# Patient Record
Sex: FEMALE | Race: BLACK OR AFRICAN AMERICAN | NOT HISPANIC OR LATINO | Employment: FULL TIME | URBAN - METROPOLITAN AREA
[De-identification: names, ages, dates, MRNs, and addresses within clinical notes are randomized per-mention and may not be internally consistent; named-entity substitution may affect disease eponyms.]

---

## 2021-07-01 ENCOUNTER — PATIENT MESSAGE (OUTPATIENT)
Dept: ADMINISTRATIVE | Facility: OTHER | Age: 46
End: 2021-07-01

## 2022-06-01 ENCOUNTER — LAB VISIT (OUTPATIENT)
Dept: LAB | Facility: HOSPITAL | Age: 47
End: 2022-06-01
Attending: OBSTETRICS & GYNECOLOGY
Payer: COMMERCIAL

## 2022-06-01 ENCOUNTER — OFFICE VISIT (OUTPATIENT)
Dept: OBSTETRICS AND GYNECOLOGY | Facility: CLINIC | Age: 47
End: 2022-06-01
Payer: COMMERCIAL

## 2022-06-01 VITALS
BODY MASS INDEX: 36.74 KG/M2 | DIASTOLIC BLOOD PRESSURE: 78 MMHG | SYSTOLIC BLOOD PRESSURE: 118 MMHG | WEIGHT: 215.19 LBS | HEIGHT: 64 IN

## 2022-06-01 DIAGNOSIS — Z12.4 PAP SMEAR FOR CERVICAL CANCER SCREENING: ICD-10-CM

## 2022-06-01 DIAGNOSIS — N93.8 DUB (DYSFUNCTIONAL UTERINE BLEEDING): Primary | ICD-10-CM

## 2022-06-01 DIAGNOSIS — Z12.31 ENCOUNTER FOR SCREENING MAMMOGRAM FOR MALIGNANT NEOPLASM OF BREAST: ICD-10-CM

## 2022-06-01 DIAGNOSIS — N93.8 DUB (DYSFUNCTIONAL UTERINE BLEEDING): ICD-10-CM

## 2022-06-01 LAB
BASOPHILS # BLD AUTO: 0.05 K/UL (ref 0–0.2)
BASOPHILS NFR BLD: 0.7 % (ref 0–1.9)
DIFFERENTIAL METHOD: ABNORMAL
EOSINOPHIL # BLD AUTO: 0.2 K/UL (ref 0–0.5)
EOSINOPHIL NFR BLD: 2.3 % (ref 0–8)
ERYTHROCYTE [DISTWIDTH] IN BLOOD BY AUTOMATED COUNT: 14.8 % (ref 11.5–14.5)
HCT VFR BLD AUTO: 35.4 % (ref 37–48.5)
HGB BLD-MCNC: 10.6 G/DL (ref 12–16)
IMM GRANULOCYTES # BLD AUTO: 0.01 K/UL (ref 0–0.04)
IMM GRANULOCYTES NFR BLD AUTO: 0.1 % (ref 0–0.5)
LYMPHOCYTES # BLD AUTO: 2.2 K/UL (ref 1–4.8)
LYMPHOCYTES NFR BLD: 31.6 % (ref 18–48)
MCH RBC QN AUTO: 21.5 PG (ref 27–31)
MCHC RBC AUTO-ENTMCNC: 29.9 G/DL (ref 32–36)
MCV RBC AUTO: 72 FL (ref 82–98)
MONOCYTES # BLD AUTO: 0.5 K/UL (ref 0.3–1)
MONOCYTES NFR BLD: 7.1 % (ref 4–15)
NEUTROPHILS # BLD AUTO: 4 K/UL (ref 1.8–7.7)
NEUTROPHILS NFR BLD: 58.2 % (ref 38–73)
NRBC BLD-RTO: 0 /100 WBC
PLATELET # BLD AUTO: 449 K/UL (ref 150–450)
PMV BLD AUTO: 9.5 FL (ref 9.2–12.9)
RBC # BLD AUTO: 4.94 M/UL (ref 4–5.4)
WBC # BLD AUTO: 6.9 K/UL (ref 3.9–12.7)

## 2022-06-01 PROCEDURE — 85025 COMPLETE CBC W/AUTO DIFF WBC: CPT | Performed by: OBSTETRICS & GYNECOLOGY

## 2022-06-01 PROCEDURE — 1159F PR MEDICATION LIST DOCUMENTED IN MEDICAL RECORD: ICD-10-PCS | Mod: CPTII,S$GLB,, | Performed by: OBSTETRICS & GYNECOLOGY

## 2022-06-01 PROCEDURE — 99204 PR OFFICE/OUTPT VISIT, NEW, LEVL IV, 45-59 MIN: ICD-10-PCS | Mod: 25,S$GLB,, | Performed by: OBSTETRICS & GYNECOLOGY

## 2022-06-01 PROCEDURE — 3074F PR MOST RECENT SYSTOLIC BLOOD PRESSURE < 130 MM HG: ICD-10-PCS | Mod: CPTII,S$GLB,, | Performed by: OBSTETRICS & GYNECOLOGY

## 2022-06-01 PROCEDURE — 1160F RVW MEDS BY RX/DR IN RCRD: CPT | Mod: CPTII,S$GLB,, | Performed by: OBSTETRICS & GYNECOLOGY

## 2022-06-01 PROCEDURE — 3008F PR BODY MASS INDEX (BMI) DOCUMENTED: ICD-10-PCS | Mod: CPTII,S$GLB,, | Performed by: OBSTETRICS & GYNECOLOGY

## 2022-06-01 PROCEDURE — 88305 TISSUE EXAM BY PATHOLOGIST: CPT | Performed by: STUDENT IN AN ORGANIZED HEALTH CARE EDUCATION/TRAINING PROGRAM

## 2022-06-01 PROCEDURE — 87624 HPV HI-RISK TYP POOLED RSLT: CPT | Performed by: OBSTETRICS & GYNECOLOGY

## 2022-06-01 PROCEDURE — 36415 COLL VENOUS BLD VENIPUNCTURE: CPT | Performed by: OBSTETRICS & GYNECOLOGY

## 2022-06-01 PROCEDURE — 1160F PR REVIEW ALL MEDS BY PRESCRIBER/CLIN PHARMACIST DOCUMENTED: ICD-10-PCS | Mod: CPTII,S$GLB,, | Performed by: OBSTETRICS & GYNECOLOGY

## 2022-06-01 PROCEDURE — 3078F DIAST BP <80 MM HG: CPT | Mod: CPTII,S$GLB,, | Performed by: OBSTETRICS & GYNECOLOGY

## 2022-06-01 PROCEDURE — 99999 PR PBB SHADOW E&M-EST. PATIENT-LVL III: CPT | Mod: PBBFAC,,, | Performed by: OBSTETRICS & GYNECOLOGY

## 2022-06-01 PROCEDURE — 88305 TISSUE EXAM BY PATHOLOGIST: ICD-10-PCS | Mod: 26,,, | Performed by: STUDENT IN AN ORGANIZED HEALTH CARE EDUCATION/TRAINING PROGRAM

## 2022-06-01 PROCEDURE — 88175 CYTOPATH C/V AUTO FLUID REDO: CPT | Performed by: OBSTETRICS & GYNECOLOGY

## 2022-06-01 PROCEDURE — 58100 PR BIOPSY OF UTERUS LINING: ICD-10-PCS | Mod: S$GLB,,, | Performed by: OBSTETRICS & GYNECOLOGY

## 2022-06-01 PROCEDURE — 88305 TISSUE EXAM BY PATHOLOGIST: CPT | Mod: 26,,, | Performed by: STUDENT IN AN ORGANIZED HEALTH CARE EDUCATION/TRAINING PROGRAM

## 2022-06-01 PROCEDURE — 3074F SYST BP LT 130 MM HG: CPT | Mod: CPTII,S$GLB,, | Performed by: OBSTETRICS & GYNECOLOGY

## 2022-06-01 PROCEDURE — 99999 PR PBB SHADOW E&M-EST. PATIENT-LVL III: ICD-10-PCS | Mod: PBBFAC,,, | Performed by: OBSTETRICS & GYNECOLOGY

## 2022-06-01 PROCEDURE — 3078F PR MOST RECENT DIASTOLIC BLOOD PRESSURE < 80 MM HG: ICD-10-PCS | Mod: CPTII,S$GLB,, | Performed by: OBSTETRICS & GYNECOLOGY

## 2022-06-01 PROCEDURE — 99204 OFFICE O/P NEW MOD 45 MIN: CPT | Mod: 25,S$GLB,, | Performed by: OBSTETRICS & GYNECOLOGY

## 2022-06-01 PROCEDURE — 1159F MED LIST DOCD IN RCRD: CPT | Mod: CPTII,S$GLB,, | Performed by: OBSTETRICS & GYNECOLOGY

## 2022-06-01 PROCEDURE — 58100 BIOPSY OF UTERUS LINING: CPT | Mod: S$GLB,,, | Performed by: OBSTETRICS & GYNECOLOGY

## 2022-06-01 PROCEDURE — 3008F BODY MASS INDEX DOCD: CPT | Mod: CPTII,S$GLB,, | Performed by: OBSTETRICS & GYNECOLOGY

## 2022-06-01 RX ORDER — PHENTERMINE HYDROCHLORIDE 37.5 MG/1
37.5 TABLET ORAL DAILY
COMMUNITY
Start: 2022-03-11 | End: 2023-08-02

## 2022-06-01 RX ORDER — CLOBETASOL PROPIONATE 0.46 MG/ML
SOLUTION TOPICAL 2 TIMES DAILY
COMMUNITY
Start: 2022-05-14 | End: 2024-01-03

## 2022-06-01 RX ORDER — NORETHINDRONE 5 MG/1
5 TABLET ORAL DAILY
Qty: 12 TABLET | Refills: 0 | Status: SHIPPED | OUTPATIENT
Start: 2022-06-01 | End: 2023-02-08

## 2022-06-01 NOTE — PROGRESS NOTES
"  Subjective:       Patient ID: Harrison Estevez is a 46 y.o. female.    Chief Complaint:  abnormal uterine bleeding  (Patient in due to abnormal uterine bleeding which started in 2018 after she had a procedure performed due to having fibroids. Patient states she wants to see what her options are and to see if the fibroids have grown. )      History of Present Illness  HPI  Returns to gyn practice after several years   In 2018, seeing NP in [facility]. For screening exams and to manage abnormal uterine bleeding that was not responding to progestin therapy   According to the patient, the NP performed hysteroscopy under anesthesia at [facility] with no MD supervision.  The procedure was not successful and she was informed that there were myomas causing the bleeding   She continued the Provera until the bleeding was heavy,then stopped.  No menses for several months was then followed with heavy prolonged flow with no pain   Pap is due, mammogram is due   Recommend Embx today with exam     Health Maintenance   Topic Date Due    Lipid Panel  Never done    TETANUS VACCINE  Never done    Mammogram  Never done    Hepatitis C Screening  Completed     GYN & OB History  Patient's last menstrual period was 2022.   Date of Last Pap: No result found    OB History    Para Term  AB Living   3 3 3     3   SAB IAB Ectopic Multiple Live Births           3      # Outcome Date GA Lbr Indra/2nd Weight Sex Delivery Anes PTL Lv   3 Term 08    F CS-LTranv   SARAY   2 Term 89    F Vag-Spont   SARAY   1 Term 89    M Vag-Spont   SARAY       Review of Systems  Review of Systems        Objective:   /78   Ht 5' 4" (1.626 m)   Wt 97.6 kg (215 lb 2.7 oz)   LMP 2022   BMI 36.93 kg/m²    Physical Exam:   Constitutional: She appears well-developed and well-nourished. No distress.      Neck: No JVD present. No thyroid mass and no thyromegaly present.    Cardiovascular: Normal rate and regular " rhythm.          Abdominal: Soft. Bowel sounds are normal. No hernia. Hernia confirmed negative in the ventral area, confirmed negative in the right inguinal area and confirmed negative in the left inguinal area.     Genitourinary:    Vagina, uterus and rectum normal.   Labial bartholins normal.There is no rash, tenderness, lesion or injury on the right labia. There is no rash, tenderness, lesion or injury on the left labia. Cervix is normal. Right adnexum displays no mass, no tenderness and no fullness. Left adnexum displays no mass, no tenderness and no fullness. No erythema,  no vaginal discharge, tenderness or bleeding in the vagina.    No foreign body in the vagina.   Cervix exhibits no motion tenderness, no discharge and no friability.    pap smear completedUterus is not deviated, not enlarged, not fixed and not tender.                     Endometrial biopsy    Verbal consent for biopsy done, explained risk of uterine perforation and level of discomfort from the procedure. No alternative available.     Cervix visualized,   Anterior cervical lip grasped with tenaculum   Pipelle passed to 6 cm without resistance   Adequate tissue removed and sent to pathology for evaluation  Instruments remove  Less than 1 cc blood loss     Patient tolerated the procedure well.        Assessment:        1. DUB (dysfunctional uterine bleeding)    2. Pap smear for cervical cancer screening    3. Encounter for screening mammogram for malignant neoplasm of breast                Plan:            Harrison was seen today for abnormal uterine bleeding .    Diagnoses and all orders for this visit:    DUB (dysfunctional uterine bleeding)  -     CBC Auto Differential; Future  -     norethindrone (AYGESTIN) 5 mg Tab; Take 1 tablet (5 mg total) by mouth once daily. for 12 days  -     Specimen to Pathology, Ob/Gyn  -     Endometrial biopsy; Future  -     US Pelvis Complete Non OB; Future    Pap smear for cervical cancer screening  -      Liquid-Based Pap Smear, Screening  -     HPV High Risk Genotypes, PCR    Encounter for screening mammogram for malignant neoplasm of breast  -     Mammo Digital Screening Bilat w/ William; Future      Start of cyclic progestin today.   Follow up after biopsy and ultrasound results   Decision for surgical intervention will be based on ultrasound results and success of progestin   Reviewed hysteroscopy and hysterectomy   Note no desire for fertility

## 2022-06-02 ENCOUNTER — TELEPHONE (OUTPATIENT)
Dept: OBSTETRICS AND GYNECOLOGY | Facility: CLINIC | Age: 47
End: 2022-06-02
Payer: COMMERCIAL

## 2022-06-02 ENCOUNTER — HOSPITAL ENCOUNTER (OUTPATIENT)
Dept: RADIOLOGY | Facility: HOSPITAL | Age: 47
Discharge: HOME | End: 2022-06-02
Attending: OBSTETRICS & GYNECOLOGY
Payer: COMMERCIAL

## 2022-06-02 DIAGNOSIS — N93.8 DUB (DYSFUNCTIONAL UTERINE BLEEDING): ICD-10-CM

## 2022-06-02 PROCEDURE — 76856 US EXAM PELVIC COMPLETE: CPT | Mod: TC

## 2022-06-07 LAB
FINAL PATHOLOGIC DIAGNOSIS: NORMAL
HPV HR 12 DNA SPEC QL NAA+PROBE: NEGATIVE
HPV16 AG SPEC QL: NEGATIVE
HPV18 DNA SPEC QL NAA+PROBE: NEGATIVE
Lab: NORMAL

## 2022-06-09 LAB
FINAL PATHOLOGIC DIAGNOSIS: NORMAL
Lab: NORMAL

## 2022-06-10 ENCOUNTER — PATIENT MESSAGE (OUTPATIENT)
Dept: OBSTETRICS AND GYNECOLOGY | Facility: CLINIC | Age: 47
End: 2022-06-10
Payer: COMMERCIAL

## 2022-07-05 ENCOUNTER — TELEPHONE (OUTPATIENT)
Dept: OBSTETRICS AND GYNECOLOGY | Facility: CLINIC | Age: 47
End: 2022-07-05
Payer: COMMERCIAL

## 2023-02-08 ENCOUNTER — OFFICE VISIT (OUTPATIENT)
Dept: OBSTETRICS AND GYNECOLOGY | Facility: CLINIC | Age: 48
End: 2023-02-08
Payer: COMMERCIAL

## 2023-02-08 VITALS
DIASTOLIC BLOOD PRESSURE: 80 MMHG | SYSTOLIC BLOOD PRESSURE: 130 MMHG | WEIGHT: 223.56 LBS | BODY MASS INDEX: 38.17 KG/M2 | HEIGHT: 64 IN

## 2023-02-08 DIAGNOSIS — D25.1 LEIOMYOMA, INTRAMURAL: Primary | ICD-10-CM

## 2023-02-08 DIAGNOSIS — N93.8 DUB (DYSFUNCTIONAL UTERINE BLEEDING): ICD-10-CM

## 2023-02-08 PROCEDURE — 3075F PR MOST RECENT SYSTOLIC BLOOD PRESS GE 130-139MM HG: ICD-10-PCS | Mod: CPTII,S$GLB,, | Performed by: OBSTETRICS & GYNECOLOGY

## 2023-02-08 PROCEDURE — 3079F DIAST BP 80-89 MM HG: CPT | Mod: CPTII,S$GLB,, | Performed by: OBSTETRICS & GYNECOLOGY

## 2023-02-08 PROCEDURE — 1159F PR MEDICATION LIST DOCUMENTED IN MEDICAL RECORD: ICD-10-PCS | Mod: CPTII,S$GLB,, | Performed by: OBSTETRICS & GYNECOLOGY

## 2023-02-08 PROCEDURE — 99213 OFFICE O/P EST LOW 20 MIN: CPT | Mod: S$GLB,,, | Performed by: OBSTETRICS & GYNECOLOGY

## 2023-02-08 PROCEDURE — 3008F PR BODY MASS INDEX (BMI) DOCUMENTED: ICD-10-PCS | Mod: CPTII,S$GLB,, | Performed by: OBSTETRICS & GYNECOLOGY

## 2023-02-08 PROCEDURE — 99999 PR PBB SHADOW E&M-EST. PATIENT-LVL III: CPT | Mod: PBBFAC,,, | Performed by: OBSTETRICS & GYNECOLOGY

## 2023-02-08 PROCEDURE — 1160F RVW MEDS BY RX/DR IN RCRD: CPT | Mod: CPTII,S$GLB,, | Performed by: OBSTETRICS & GYNECOLOGY

## 2023-02-08 PROCEDURE — 1160F PR REVIEW ALL MEDS BY PRESCRIBER/CLIN PHARMACIST DOCUMENTED: ICD-10-PCS | Mod: CPTII,S$GLB,, | Performed by: OBSTETRICS & GYNECOLOGY

## 2023-02-08 PROCEDURE — 99213 PR OFFICE/OUTPT VISIT, EST, LEVL III, 20-29 MIN: ICD-10-PCS | Mod: S$GLB,,, | Performed by: OBSTETRICS & GYNECOLOGY

## 2023-02-08 PROCEDURE — 99999 PR PBB SHADOW E&M-EST. PATIENT-LVL III: ICD-10-PCS | Mod: PBBFAC,,, | Performed by: OBSTETRICS & GYNECOLOGY

## 2023-02-08 PROCEDURE — 3075F SYST BP GE 130 - 139MM HG: CPT | Mod: CPTII,S$GLB,, | Performed by: OBSTETRICS & GYNECOLOGY

## 2023-02-08 PROCEDURE — 1159F MED LIST DOCD IN RCRD: CPT | Mod: CPTII,S$GLB,, | Performed by: OBSTETRICS & GYNECOLOGY

## 2023-02-08 PROCEDURE — 3008F BODY MASS INDEX DOCD: CPT | Mod: CPTII,S$GLB,, | Performed by: OBSTETRICS & GYNECOLOGY

## 2023-02-08 PROCEDURE — 3079F PR MOST RECENT DIASTOLIC BLOOD PRESSURE 80-89 MM HG: ICD-10-PCS | Mod: CPTII,S$GLB,, | Performed by: OBSTETRICS & GYNECOLOGY

## 2023-02-08 NOTE — PROGRESS NOTES
"  Subjective:       Patient ID: Harrison Estevez is a 47 y.o. female.    Chief Complaint:  Consult (Hyst)      History of Present Illness  HPI  History of heavy menses with negative embx   Myomas 10 week size by ultrasound   Weight gain on progestin therapy, has stopped taking   Hysteroscopy at [facility] in the past not successful   Ready to discuss definitive therapy   Reviewed findings and medical chart   Recommend Robotic Hysterectomy salpingectomy with ovarian conservation per patient request     I spent a total of 20 minutes on the day of the visit.  This includes face to face time and non-face to face time preparing to see the patient (eg, review of tests), obtaining and/or reviewing separately obtained history, documenting clinical information in the electronic or other health record, independently interpreting results and communicating results to the patient/family/caregiver, or care coordinator.   Health Maintenance   Topic Date Due    Lipid Panel  Never done    TETANUS VACCINE  Never done    Mammogram  Never done    Hepatitis C Screening  Completed     GYN & OB History  Patient's last menstrual period was 2023.   Date of Last Pap: 2022    OB History    Para Term  AB Living   3 3 3     3   SAB IAB Ectopic Multiple Live Births           3      # Outcome Date GA Lbr Indra/2nd Weight Sex Delivery Anes PTL Lv   3 Term 08    F CS-LTranv   SARAY   2 Term 89    F Vag-Spont   SARAY   1 Term 89    M Vag-Spont   SARAY       Review of Systems  Review of Systems        Objective:   /80   Ht 5' 4" (1.626 m)   Wt 101.4 kg (223 lb 8.7 oz)   LMP 2023   BMI 38.37 kg/m²    Physical Exam     Assessment:        1. Leiomyoma, intramural    2. DUB (dysfunctional uterine bleeding)                Plan:            Harrison was seen today for consult.    Diagnoses and all orders for this visit:    Leiomyoma, intramural  Comments:  robotic Hysterectomy salpingectomyy     DUB " (dysfunctional uterine bleeding)

## 2023-02-08 NOTE — LETTER
May 24, 2023      [facility]  [address] [place] [address]  Phone: [phone]  Fax: [phone]       Patient: Harrison Estevez   YOB: 1975  Date of Visit: 05/24/2023    To Whom It May Concern:    Harrison Estevez is under my care and is scheduled for surgery on 6/30/23.   She will require 4-6 weeks of recovery following her surgery.  If you have any questions or concerns, or if I can be of further assistance, please do not hesitate to contact me.    Sincerely,    BHUMI Shine MD

## 2023-02-09 DIAGNOSIS — D25.1 LEIOMYOMA, INTRAMURAL: Primary | ICD-10-CM

## 2023-02-09 DIAGNOSIS — N93.8 DUB (DYSFUNCTIONAL UTERINE BLEEDING): ICD-10-CM

## 2023-03-24 ENCOUNTER — HOSPITAL ENCOUNTER (OUTPATIENT)
Dept: RADIOLOGY | Facility: HOSPITAL | Age: 48
Discharge: HOME | End: 2023-03-24
Attending: OBSTETRICS & GYNECOLOGY
Payer: COMMERCIAL

## 2023-03-24 DIAGNOSIS — Z12.31 ENCOUNTER FOR SCREENING MAMMOGRAM FOR MALIGNANT NEOPLASM OF BREAST: ICD-10-CM

## 2023-03-24 PROCEDURE — 77063 BREAST TOMOSYNTHESIS BI: CPT | Mod: 26,,, | Performed by: RADIOLOGY

## 2023-03-24 PROCEDURE — 77067 SCR MAMMO BI INCL CAD: CPT | Mod: 26,,, | Performed by: RADIOLOGY

## 2023-03-24 PROCEDURE — 77067 MAMMO DIGITAL SCREENING BILAT WITH TOMO: ICD-10-PCS | Mod: 26,,, | Performed by: RADIOLOGY

## 2023-03-24 PROCEDURE — 77067 SCR MAMMO BI INCL CAD: CPT | Mod: TC

## 2023-03-24 PROCEDURE — 77063 MAMMO DIGITAL SCREENING BILAT WITH TOMO: ICD-10-PCS | Mod: 26,,, | Performed by: RADIOLOGY

## 2023-03-31 ENCOUNTER — TELEPHONE (OUTPATIENT)
Dept: OBSTETRICS AND GYNECOLOGY | Facility: CLINIC | Age: 48
End: 2023-03-31
Payer: COMMERCIAL

## 2023-03-31 NOTE — TELEPHONE ENCOUNTER
----- Message from Caty Newell LPN sent at 3/31/2023  3:29 PM CDT -----  Regarding: Reschedule  Good afternoon!   I just called this patient to complete her PAT call.   She is wanting to reschedule her procedure on 4/21/2023 due to the out of pocket cost.   She would like it to be reschedule to some day in June.   May you please reach out to her ?   Thank you so much!

## 2023-04-03 ENCOUNTER — PATIENT MESSAGE (OUTPATIENT)
Dept: OBSTETRICS AND GYNECOLOGY | Facility: CLINIC | Age: 48
End: 2023-04-03
Payer: COMMERCIAL

## 2023-05-08 ENCOUNTER — PATIENT MESSAGE (OUTPATIENT)
Dept: OBSTETRICS AND GYNECOLOGY | Facility: CLINIC | Age: 48
End: 2023-05-08
Payer: COMMERCIAL

## 2023-05-23 ENCOUNTER — PATIENT MESSAGE (OUTPATIENT)
Dept: OBSTETRICS AND GYNECOLOGY | Facility: CLINIC | Age: 48
End: 2023-05-23
Payer: COMMERCIAL

## 2023-06-12 ENCOUNTER — OFFICE VISIT (OUTPATIENT)
Dept: OBSTETRICS AND GYNECOLOGY | Facility: CLINIC | Age: 48
End: 2023-06-12
Payer: COMMERCIAL

## 2023-06-12 ENCOUNTER — LAB VISIT (OUTPATIENT)
Dept: LAB | Facility: HOSPITAL | Age: 48
End: 2023-06-12
Attending: PHYSICIAN ASSISTANT
Payer: COMMERCIAL

## 2023-06-12 VITALS
HEIGHT: 64 IN | DIASTOLIC BLOOD PRESSURE: 78 MMHG | SYSTOLIC BLOOD PRESSURE: 114 MMHG | BODY MASS INDEX: 38.12 KG/M2 | WEIGHT: 223.31 LBS

## 2023-06-12 DIAGNOSIS — D25.1 LEIOMYOMA, INTRAMURAL: Primary | ICD-10-CM

## 2023-06-12 DIAGNOSIS — D25.1 LEIOMYOMA, INTRAMURAL: ICD-10-CM

## 2023-06-12 DIAGNOSIS — N93.8 DUB (DYSFUNCTIONAL UTERINE BLEEDING): ICD-10-CM

## 2023-06-12 LAB
ANION GAP SERPL CALC-SCNC: 9 MMOL/L (ref 8–16)
BASOPHILS # BLD AUTO: 0.04 K/UL (ref 0–0.2)
BASOPHILS NFR BLD: 0.8 % (ref 0–1.9)
BUN SERPL-MCNC: 13 MG/DL (ref 6–20)
CALCIUM SERPL-MCNC: 9.3 MG/DL (ref 8.7–10.5)
CHLORIDE SERPL-SCNC: 104 MMOL/L (ref 95–110)
CO2 SERPL-SCNC: 28 MMOL/L (ref 23–29)
CREAT SERPL-MCNC: 0.8 MG/DL (ref 0.5–1.4)
DIFFERENTIAL METHOD: ABNORMAL
EOSINOPHIL # BLD AUTO: 0.2 K/UL (ref 0–0.5)
EOSINOPHIL NFR BLD: 3.2 % (ref 0–8)
ERYTHROCYTE [DISTWIDTH] IN BLOOD BY AUTOMATED COUNT: 14.1 % (ref 11.5–14.5)
EST. GFR  (NO RACE VARIABLE): >60 ML/MIN/1.73 M^2
GLUCOSE SERPL-MCNC: 74 MG/DL (ref 70–110)
HCT VFR BLD AUTO: 37.1 % (ref 37–48.5)
HGB BLD-MCNC: 11.2 G/DL (ref 12–16)
IMM GRANULOCYTES # BLD AUTO: 0 K/UL (ref 0–0.04)
IMM GRANULOCYTES NFR BLD AUTO: 0 % (ref 0–0.5)
LYMPHOCYTES # BLD AUTO: 1.8 K/UL (ref 1–4.8)
LYMPHOCYTES NFR BLD: 36.7 % (ref 18–48)
MCH RBC QN AUTO: 20.9 PG (ref 27–31)
MCHC RBC AUTO-ENTMCNC: 30.2 G/DL (ref 32–36)
MCV RBC AUTO: 69 FL (ref 82–98)
MONOCYTES # BLD AUTO: 0.4 K/UL (ref 0.3–1)
MONOCYTES NFR BLD: 7.9 % (ref 4–15)
NEUTROPHILS # BLD AUTO: 2.5 K/UL (ref 1.8–7.7)
NEUTROPHILS NFR BLD: 51.4 % (ref 38–73)
NRBC BLD-RTO: 0 /100 WBC
PLATELET # BLD AUTO: 353 K/UL (ref 150–450)
PMV BLD AUTO: 10.2 FL (ref 9.2–12.9)
POTASSIUM SERPL-SCNC: 4.3 MMOL/L (ref 3.5–5.1)
RBC # BLD AUTO: 5.35 M/UL (ref 4–5.4)
SODIUM SERPL-SCNC: 141 MMOL/L (ref 136–145)
WBC # BLD AUTO: 4.93 K/UL (ref 3.9–12.7)

## 2023-06-12 PROCEDURE — 85025 COMPLETE CBC W/AUTO DIFF WBC: CPT | Performed by: PHYSICIAN ASSISTANT

## 2023-06-12 PROCEDURE — 36415 COLL VENOUS BLD VENIPUNCTURE: CPT | Performed by: PHYSICIAN ASSISTANT

## 2023-06-12 PROCEDURE — 99999 PR PBB SHADOW E&M-EST. PATIENT-LVL III: CPT | Mod: PBBFAC,,, | Performed by: OBSTETRICS & GYNECOLOGY

## 2023-06-12 PROCEDURE — 99999 PR PBB SHADOW E&M-EST. PATIENT-LVL III: ICD-10-PCS | Mod: PBBFAC,,, | Performed by: OBSTETRICS & GYNECOLOGY

## 2023-06-12 PROCEDURE — 80048 BASIC METABOLIC PNL TOTAL CA: CPT | Performed by: PHYSICIAN ASSISTANT

## 2023-06-12 PROCEDURE — 99499 NO LOS: ICD-10-PCS | Mod: S$GLB,,, | Performed by: OBSTETRICS & GYNECOLOGY

## 2023-06-12 PROCEDURE — 99499 UNLISTED E&M SERVICE: CPT | Mod: S$GLB,,, | Performed by: OBSTETRICS & GYNECOLOGY

## 2023-06-12 RX ORDER — HYDROMORPHONE HYDROCHLORIDE 2 MG/ML
1 INJECTION, SOLUTION INTRAMUSCULAR; INTRAVENOUS; SUBCUTANEOUS EVERY 4 HOURS PRN
Status: CANCELLED | OUTPATIENT
Start: 2023-06-12

## 2023-06-12 RX ORDER — HYDROMORPHONE HYDROCHLORIDE 2 MG/ML
0.2 INJECTION, SOLUTION INTRAMUSCULAR; INTRAVENOUS; SUBCUTANEOUS EVERY 5 MIN PRN
Status: CANCELLED | OUTPATIENT
Start: 2023-06-12

## 2023-06-12 RX ORDER — DIPHENHYDRAMINE HCL 25 MG
25 CAPSULE ORAL EVERY 4 HOURS PRN
Status: CANCELLED | OUTPATIENT
Start: 2023-06-12

## 2023-06-12 RX ORDER — FAMOTIDINE 20 MG/1
20 TABLET, FILM COATED ORAL
Status: CANCELLED | OUTPATIENT
Start: 2023-06-12

## 2023-06-12 RX ORDER — ONDANSETRON 4 MG/1
8 TABLET, ORALLY DISINTEGRATING ORAL EVERY 8 HOURS PRN
Status: CANCELLED | OUTPATIENT
Start: 2023-06-12

## 2023-06-12 RX ORDER — MUPIROCIN 20 MG/G
OINTMENT TOPICAL
Status: CANCELLED | OUTPATIENT
Start: 2023-06-12

## 2023-06-12 RX ORDER — ACETAMINOPHEN 500 MG
1000 TABLET ORAL
Status: CANCELLED | OUTPATIENT
Start: 2023-06-12

## 2023-06-12 RX ORDER — PROCHLORPERAZINE EDISYLATE 5 MG/ML
5 INJECTION INTRAMUSCULAR; INTRAVENOUS EVERY 6 HOURS PRN
Status: CANCELLED | OUTPATIENT
Start: 2023-06-12

## 2023-06-12 RX ORDER — SODIUM CHLORIDE, SODIUM LACTATE, POTASSIUM CHLORIDE, CALCIUM CHLORIDE 600; 310; 30; 20 MG/100ML; MG/100ML; MG/100ML; MG/100ML
INJECTION, SOLUTION INTRAVENOUS CONTINUOUS
Status: CANCELLED | OUTPATIENT
Start: 2023-06-12

## 2023-06-12 RX ORDER — HYDROCODONE BITARTRATE AND ACETAMINOPHEN 10; 325 MG/1; MG/1
1 TABLET ORAL EVERY 4 HOURS PRN
Status: CANCELLED | OUTPATIENT
Start: 2023-06-12

## 2023-06-12 RX ORDER — POLYETHYLENE GLYCOL 3350 17 G/17G
17 POWDER, FOR SOLUTION ORAL DAILY
Status: CANCELLED | OUTPATIENT
Start: 2023-06-12

## 2023-06-12 RX ORDER — MEPERIDINE HYDROCHLORIDE 100 MG/ML
12.5 INJECTION INTRAMUSCULAR; INTRAVENOUS; SUBCUTANEOUS ONCE AS NEEDED
Status: CANCELLED | OUTPATIENT
Start: 2023-06-12 | End: 2023-06-13

## 2023-06-12 RX ORDER — HYDROCODONE BITARTRATE AND ACETAMINOPHEN 5; 325 MG/1; MG/1
1 TABLET ORAL EVERY 4 HOURS PRN
Status: CANCELLED | OUTPATIENT
Start: 2023-06-12

## 2023-06-12 RX ORDER — LIDOCAINE HYDROCHLORIDE 10 MG/ML
0.5 INJECTION, SOLUTION EPIDURAL; INFILTRATION; INTRACAUDAL; PERINEURAL
Status: CANCELLED | OUTPATIENT
Start: 2023-06-12

## 2023-06-12 RX ORDER — IBUPROFEN 200 MG
800 TABLET ORAL
Status: CANCELLED | OUTPATIENT
Start: 2023-06-13

## 2023-06-12 RX ORDER — CELECOXIB 100 MG/1
400 CAPSULE ORAL
Status: CANCELLED | OUTPATIENT
Start: 2023-06-12

## 2023-06-12 RX ORDER — SODIUM CHLORIDE 0.9 % (FLUSH) 0.9 %
3 SYRINGE (ML) INJECTION
Status: CANCELLED | OUTPATIENT
Start: 2023-06-12

## 2023-06-12 RX ORDER — KETOROLAC TROMETHAMINE 30 MG/ML
30 INJECTION, SOLUTION INTRAMUSCULAR; INTRAVENOUS
Status: CANCELLED | OUTPATIENT
Start: 2023-06-12 | End: 2023-06-13

## 2023-06-12 RX ORDER — PROCHLORPERAZINE EDISYLATE 5 MG/ML
5 INJECTION INTRAMUSCULAR; INTRAVENOUS EVERY 10 MIN PRN
Status: CANCELLED | OUTPATIENT
Start: 2023-06-12

## 2023-06-12 RX ORDER — DIPHENHYDRAMINE HYDROCHLORIDE 50 MG/ML
25 INJECTION INTRAMUSCULAR; INTRAVENOUS EVERY 4 HOURS PRN
Status: CANCELLED | OUTPATIENT
Start: 2023-06-12

## 2023-06-12 RX ORDER — AMOXICILLIN 250 MG
1 CAPSULE ORAL 2 TIMES DAILY
Status: CANCELLED | OUTPATIENT
Start: 2023-06-12

## 2023-06-12 RX ORDER — ONDANSETRON 2 MG/ML
4 INJECTION INTRAMUSCULAR; INTRAVENOUS DAILY PRN
Status: CANCELLED | OUTPATIENT
Start: 2023-06-12

## 2023-06-12 RX ORDER — ACETAMINOPHEN 325 MG/1
650 TABLET ORAL EVERY 4 HOURS PRN
Status: CANCELLED | OUTPATIENT
Start: 2023-06-12

## 2023-06-12 NOTE — H&P (VIEW-ONLY)
Subjective:      Patient ID: Harrison Estevez is a 47 y.o. female.    Chief Complaint:  Pre-op Exam      History of Present Illness  HPI  History of heavy menses with negative embx   Myomas 10 week size by ultrasound   Weight gain on progestin therapy, has stopped taking   Hysteroscopy at [facility] in the past not successful   Ready to discuss definitive therapy   Reviewed findings and medical chart   Recommend Robotic Hysterectomy salpingectomy with ovarian conservation per patient request     GYN & OB History  Patient's last menstrual period was 2023.   Date of Last Pap: 2022    OB History    Para Term  AB Living   3 3 3     3   SAB IAB Ectopic Multiple Live Births           3      # Outcome Date GA Lbr Indra/2nd Weight Sex Delivery Anes PTL Lv   3 Term 08    F CS-LTranv   SARAY   2 Term 89    F Vag-Spont   SARAY   1 Term 89    M Vag-Spont   SARAY     Past Medical History:   Diagnosis Date    Abnormal Pap smear 2011    Cryosurgery done     Past Surgical History:   Procedure Laterality Date    ANKLE SURGERY      APPENDECTOMY      GYNECOLOGIC CRYOSURGERY  2011     Family History   Problem Relation Age of Onset    Breast cancer Maternal Grandmother     Breast cancer Maternal Aunt      Social History     Tobacco Use    Smoking status: Never    Smokeless tobacco: Never   Substance Use Topics    Alcohol use: Yes     Comment: occ    Drug use: No     (Not in a hospital admission)    Review of patient's allergies indicates:   Allergen Reactions    Penicillanic sulfone bl beta-lactamase inhibitors Hives     Current Outpatient Medications   Medication Sig    clobetasoL (TEMOVATE) 0.05 % external solution Apply topically 2 (two) times daily.    phentermine (ADIPEX-P) 37.5 mg tablet Take 37.5 mg by mouth once daily.     No current facility-administered medications for this visit.      Review of Systems  Review of Systems   Constitutional:  Negative for appetite change, chills,  fatigue, fever and unexpected weight change.   HENT: Negative.     Eyes:  Negative for visual disturbance.   Respiratory:  Negative for shortness of breath and wheezing.    Cardiovascular:  Negative for chest pain, palpitations and leg swelling.   Gastrointestinal:  Negative for abdominal pain, bloating, blood in stool, constipation, diarrhea, nausea and vomiting.   Endocrine: Negative for hair loss, hot flashes and hypothyroidism.   Genitourinary:  Positive for menorrhagia. Negative for dysmenorrhea, dyspareunia, dysuria, flank pain, frequency, genital sores, hematuria, menstrual problem, pelvic pain, urgency, vaginal bleeding, vaginal discharge, urinary incontinence and vaginal odor.   Musculoskeletal:  Negative for back pain, joint swelling and myalgias.   Integumentary:  Negative for rash, hair changes, breast mass, nipple discharge and breast skin changes.   Neurological:  Negative for syncope and headaches.   Hematological:  Negative for adenopathy. Does not bruise/bleed easily.   Psychiatric/Behavioral:  Negative for depression and sleep disturbance. The patient is not nervous/anxious.    Breast: Negative for mass, mastodynia, nipple discharge and skin changes       Objective:     Physical Exam:   Constitutional: She is oriented to person, place, and time. Vital signs are normal. She appears well-developed and well-nourished. No distress.    HENT:   Head: Normocephalic and atraumatic.   Right Ear: External ear normal.   Left Ear: External ear normal.   Nose: Nose normal.    Eyes: Pupils are equal, round, and reactive to light. Conjunctivae are normal.    Neck: Trachea normal. No JVD present. No thyroid mass and no thyromegaly present.    Cardiovascular:  Normal rate and regular rhythm.             Pulmonary/Chest: Effort normal and breath sounds normal. No respiratory distress.        Abdominal: Soft. Bowel sounds are normal. She exhibits no distension and no mass. There is no abdominal tenderness. No hernia.  Hernia confirmed negative in the ventral area, confirmed negative in the right inguinal area and confirmed negative in the left inguinal area.     Genitourinary:    Urethra, vagina and rectum normal.   Rectum:      No external hemorrhoid or abnormal anal tone.   Labial bartholins normal.There is no rash, tenderness or lesion on the right labia. There is no rash, tenderness or lesion on the left labia. Cervix is normal. Right adnexum displays no mass, no tenderness and no fullness. Left adnexum displays no mass, no tenderness and no fullness. No  no vaginal discharge, tenderness, bleeding, rectocele, cystocele or unspecified prolapse of vaginal walls in the vagina.    No foreign body in the vagina.   Cervix exhibits no motion tenderness, no discharge and no friability. Uterus is enlarged, hosting fibroids and uterine contour abnormal . Uterus is not deviated and not tender. Uterus size: 12 cm.Urethral Meatus exhibits: urethral lesionUrethra findings: no urethral mass and no tendernessBladder findings: no bladder tenderness          Musculoskeletal: Normal range of motion.       Neurological: She is alert and oriented to person, place, and time. She has normal reflexes.    Skin: Skin is warm and dry. She is not diaphoretic.    Psychiatric: She has a normal mood and affect. Her speech is normal and behavior is normal. Thought content normal.       Assessment:     1. Leiomyoma, intramural               Plan:     Harrison was seen today for pre-op exam.    Diagnoses and all orders for this visit:    Leiomyoma, intramural  Comments:  Robotic Hysterectomy with bilateral salpingectomy

## 2023-06-12 NOTE — H&P
Subjective:      Patient ID: Harrison Estevez is a 47 y.o. female.    Chief Complaint:  Pre-op Exam      History of Present Illness  HPI  History of heavy menses with negative embx   Myomas 10 week size by ultrasound   Weight gain on progestin therapy, has stopped taking   Hysteroscopy at [facility] in the past not successful   Ready to discuss definitive therapy   Reviewed findings and medical chart   Recommend Robotic Hysterectomy salpingectomy with ovarian conservation per patient request     GYN & OB History  Patient's last menstrual period was 2023.   Date of Last Pap: 2022    OB History    Para Term  AB Living   3 3 3     3   SAB IAB Ectopic Multiple Live Births           3      # Outcome Date GA Lbr Indra/2nd Weight Sex Delivery Anes PTL Lv   3 Term 08    F CS-LTranv   SARAY   2 Term 89    F Vag-Spont   SARAY   1 Term 89    M Vag-Spont   SARAY     Past Medical History:   Diagnosis Date    Abnormal Pap smear 2011    Cryosurgery done     Past Surgical History:   Procedure Laterality Date    ANKLE SURGERY      APPENDECTOMY      GYNECOLOGIC CRYOSURGERY  2011     Family History   Problem Relation Age of Onset    Breast cancer Maternal Grandmother     Breast cancer Maternal Aunt      Social History     Tobacco Use    Smoking status: Never    Smokeless tobacco: Never   Substance Use Topics    Alcohol use: Yes     Comment: occ    Drug use: No     (Not in a hospital admission)    Review of patient's allergies indicates:   Allergen Reactions    Penicillanic sulfone bl beta-lactamase inhibitors Hives     Current Outpatient Medications   Medication Sig    clobetasoL (TEMOVATE) 0.05 % external solution Apply topically 2 (two) times daily.    phentermine (ADIPEX-P) 37.5 mg tablet Take 37.5 mg by mouth once daily.     No current facility-administered medications for this visit.      Review of Systems  Review of Systems   Constitutional:  Negative for appetite change, chills,  fatigue, fever and unexpected weight change.   HENT: Negative.     Eyes:  Negative for visual disturbance.   Respiratory:  Negative for shortness of breath and wheezing.    Cardiovascular:  Negative for chest pain, palpitations and leg swelling.   Gastrointestinal:  Negative for abdominal pain, bloating, blood in stool, constipation, diarrhea, nausea and vomiting.   Endocrine: Negative for hair loss, hot flashes and hypothyroidism.   Genitourinary:  Positive for menorrhagia. Negative for dysmenorrhea, dyspareunia, dysuria, flank pain, frequency, genital sores, hematuria, menstrual problem, pelvic pain, urgency, vaginal bleeding, vaginal discharge, urinary incontinence and vaginal odor.   Musculoskeletal:  Negative for back pain, joint swelling and myalgias.   Integumentary:  Negative for rash, hair changes, breast mass, nipple discharge and breast skin changes.   Neurological:  Negative for syncope and headaches.   Hematological:  Negative for adenopathy. Does not bruise/bleed easily.   Psychiatric/Behavioral:  Negative for depression and sleep disturbance. The patient is not nervous/anxious.    Breast: Negative for mass, mastodynia, nipple discharge and skin changes       Objective:     Physical Exam:   Constitutional: She is oriented to person, place, and time. Vital signs are normal. She appears well-developed and well-nourished. No distress.    HENT:   Head: Normocephalic and atraumatic.   Right Ear: External ear normal.   Left Ear: External ear normal.   Nose: Nose normal.    Eyes: Pupils are equal, round, and reactive to light. Conjunctivae are normal.    Neck: Trachea normal. No JVD present. No thyroid mass and no thyromegaly present.    Cardiovascular:  Normal rate and regular rhythm.             Pulmonary/Chest: Effort normal and breath sounds normal. No respiratory distress.        Abdominal: Soft. Bowel sounds are normal. She exhibits no distension and no mass. There is no abdominal tenderness. No hernia.  Hernia confirmed negative in the ventral area, confirmed negative in the right inguinal area and confirmed negative in the left inguinal area.     Genitourinary:    Urethra, vagina and rectum normal.   Rectum:      No external hemorrhoid or abnormal anal tone.   Labial bartholins normal.There is no rash, tenderness or lesion on the right labia. There is no rash, tenderness or lesion on the left labia. Cervix is normal. Right adnexum displays no mass, no tenderness and no fullness. Left adnexum displays no mass, no tenderness and no fullness. No  no vaginal discharge, tenderness, bleeding, rectocele, cystocele or unspecified prolapse of vaginal walls in the vagina.    No foreign body in the vagina.   Cervix exhibits no motion tenderness, no discharge and no friability. Uterus is enlarged, hosting fibroids and uterine contour abnormal . Uterus is not deviated and not tender. Uterus size: 12 cm.Urethral Meatus exhibits: urethral lesionUrethra findings: no urethral mass and no tendernessBladder findings: no bladder tenderness          Musculoskeletal: Normal range of motion.       Neurological: She is alert and oriented to person, place, and time. She has normal reflexes.    Skin: Skin is warm and dry. She is not diaphoretic.    Psychiatric: She has a normal mood and affect. Her speech is normal and behavior is normal. Thought content normal.       Assessment:     1. Leiomyoma, intramural               Plan:     Harrison was seen today for pre-op exam.    Diagnoses and all orders for this visit:    Leiomyoma, intramural  Comments:  Robotic Hysterectomy with bilateral salpingectomy

## 2023-06-12 NOTE — PROGRESS NOTES
I have explained the risks, benefits , and alternatives of laparoscopic hysterectomy bilateral salpingectomy  in detail.  The patient voices understanding and all questions have been answered.  The patient agrees to proceed as planned.  Consent forms for the procedure have been reviewed and signed by the patient.

## 2023-06-22 NOTE — PRE-PROCEDURE INSTRUCTIONS
Pre op instructions reviewed with patient per phone on 6/22/23: Spoke about pre op process and surgery instructions, verbalized understanding.    Surgery is scheduled on 6/30/23.  We will call you the business day prior to surgery to confirm arrival time (after 2:30 pm), as it is subject to change due to cancellations & emergencies.    Please report to the [facility] (1st Floor) at [facility] located off of [facility] (2nd building on the left, in front of the [facility]),address: [address] [place] [address] [place] [id number]    INSTRUCTIONS IMPORTANT!!!  Do Not Eat, Drink, or Smoke after 12 midnight! NO WATER after midnight! OK to brush teeth, no gum, candy or mints!    Take only these medicines with a small swallow of water-morning of surgery:  none    ____  NO Acrylic/fake nails or nail polish worn day of surgery (specifically hand/arm & foot surgeries).  ____  NO powder, lotions, deodorants, oils or creams on body.  ____  Please Remove All jewelry & piercings prior to surgery.  ____  Please Remove Dentures, Hearing Aids & Contact Lens prior to the start of surgery.  ____  Please bring photo ID and insurance information to hospital (Leave Valuables at Home).  ____  If going home the same day, arrange for a ride home. You will not be able to drive 24 hrs if Anesthesia was used.   ____  Females (ages 11-60) may need to give a urine sample the morning of surgery; please see Pre op Nurse prior to voiding.  ____  Wear clean, loose fitting clothing. Allow for dressings, bandages.  ____  Stop all Aspirin products, Ibuprofen, Advil, Motrin & Aleve at least 5-7 days before surgery, unless otherwise instructed by your doctor, or the nurse.   ____  Blood Thinners are stopped based on your Provider's recommendation; Call Surgeon's Office to inquire when to stop/hold.  ____  Stop taking any Fish Oil supplements or Vitamins at least 5 days prior to surgery, unless instructed otherwise by your Doctor.          Diabetic  Patients: If you take diabetic medication, do NOT take morning of surgery unless instructed by             Doctor. Metformin to be stopped 24 hrs prior to surgery time. DO NOT take long-acting insulin the evening before surgery. Blood sugars will be checked in pre-op morning of.    Bathing Instructions:    -Do not shave your face or body the day before or the day of surgery.              -Shower & Rinse your body as usual with anti-bacterial Soap (Dial), on the evening prior to surgery and the morning of surgery.               -Rinse your body thoroughly.                -Pat yourself day with a clean, soft towel.               -Do not use lotion, cream, powder or deodorant               -Dress in clean clothes     [facility] Visitor/Ride Policy:  Only 2 adults allowed (over the age of 18) to accompany you to the Hospital. You Must have a ride home from a responsible adult that you know and trust. Medical Transport, Uber or Lyft can only be used if patient has a responsible adult to accompany them during ride home.    Post-Op Instructions: You will receive Post-op/Discharge instructions by your Discharge Nurse prior to going home. Please call your Surgeon's office with any post-surgery questions/concerns.    *Call [facility] Pre-Admissions Department with surgery instruction questions @ [phone]-Tanya   or [phone]  (Mon-Fri 8 am to 4 pm)    *If you are running late or have questions the morning of surgery, please call the Surgery Dept @ [phone]  *Insurance/ Financial Questions, please call [phone].

## 2023-06-29 ENCOUNTER — TELEPHONE (OUTPATIENT)
Dept: PREADMISSION TESTING | Facility: HOSPITAL | Age: 48
End: 2023-06-29
Payer: COMMERCIAL

## 2023-06-29 ENCOUNTER — PATIENT MESSAGE (OUTPATIENT)
Dept: OBSTETRICS AND GYNECOLOGY | Facility: CLINIC | Age: 48
End: 2023-06-29
Payer: COMMERCIAL

## 2023-06-29 ENCOUNTER — ANESTHESIA EVENT (OUTPATIENT)
Dept: SURGERY | Facility: HOSPITAL | Age: 48
End: 2023-06-29
Payer: COMMERCIAL

## 2023-06-29 NOTE — ANESTHESIA PREPROCEDURE EVALUATION
2023  Harrison Estevez is a 48 y.o., female     Patient Active Problem List   Diagnosis    Leiomyoma, intramural     Past Medical History:   Diagnosis Date    Abnormal Pap smear 2011    Cryosurgery done    General anesthetics causing adverse effect in therapeutic use     slow to wake up following      Past Surgical History:   Procedure Laterality Date    ANKLE SURGERY      APPENDECTOMY      GYNECOLOGIC CRYOSURGERY  2011       Chemistry        Component Value Date/Time     2023 1401    K 4.3 2023 1401     2023 1401    CO2 28 2023 1401    BUN 13 2023 1401    CREATININE 0.8 2023 1401    GLU 74 2023 1401        Component Value Date/Time    CALCIUM 9.3 2023 1401        Lab Results   Component Value Date    WBC 4.93 2023    HGB 11.2 (L) 2023    HCT 37.1 2023    MCV 69 (L) 2023     2023       Pre-op Assessment    I have reviewed the Patient Summary Reports.    I have reviewed the NPO Status.   I have reviewed the Medications.     Review of Systems  Anesthesia Hx:  History of prior surgery of interest to airway management or planning: Previous anesthesia: General   Social:  Non-Smoker    Hematology/Oncology:  Hematology Normal   Oncology Normal     Cardiovascular:  Cardiovascular Normal     Pulmonary:  Pulmonary Normal    Renal/:  Renal/ Normal     Musculoskeletal:  Musculoskeletal Normal    OB/GYN/PEDS:  Intramural fibroid   Neurological:  Neurology Normal    Endocrine:  Endocrine Normal BMI 38 Obesity / BMI > 30      Physical Exam  General: Well nourished    Airway:  Mallampati: II   Mouth Opening: Normal  TM Distance: Normal  Neck ROM: Normal ROM    Dental:  Intact        Anesthesia Plan  Type of Anesthesia, risks & benefits discussed:    Anesthesia Type: Gen ETT  Intra-op Monitoring  Plan: Standard ASA Monitors  Post Op Pain Control Plan: multimodal analgesia and IV/PO Opioids PRN  Induction:  IV  Airway Plan: Direct, Post-Induction  Informed Consent: Informed consent signed with the Patient and all parties understand the risks and agree with anesthesia plan.  All questions answered.   ASA Score: 1  Day of Surgery Review of History & Physical: H&P Update referred to the surgeon/provider.    Ready For Surgery From Anesthesia Perspective.     .

## 2023-06-30 ENCOUNTER — HOSPITAL ENCOUNTER (OUTPATIENT)
Facility: HOSPITAL | Age: 48
Discharge: HOME | End: 2023-06-30
Attending: OBSTETRICS & GYNECOLOGY | Admitting: OBSTETRICS & GYNECOLOGY
Payer: COMMERCIAL

## 2023-06-30 ENCOUNTER — ANESTHESIA (OUTPATIENT)
Dept: SURGERY | Facility: HOSPITAL | Age: 48
End: 2023-06-30
Payer: COMMERCIAL

## 2023-06-30 DIAGNOSIS — Z01.810 PREOP CARDIOVASCULAR EXAM: ICD-10-CM

## 2023-06-30 DIAGNOSIS — D25.1 LEIOMYOMA, INTRAMURAL: ICD-10-CM

## 2023-06-30 PROBLEM — Z90.710 STATUS POST LAPAROSCOPIC HYSTERECTOMY: Status: ACTIVE | Noted: 2023-06-30

## 2023-06-30 LAB
B-HCG UR QL: NEGATIVE
CTP QC/QA: YES

## 2023-06-30 PROCEDURE — 58570 PR LAPAROSCOPY W TOT HYSTERECT UTERUS 250 GRAM OR LESS: ICD-10-PCS | Mod: ,,, | Performed by: OBSTETRICS & GYNECOLOGY

## 2023-06-30 PROCEDURE — 36000712 HC OR TIME LEV V 1ST 15 MIN: Performed by: OBSTETRICS & GYNECOLOGY

## 2023-06-30 PROCEDURE — 58570 PR LAPAROSCOPY W TOT HYSTERECT UTERUS 250 GRAM OR LESS: ICD-10-PCS | Mod: AS,,, | Performed by: PHYSICIAN ASSISTANT

## 2023-06-30 PROCEDURE — 37000008 HC ANESTHESIA 1ST 15 MINUTES: Performed by: OBSTETRICS & GYNECOLOGY

## 2023-06-30 PROCEDURE — 93010 EKG 12-LEAD: ICD-10-PCS | Mod: ,,, | Performed by: INTERNAL MEDICINE

## 2023-06-30 PROCEDURE — 25000003 PHARM REV CODE 250: Performed by: OBSTETRICS & GYNECOLOGY

## 2023-06-30 PROCEDURE — 88307 TISSUE EXAM BY PATHOLOGIST: CPT | Performed by: PATHOLOGY

## 2023-06-30 PROCEDURE — 93010 ELECTROCARDIOGRAM REPORT: CPT | Mod: ,,, | Performed by: INTERNAL MEDICINE

## 2023-06-30 PROCEDURE — 71000015 HC POSTOP RECOV 1ST HR: Performed by: OBSTETRICS & GYNECOLOGY

## 2023-06-30 PROCEDURE — 63600175 PHARM REV CODE 636 W HCPCS: Performed by: NURSE ANESTHETIST, CERTIFIED REGISTERED

## 2023-06-30 PROCEDURE — 88307 PR  SURG PATH,LEVEL V: ICD-10-PCS | Mod: 26,,, | Performed by: PATHOLOGY

## 2023-06-30 PROCEDURE — C2628 CATHETER, OCCLUSION: HCPCS | Performed by: OBSTETRICS & GYNECOLOGY

## 2023-06-30 PROCEDURE — 36000713 HC OR TIME LEV V EA ADD 15 MIN: Performed by: OBSTETRICS & GYNECOLOGY

## 2023-06-30 PROCEDURE — 93005 ELECTROCARDIOGRAM TRACING: CPT

## 2023-06-30 PROCEDURE — 25000003 PHARM REV CODE 250: Performed by: NURSE ANESTHETIST, CERTIFIED REGISTERED

## 2023-06-30 PROCEDURE — 71000033 HC RECOVERY, INTIAL HOUR: Performed by: OBSTETRICS & GYNECOLOGY

## 2023-06-30 PROCEDURE — 88307 TISSUE EXAM BY PATHOLOGIST: CPT | Mod: 26,,, | Performed by: PATHOLOGY

## 2023-06-30 PROCEDURE — 27201423 OPTIME MED/SURG SUP & DEVICES STERILE SUPPLY: Performed by: OBSTETRICS & GYNECOLOGY

## 2023-06-30 PROCEDURE — 37000009 HC ANESTHESIA EA ADD 15 MINS: Performed by: OBSTETRICS & GYNECOLOGY

## 2023-06-30 PROCEDURE — 81025 URINE PREGNANCY TEST: CPT | Performed by: OBSTETRICS & GYNECOLOGY

## 2023-06-30 PROCEDURE — 58570 TLH UTERUS 250 G OR LESS: CPT | Mod: ,,, | Performed by: OBSTETRICS & GYNECOLOGY

## 2023-06-30 PROCEDURE — 63600175 PHARM REV CODE 636 W HCPCS: Performed by: OBSTETRICS & GYNECOLOGY

## 2023-06-30 PROCEDURE — 58570 TLH UTERUS 250 G OR LESS: CPT | Mod: AS,,, | Performed by: PHYSICIAN ASSISTANT

## 2023-06-30 PROCEDURE — 71000039 HC RECOVERY, EACH ADD'L HOUR: Performed by: OBSTETRICS & GYNECOLOGY

## 2023-06-30 RX ORDER — HYDROCODONE BITARTRATE AND ACETAMINOPHEN 5; 325 MG/1; MG/1
1 TABLET ORAL EVERY 6 HOURS PRN
Qty: 15 TABLET | Refills: 0 | Status: SHIPPED | OUTPATIENT
Start: 2023-06-30 | End: 2023-07-07

## 2023-06-30 RX ORDER — HYDROMORPHONE HYDROCHLORIDE 2 MG/ML
0.2 INJECTION, SOLUTION INTRAMUSCULAR; INTRAVENOUS; SUBCUTANEOUS EVERY 5 MIN PRN
Status: DISCONTINUED | OUTPATIENT
Start: 2023-06-30 | End: 2023-06-30 | Stop reason: HOSPADM

## 2023-06-30 RX ORDER — HYDROCODONE BITARTRATE AND ACETAMINOPHEN 5; 325 MG/1; MG/1
1 TABLET ORAL EVERY 4 HOURS PRN
Status: DISCONTINUED | OUTPATIENT
Start: 2023-06-30 | End: 2023-06-30 | Stop reason: HOSPADM

## 2023-06-30 RX ORDER — ONDANSETRON 8 MG/1
8 TABLET, ORALLY DISINTEGRATING ORAL EVERY 8 HOURS PRN
Status: DISCONTINUED | OUTPATIENT
Start: 2023-06-30 | End: 2023-06-30 | Stop reason: HOSPADM

## 2023-06-30 RX ORDER — ROCURONIUM BROMIDE 10 MG/ML
INJECTION, SOLUTION INTRAVENOUS
Status: DISCONTINUED | OUTPATIENT
Start: 2023-06-30 | End: 2023-06-30

## 2023-06-30 RX ORDER — ACETAMINOPHEN 500 MG
1000 TABLET ORAL
Status: COMPLETED | OUTPATIENT
Start: 2023-06-30 | End: 2023-06-30

## 2023-06-30 RX ORDER — SODIUM CHLORIDE, SODIUM LACTATE, POTASSIUM CHLORIDE, CALCIUM CHLORIDE 600; 310; 30; 20 MG/100ML; MG/100ML; MG/100ML; MG/100ML
INJECTION, SOLUTION INTRAVENOUS CONTINUOUS
Status: DISCONTINUED | OUTPATIENT
Start: 2023-06-30 | End: 2023-06-30 | Stop reason: HOSPADM

## 2023-06-30 RX ORDER — KETOROLAC TROMETHAMINE 30 MG/ML
30 INJECTION, SOLUTION INTRAMUSCULAR; INTRAVENOUS
Status: DISCONTINUED | OUTPATIENT
Start: 2023-06-30 | End: 2023-06-30 | Stop reason: HOSPADM

## 2023-06-30 RX ORDER — PROCHLORPERAZINE EDISYLATE 5 MG/ML
5 INJECTION INTRAMUSCULAR; INTRAVENOUS EVERY 10 MIN PRN
Status: DISCONTINUED | OUTPATIENT
Start: 2023-06-30 | End: 2023-06-30 | Stop reason: HOSPADM

## 2023-06-30 RX ORDER — PROPOFOL 10 MG/ML
VIAL (ML) INTRAVENOUS
Status: DISCONTINUED | OUTPATIENT
Start: 2023-06-30 | End: 2023-06-30

## 2023-06-30 RX ORDER — POLYETHYLENE GLYCOL 3350 17 G/17G
17 POWDER, FOR SOLUTION ORAL DAILY
Status: DISCONTINUED | OUTPATIENT
Start: 2023-06-30 | End: 2023-06-30 | Stop reason: HOSPADM

## 2023-06-30 RX ORDER — FAMOTIDINE 20 MG/1
20 TABLET, FILM COATED ORAL
Status: COMPLETED | OUTPATIENT
Start: 2023-06-30 | End: 2023-06-30

## 2023-06-30 RX ORDER — CELECOXIB 100 MG/1
400 CAPSULE ORAL
Status: COMPLETED | OUTPATIENT
Start: 2023-06-30 | End: 2023-06-30

## 2023-06-30 RX ORDER — MIDAZOLAM HYDROCHLORIDE 1 MG/ML
INJECTION, SOLUTION INTRAMUSCULAR; INTRAVENOUS
Status: DISCONTINUED | OUTPATIENT
Start: 2023-06-30 | End: 2023-06-30

## 2023-06-30 RX ORDER — IBUPROFEN 200 MG
800 TABLET ORAL
Status: DISCONTINUED | OUTPATIENT
Start: 2023-07-01 | End: 2023-06-30 | Stop reason: HOSPADM

## 2023-06-30 RX ORDER — SODIUM CHLORIDE 0.9 % (FLUSH) 0.9 %
3 SYRINGE (ML) INJECTION
Status: DISCONTINUED | OUTPATIENT
Start: 2023-06-30 | End: 2023-06-30 | Stop reason: HOSPADM

## 2023-06-30 RX ORDER — PROCHLORPERAZINE EDISYLATE 5 MG/ML
5 INJECTION INTRAMUSCULAR; INTRAVENOUS EVERY 6 HOURS PRN
Status: DISCONTINUED | OUTPATIENT
Start: 2023-06-30 | End: 2023-06-30 | Stop reason: HOSPADM

## 2023-06-30 RX ORDER — MUPIROCIN 20 MG/G
OINTMENT TOPICAL
Status: DISCONTINUED | OUTPATIENT
Start: 2023-06-30 | End: 2023-06-30 | Stop reason: HOSPADM

## 2023-06-30 RX ORDER — ONDANSETRON 2 MG/ML
4 INJECTION INTRAMUSCULAR; INTRAVENOUS DAILY PRN
Status: DISCONTINUED | OUTPATIENT
Start: 2023-06-30 | End: 2023-06-30 | Stop reason: HOSPADM

## 2023-06-30 RX ORDER — DEXAMETHASONE SODIUM PHOSPHATE 4 MG/ML
INJECTION, SOLUTION INTRA-ARTICULAR; INTRALESIONAL; INTRAMUSCULAR; INTRAVENOUS; SOFT TISSUE
Status: DISCONTINUED | OUTPATIENT
Start: 2023-06-30 | End: 2023-06-30

## 2023-06-30 RX ORDER — FENTANYL CITRATE 50 UG/ML
INJECTION, SOLUTION INTRAMUSCULAR; INTRAVENOUS
Status: DISCONTINUED | OUTPATIENT
Start: 2023-06-30 | End: 2023-06-30

## 2023-06-30 RX ORDER — DIPHENHYDRAMINE HCL 25 MG
25 CAPSULE ORAL EVERY 4 HOURS PRN
Status: DISCONTINUED | OUTPATIENT
Start: 2023-06-30 | End: 2023-06-30 | Stop reason: HOSPADM

## 2023-06-30 RX ORDER — CEFAZOLIN SODIUM 1 G/3ML
INJECTION, POWDER, FOR SOLUTION INTRAMUSCULAR; INTRAVENOUS
Status: DISCONTINUED | OUTPATIENT
Start: 2023-06-30 | End: 2023-06-30

## 2023-06-30 RX ORDER — DIPHENHYDRAMINE HYDROCHLORIDE 50 MG/ML
25 INJECTION INTRAMUSCULAR; INTRAVENOUS EVERY 4 HOURS PRN
Status: DISCONTINUED | OUTPATIENT
Start: 2023-06-30 | End: 2023-06-30 | Stop reason: HOSPADM

## 2023-06-30 RX ORDER — HYDROCODONE BITARTRATE AND ACETAMINOPHEN 10; 325 MG/1; MG/1
1 TABLET ORAL EVERY 4 HOURS PRN
Status: DISCONTINUED | OUTPATIENT
Start: 2023-06-30 | End: 2023-06-30 | Stop reason: HOSPADM

## 2023-06-30 RX ORDER — ONDANSETRON 2 MG/ML
INJECTION INTRAMUSCULAR; INTRAVENOUS
Status: DISCONTINUED | OUTPATIENT
Start: 2023-06-30 | End: 2023-06-30

## 2023-06-30 RX ORDER — LIDOCAINE HYDROCHLORIDE 10 MG/ML
0.5 INJECTION, SOLUTION EPIDURAL; INFILTRATION; INTRACAUDAL; PERINEURAL
Status: DISCONTINUED | OUTPATIENT
Start: 2023-06-30 | End: 2023-06-30 | Stop reason: HOSPADM

## 2023-06-30 RX ORDER — LIDOCAINE HYDROCHLORIDE 20 MG/ML
INJECTION INTRAVENOUS
Status: DISCONTINUED | OUTPATIENT
Start: 2023-06-30 | End: 2023-06-30

## 2023-06-30 RX ORDER — HYDROMORPHONE HYDROCHLORIDE 2 MG/ML
1 INJECTION, SOLUTION INTRAMUSCULAR; INTRAVENOUS; SUBCUTANEOUS EVERY 4 HOURS PRN
Status: DISCONTINUED | OUTPATIENT
Start: 2023-06-30 | End: 2023-06-30 | Stop reason: HOSPADM

## 2023-06-30 RX ORDER — ACETAMINOPHEN 325 MG/1
650 TABLET ORAL EVERY 4 HOURS PRN
Status: DISCONTINUED | OUTPATIENT
Start: 2023-06-30 | End: 2023-06-30 | Stop reason: HOSPADM

## 2023-06-30 RX ORDER — AMOXICILLIN 250 MG
1 CAPSULE ORAL 2 TIMES DAILY
Status: DISCONTINUED | OUTPATIENT
Start: 2023-06-30 | End: 2023-06-30 | Stop reason: HOSPADM

## 2023-06-30 RX ORDER — CEFAZOLIN SODIUM 2 G/50ML
2 SOLUTION INTRAVENOUS
Status: DISCONTINUED | OUTPATIENT
Start: 2023-06-30 | End: 2023-06-30 | Stop reason: HOSPADM

## 2023-06-30 RX ORDER — MEPERIDINE HYDROCHLORIDE 25 MG/ML
12.5 INJECTION INTRAMUSCULAR; INTRAVENOUS; SUBCUTANEOUS ONCE AS NEEDED
Status: DISCONTINUED | OUTPATIENT
Start: 2023-06-30 | End: 2023-06-30 | Stop reason: HOSPADM

## 2023-06-30 RX ADMIN — LIDOCAINE HYDROCHLORIDE 100 MG: 20 INJECTION INTRAVENOUS at 07:06

## 2023-06-30 RX ADMIN — DEXAMETHASONE SODIUM PHOSPHATE 8 MG: 4 INJECTION, SOLUTION INTRA-ARTICULAR; INTRALESIONAL; INTRAMUSCULAR; INTRAVENOUS; SOFT TISSUE at 07:06

## 2023-06-30 RX ADMIN — PROPOFOL 150 MG: 10 INJECTION, EMULSION INTRAVENOUS at 07:06

## 2023-06-30 RX ADMIN — SUGAMMADEX 50 MG: 100 INJECTION, SOLUTION INTRAVENOUS at 08:06

## 2023-06-30 RX ADMIN — HYDROMORPHONE HYDROCHLORIDE 0.2 MG: 2 INJECTION INTRAMUSCULAR; INTRAVENOUS; SUBCUTANEOUS at 09:06

## 2023-06-30 RX ADMIN — ROCURONIUM BROMIDE 30 MG: 10 SOLUTION INTRAVENOUS at 07:06

## 2023-06-30 RX ADMIN — FAMOTIDINE 20 MG: 20 TABLET, FILM COATED ORAL at 06:06

## 2023-06-30 RX ADMIN — SUGAMMADEX 100 MG: 100 INJECTION, SOLUTION INTRAVENOUS at 08:06

## 2023-06-30 RX ADMIN — SODIUM CHLORIDE, POTASSIUM CHLORIDE, SODIUM LACTATE AND CALCIUM CHLORIDE: 600; 310; 30; 20 INJECTION, SOLUTION INTRAVENOUS at 06:06

## 2023-06-30 RX ADMIN — ROCURONIUM BROMIDE 20 MG: 10 SOLUTION INTRAVENOUS at 07:06

## 2023-06-30 RX ADMIN — ACETAMINOPHEN 1000 MG: 500 TABLET ORAL at 06:06

## 2023-06-30 RX ADMIN — CEFAZOLIN 2 G: 330 INJECTION, POWDER, FOR SOLUTION INTRAMUSCULAR; INTRAVENOUS at 07:06

## 2023-06-30 RX ADMIN — ONDANSETRON 4 MG: 2 INJECTION INTRAMUSCULAR; INTRAVENOUS at 08:06

## 2023-06-30 RX ADMIN — SODIUM CHLORIDE, POTASSIUM CHLORIDE, SODIUM LACTATE AND CALCIUM CHLORIDE: 600; 310; 30; 20 INJECTION, SOLUTION INTRAVENOUS at 08:06

## 2023-06-30 RX ADMIN — LIDOCAINE HYDROCHLORIDE 100 MG: 20 INJECTION INTRAVENOUS at 08:06

## 2023-06-30 RX ADMIN — CELECOXIB 400 MG: 100 CAPSULE ORAL at 06:06

## 2023-06-30 RX ADMIN — MIDAZOLAM 2 MG: 1 INJECTION INTRAMUSCULAR; INTRAVENOUS at 06:06

## 2023-06-30 RX ADMIN — FENTANYL CITRATE 25 MCG: 50 INJECTION, SOLUTION INTRAMUSCULAR; INTRAVENOUS at 08:06

## 2023-06-30 RX ADMIN — FENTANYL CITRATE 50 MCG: 50 INJECTION, SOLUTION INTRAMUSCULAR; INTRAVENOUS at 07:06

## 2023-06-30 NOTE — H&P
[facility] ([facility])  Obstetrics & Gynecology  History & Physical    Patient Name: Harrison Estevez  MRN: [id number]  Admission Date: 6/30/2023  Primary Care Provider: Primary Doctor No    Subjective:     Chief Complaint/Reason for Admission: uterine bleeding     History of Present Illness:  Abnormal uterine bleeding with known myomas and failed conservative therapy for definitive surgery       No new subjective & objective note has been filed under this hospital service since the last note was generated.    Assessment/Plan:     Renal/  Leiomyoma, intramural  Robotic hysterectomy bilateral salpingo-oophorectomy         F Paul Shine MD  Obstetrics & Gynecology  [facility] ([facility])

## 2023-06-30 NOTE — ANESTHESIA POSTPROCEDURE EVALUATION
Anesthesia Post Evaluation    Patient: Harrison Estevez    Procedure(s) Performed: Procedure(s) (LRB):  XI ROBOTIC HYSTERECTOMY (N/A)    Final Anesthesia Type: general      Patient location during evaluation: PACU  Patient participation: Yes- Able to Participate  Level of consciousness: awake and alert  Post-procedure vital signs: reviewed and stable  Pain management: adequate  Airway patency: patent  JITENDRA mitigation strategies: Verification of full reversal of neuromuscular block  PONV status at discharge: No PONV  Anesthetic complications: no      Cardiovascular status: hemodynamically stable  Respiratory status: spontaneous ventilation  Hydration status: euvolemic  Follow-up not needed.          Vitals Value Taken Time   /74 06/30/23 1045   Temp 36.9 °C (98.4 °F) 06/30/23 1045   Pulse 78 06/30/23 1052   Resp 52 06/30/23 1052   SpO2 97 % 06/30/23 1052   Vitals shown include unvalidated device data.      Event Time   Out of Recovery 10:55:28         Pain/Stanley Score: Pain Rating Prior to Med Admin: 9 (6/30/2023  9:45 AM)  Stanley Score: 9 (6/30/2023 10:30 AM)

## 2023-06-30 NOTE — OP NOTE
'[place] - Surgery ([facility])  Surgery Department  Operative Note    SUMMARY     Date of Procedure: 6/30/2023     Procedure: Procedure(s) (LRB):  XI ROBOTIC HYSTERECTOMY (N/A)     Surgeon(s) and Role:     * BHUMI Shine MD - Primary    Assisting Surgeon: Kathi FRANCO assistance deemed necessary by MD     Pre-Operative Diagnosis: Leiomyoma, intramural [D25.1]  DUB (dysfunctional uterine bleeding) [N93.8]    Post-Operative Diagnosis: Post-Op Diagnosis Codes:     * Leiomyoma, intramural [D25.1]     * DUB (dysfunctional uterine bleeding) [N93.8]    Anesthesia: General    Operative Findings (including complications, if any):     Description of Technical Procedures:     Procedure in Detail/Findings:  Operative findings    Upper abdomen normal liver , no adhesions, no bowel abnormalities    Pelvis:  Uterus 10 week size with several myomas   Ovaries normal ,   both fallopian tubes surgically removed                  No Adhesions, No endometriosis                 Ureters in there normal retroperitoneal position through the pelvis                 Bladder Flap with no adhesions         The patient was taken to the surgical suite.  General              intubation  anesthesia is induced.                                                                   The patient was placed in supine lithotomy  position  with low Clarence stirrups                                                                   The vagina was prepped with Betadine.  A Cade     catheter, SAMUEL manipulator with  KOH colpotomizer placed.    Abdomen prepped   with  chlorhexidine solution                                                                Sterile drapes applied     Time-out taken with all members of the operating team.       Veress  needle placed through the umbilicus, abdomen elevated with towel clamps.     CO2 gas insufflation to 15 mmHg.          Trochar placement as follows:       8 mm bladeless trochars:      4 trochars,  transverse line 2 cm above the umbilicus, 8 cm apart with direct visualization after the initial placement                     The da Nicole robotic XI   system docked from the right side of the patient.     .    Instrument placement as follows   Camera:  Right midline port   Unipolar Scissors:  Right lateral port   [place] Bipolar Forceps:  Left midline port   Fenestrated Forceps:  Left lateral port    The surgeon moved to the robotic console and the first assistant remained at the bedside for uterine manipulation and utilization of instruments placed through the left lateral trochar                                                                              Prior to starting the hysterectomy, the anatomical landmarks of the pelvis and the pelvic course of the ureters right and left are identified.   The [place] bipolar is used for cautery and the scissors used for transection of the pedicles .  The  round ligaments are  Transected after cautery on each side and dissection of the broad ligament and development of the bladder flap is accomplished  with the unipolar scissors.     The utero ovarian ligament transected bilaterally     The anterior and posterior vagina entered over the colpotomizer with unipolar scissors and the colpotomy is extended over the colpotomizer anterior and posterior to the ascending uterine bilateral uterine arteries.    The Uterine vessels are then cauterized with the [place] instrument and transected.     With the uterus free of blood supply and vaginal connection, it is removed with the cervix and bilateral tubes through the vagina.    Bleeding of the uterine pedicle or vaginal cuff is cauterized.   Closure of the vaginal cuff done with Zero Vicryl suture at each angle followed by 2 zero V lock double layer closure of the cuff      The uterosacral ligaments were then plicated with running zero Vicryl bilaterally.  Observation of the ureters showed no evidence of kinking after tying the  suture        All needles used in the abdomen removed through the vagina or  the 8 mm trocar on the left are  accounted for.       Good hemostasis was  Appreciated in the operative field, any bleeding noted cauterized with bipolar instrument     Any excess fluid was suctioned free.    After the instruments were removed, the da Nicole robotic system was undocked        All trochar  sites closed with 4-0 Monocryl subcuticular and Dermabond.       The specimen removed from the vagina.  Vagina examined for any tears.       The  patient taken to Recovery in stable condition with andrews catheter in place               Significant Surgical Tasks Conducted by the Assistant(s), if Applicable: bedside first assist     Estimated Blood Loss (EBL): 30 cc           Implants: * No implants in log *    Specimens:   Specimen (24h ago, onward)       Start     Ordered    06/30/23 0828  Specimen to Pathology, Surgery Gynecology and Obstetrics  Once        Comments: Pre-op Diagnosis: Leiomyoma, intramural [D25.1]DUB (dysfunctional uterine bleeding) [N93.8]Procedure(s):XI ROBOTIC HYSTERECTOMY Number of specimens: 1Name of specimens: 1)  cervix & uterus --perm     References:    Click here for ordering Quick Tip   Question Answer Comment   Procedure Type: Gynecology and Obstetrics    Specimen Class: Routine/Screening    Which provider would you like to cc? BHUMI COOPER    Release to patient Immediate        06/30/23 0828                            Condition: Good    Disposition: PACU - hemodynamically stable.    Attestation: I was present and scrubbed for the entire procedure.

## 2023-06-30 NOTE — ANESTHESIA PROCEDURE NOTES
Intubation    Date/Time: 6/30/2023 7:12 AM  Performed by: Lissett Heck CRNA  Authorized by: Mika Farrell II, MD     Intubation:     Induction:  Intravenous    Intubated:  Postinduction    Mask Ventilation:  Easy mask    Attempts:  1    Attempted By:  CRNA    Method of Intubation:  Video laryngoscopy    Blade:  Wilda 2    Laryngeal View Grade: Grade I - full view of cords      Difficult Airway Encountered?: No      Complications:  None    Airway Device:  Oral endotracheal tube    Airway Device Size:  7.5    Style/Cuff Inflation:  Cuffed (inflated to minimal occlusive pressure)    Tube secured:  22    Secured at:  The lips    Placement Verified By:  Capnometry    Complicating Factors:  None    Findings Post-Intubation:  BS equal bilateral and atraumatic/condition of teeth unchanged    
English

## 2023-06-30 NOTE — DISCHARGE SUMMARY
[facility] - Surgery (Hospital)  Discharge Note  Short Stay    Procedure(s) (LRB):  XI ROBOTIC HYSTERECTOMY (N/A)      OUTCOME: Patient tolerated treatment/procedure well without complication and is now ready for discharge.    DISPOSITION: Home or Self Care    FINAL DIAGNOSIS:  Leiomyoma, intramural    FOLLOWUP: In clinic    DISCHARGE INSTRUCTIONS:  No discharge procedures on file.      Clinical Reference Documents Added to Patient Instructions         Document    HYDROCODONE AND ACETAMINOPHEN, ADULT (ENGLISH)    HYSTERECTOMY DISCHARGE INSTRUCTIONS (ENGLISH)            TIME SPENT ON DISCHARGE: 5 minutes

## 2023-06-30 NOTE — HPI
Abnormal uterine bleeding with known myomas and failed conservative therapy for definitive surgery

## 2023-06-30 NOTE — TRANSFER OF CARE
"Anesthesia Transfer of Care Note    Patient: Harrison Estevez    Procedure(s) Performed: Procedure(s) (LRB):  XI ROBOTIC HYSTERECTOMY (N/A)    Patient location: PACU    Anesthesia Type: general    Transport from OR: Transported from OR on room air with adequate spontaneous ventilation    Post pain: adequate analgesia    Post assessment: no apparent anesthetic complications    Post vital signs: stable    Level of consciousness: sedated    Nausea/Vomiting: no nausea/vomiting    Complications: none    Transfer of care protocol was followed      Last vitals:   Visit Vitals  BP (!) 146/74   Pulse 81   Temp 36.6 °C (97.9 °F) (Skin)   Resp 16   Ht 5' 4" (1.626 m)   Wt 101.1 kg (222 lb 15.9 oz)   LMP 04/14/2023   SpO2 98%   Breastfeeding No   BMI 38.28 kg/m²     "

## 2023-07-03 ENCOUNTER — PATIENT MESSAGE (OUTPATIENT)
Dept: OBSTETRICS AND GYNECOLOGY | Facility: CLINIC | Age: 48
End: 2023-07-03
Payer: COMMERCIAL

## 2023-07-03 VITALS
BODY MASS INDEX: 38.07 KG/M2 | RESPIRATION RATE: 12 BRPM | HEART RATE: 73 BPM | WEIGHT: 223 LBS | TEMPERATURE: 98 F | HEIGHT: 64 IN | DIASTOLIC BLOOD PRESSURE: 74 MMHG | SYSTOLIC BLOOD PRESSURE: 123 MMHG | OXYGEN SATURATION: 97 %

## 2023-07-03 LAB
FINAL PATHOLOGIC DIAGNOSIS: NORMAL
GROSS: NORMAL
Lab: NORMAL

## 2023-07-19 ENCOUNTER — TELEPHONE (OUTPATIENT)
Dept: OBSTETRICS AND GYNECOLOGY | Facility: CLINIC | Age: 48
End: 2023-07-19
Payer: COMMERCIAL

## 2023-07-19 NOTE — TELEPHONE ENCOUNTER
----- Message from Sammi Witt sent at 7/19/2023  1:46 PM CDT -----  Contact: pt  Type: Needs Medical Advice  Who Called:  pt     Best Call Back Number: [phone]    Additional Information: pt states she is bleeding from vagina since yesterday she would like to know if that's something expected after the hysterectomy. Please advise.

## 2023-07-19 NOTE — TELEPHONE ENCOUNTER
Pt called in regards to bleeding following BM due to Constipation Pt states not heavy, but have some cramping as well advised pt to monitor bleeding and if continues to call back for darryl to be assessed  Pt verbalizes understanding       Francia WATTS LPN  OB/GYN

## 2023-07-31 ENCOUNTER — PATIENT MESSAGE (OUTPATIENT)
Dept: OBSTETRICS AND GYNECOLOGY | Facility: CLINIC | Age: 48
End: 2023-07-31
Payer: COMMERCIAL

## 2023-08-02 ENCOUNTER — OFFICE VISIT (OUTPATIENT)
Dept: OBSTETRICS AND GYNECOLOGY | Facility: CLINIC | Age: 48
End: 2023-08-02
Payer: COMMERCIAL

## 2023-08-02 VITALS
SYSTOLIC BLOOD PRESSURE: 110 MMHG | BODY MASS INDEX: 37.83 KG/M2 | HEIGHT: 64 IN | WEIGHT: 221.56 LBS | DIASTOLIC BLOOD PRESSURE: 60 MMHG

## 2023-08-02 DIAGNOSIS — Z90.710 STATUS POST LAPAROSCOPIC HYSTERECTOMY: Primary | ICD-10-CM

## 2023-08-02 PROCEDURE — 3078F PR MOST RECENT DIASTOLIC BLOOD PRESSURE < 80 MM HG: ICD-10-PCS | Mod: CPTII,S$GLB,, | Performed by: OBSTETRICS & GYNECOLOGY

## 2023-08-02 PROCEDURE — 99999 PR PBB SHADOW E&M-EST. PATIENT-LVL III: ICD-10-PCS | Mod: PBBFAC,,, | Performed by: OBSTETRICS & GYNECOLOGY

## 2023-08-02 PROCEDURE — 99024 POSTOP FOLLOW-UP VISIT: CPT | Mod: S$GLB,,, | Performed by: OBSTETRICS & GYNECOLOGY

## 2023-08-02 PROCEDURE — 3008F BODY MASS INDEX DOCD: CPT | Mod: CPTII,S$GLB,, | Performed by: OBSTETRICS & GYNECOLOGY

## 2023-08-02 PROCEDURE — 99024 PR POST-OP FOLLOW-UP VISIT: ICD-10-PCS | Mod: S$GLB,,, | Performed by: OBSTETRICS & GYNECOLOGY

## 2023-08-02 PROCEDURE — 3074F PR MOST RECENT SYSTOLIC BLOOD PRESSURE < 130 MM HG: ICD-10-PCS | Mod: CPTII,S$GLB,, | Performed by: OBSTETRICS & GYNECOLOGY

## 2023-08-02 PROCEDURE — 3078F DIAST BP <80 MM HG: CPT | Mod: CPTII,S$GLB,, | Performed by: OBSTETRICS & GYNECOLOGY

## 2023-08-02 PROCEDURE — 1159F MED LIST DOCD IN RCRD: CPT | Mod: CPTII,S$GLB,, | Performed by: OBSTETRICS & GYNECOLOGY

## 2023-08-02 PROCEDURE — 3008F PR BODY MASS INDEX (BMI) DOCUMENTED: ICD-10-PCS | Mod: CPTII,S$GLB,, | Performed by: OBSTETRICS & GYNECOLOGY

## 2023-08-02 PROCEDURE — 3074F SYST BP LT 130 MM HG: CPT | Mod: CPTII,S$GLB,, | Performed by: OBSTETRICS & GYNECOLOGY

## 2023-08-02 PROCEDURE — 99999 PR PBB SHADOW E&M-EST. PATIENT-LVL III: CPT | Mod: PBBFAC,,, | Performed by: OBSTETRICS & GYNECOLOGY

## 2023-08-02 PROCEDURE — 1159F PR MEDICATION LIST DOCUMENTED IN MEDICAL RECORD: ICD-10-PCS | Mod: CPTII,S$GLB,, | Performed by: OBSTETRICS & GYNECOLOGY

## 2023-08-02 NOTE — PROGRESS NOTES
"  Subjective:       Patient ID: Harrison Estevez is a 48 y.o. female.    Chief Complaint:  Post-op Evaluation      History of Present Illness  HPI  Post op robotic hysterectomy   Salpingectomy in the past   Ovaries are intact   Pathology benign  No problems   Wounds are healing as expected     Health Maintenance   Topic Date Due    Lipid Panel  Never done    TETANUS VACCINE  Never done    Mammogram  2024    Hepatitis C Screening  Completed     GYN & OB History  Patient's last menstrual period was 2023.   Date of Last Pap: 2022    OB History    Para Term  AB Living   3 3 3     3   SAB IAB Ectopic Multiple Live Births           3      # Outcome Date GA Lbr Indra/2nd Weight Sex Delivery Anes PTL Lv   3 Term 08    F CS-LTranv   SARAY   2 Term 89    F Vag-Spont   SARAY   1 Term 89    M Vag-Spont   SARAY       Review of Systems  Review of Systems        Objective:   /60   Ht 5' 4" (1.626 m)   Wt 100.5 kg (221 lb 9 oz)   LMP 2023   BMI 38.03 kg/m²    Physical Exam     Assessment:        1. Status post laparoscopic hysterectomy                Plan:            Harrison was seen today for post-op evaluation.    Diagnoses and all orders for this visit:    Status post laparoscopic hysterectomy    Follow up 2 year for pelvic exam NO PAP   Pelvic rest to continue for 8 weeks     "

## 2023-08-09 ENCOUNTER — TELEPHONE (OUTPATIENT)
Dept: OBSTETRICS AND GYNECOLOGY | Facility: CLINIC | Age: 48
End: 2023-08-09
Payer: COMMERCIAL

## 2023-08-09 NOTE — TELEPHONE ENCOUNTER
----- Message from Karine Méndez LPN sent at 8/8/2023  3:46 PM CDT -----  Contact: Harrison Farrell Afternoon,     Pt called in regards to FMLA paperwork, she's checking on status, and wanted you to giver her a call.     Thank you,   Francia  ----- Message -----  From: Anand Khan  Sent: 8/8/2023   2:15 PM CDT  To: [facility] Staff    .Type:  Needs Medical Advice    Who Called: Harrison  Symptoms (please be specific): FMLA paperwork   Would the patient rather a call back or a response via [facility]? Call back or [employer] message   Best Call Back Number: [phone]  Additional Information: wants to know if it is time to  paerwork            Thanks  SERA

## 2023-09-19 ENCOUNTER — PATIENT MESSAGE (OUTPATIENT)
Dept: OBSTETRICS AND GYNECOLOGY | Facility: CLINIC | Age: 48
End: 2023-09-19
Payer: COMMERCIAL

## 2023-09-20 ENCOUNTER — PATIENT MESSAGE (OUTPATIENT)
Dept: OBSTETRICS AND GYNECOLOGY | Facility: CLINIC | Age: 48
End: 2023-09-20
Payer: COMMERCIAL

## 2024-01-02 ENCOUNTER — HOSPITAL ENCOUNTER (INPATIENT)
Facility: HOSPITAL | Age: 49
LOS: 7 days | Discharge: NURSING, REHAB OR LONG-TERM CARE | DRG: 493 | End: 2024-01-10
Attending: EMERGENCY MEDICINE | Admitting: HOSPITALIST
Payer: COMMERCIAL

## 2024-01-02 DIAGNOSIS — S82.401A CLOSED FRACTURE OF RIGHT TIBIA AND FIBULA, INITIAL ENCOUNTER: Primary | ICD-10-CM

## 2024-01-02 DIAGNOSIS — M25.571 RIGHT ANKLE PAIN: ICD-10-CM

## 2024-01-02 DIAGNOSIS — S82.261A CLOSED DISPLACED SEGMENTAL FRACTURE OF SHAFT OF RIGHT TIBIA, INITIAL ENCOUNTER: ICD-10-CM

## 2024-01-02 DIAGNOSIS — R10.9 FLANK PAIN: ICD-10-CM

## 2024-01-02 DIAGNOSIS — Z47.89 ORTHOPEDIC AFTERCARE: ICD-10-CM

## 2024-01-02 DIAGNOSIS — S82.141A FRACTURE OF TIBIAL PLATEAU, CLOSED, RIGHT, INITIAL ENCOUNTER: ICD-10-CM

## 2024-01-02 DIAGNOSIS — S82.401A TIBIA/FIBULA FRACTURE, RIGHT, CLOSED, INITIAL ENCOUNTER: ICD-10-CM

## 2024-01-02 DIAGNOSIS — S82.201A TIBIA/FIBULA FRACTURE, RIGHT, CLOSED, INITIAL ENCOUNTER: ICD-10-CM

## 2024-01-02 DIAGNOSIS — S82.131A CLOSED FRACTURE OF MEDIAL PORTION OF RIGHT TIBIAL PLATEAU, INITIAL ENCOUNTER: ICD-10-CM

## 2024-01-02 DIAGNOSIS — M25.551 RIGHT HIP PAIN: ICD-10-CM

## 2024-01-02 DIAGNOSIS — S82.401A CLOSED FRACTURE OF SHAFT OF RIGHT FIBULA, UNSPECIFIED FRACTURE MORPHOLOGY, INITIAL ENCOUNTER: ICD-10-CM

## 2024-01-02 DIAGNOSIS — S84.01XA: ICD-10-CM

## 2024-01-02 DIAGNOSIS — M25.561 RIGHT KNEE PAIN: ICD-10-CM
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
## 2024-01-02 DIAGNOSIS — S82.201A CLOSED FRACTURE OF RIGHT TIBIA AND FIBULA, INITIAL ENCOUNTER: Primary | ICD-10-CM

## 2024-01-02 DIAGNOSIS — R07.9 CHEST PAIN: ICD-10-CM

## 2024-01-02 DIAGNOSIS — G57.31 RIGHT PERONEAL NERVE PALSY: ICD-10-CM

## 2024-01-02 LAB
BASOPHILS # BLD AUTO: 0.03 K/UL (ref 0–0.2)
BASOPHILS NFR BLD: 0.3 % (ref 0–1.9)
DIFFERENTIAL METHOD BLD: ABNORMAL
EOSINOPHIL # BLD AUTO: 0.1 K/UL (ref 0–0.5)
EOSINOPHIL NFR BLD: 0.8 % (ref 0–8)
ERYTHROCYTE [DISTWIDTH] IN BLOOD BY AUTOMATED COUNT: 14.2 % (ref 11.5–14.5)
HCT VFR BLD AUTO: 35.4 % (ref 37–48.5)
HGB BLD-MCNC: 11 G/DL (ref 12–16)
IMM GRANULOCYTES # BLD AUTO: 0.03 K/UL (ref 0–0.04)
IMM GRANULOCYTES NFR BLD AUTO: 0.3 % (ref 0–0.5)
LYMPHOCYTES # BLD AUTO: 1.6 K/UL (ref 1–4.8)
LYMPHOCYTES NFR BLD: 16.2 % (ref 18–48)
MCH RBC QN AUTO: 21.5 PG (ref 27–31)
MCHC RBC AUTO-ENTMCNC: 31.1 G/DL (ref 32–36)
MCV RBC AUTO: 69 FL (ref 82–98)
MONOCYTES # BLD AUTO: 0.5 K/UL (ref 0.3–1)
MONOCYTES NFR BLD: 4.9 % (ref 4–15)
NEUTROPHILS # BLD AUTO: 7.7 K/UL (ref 1.8–7.7)
NEUTROPHILS NFR BLD: 77.5 % (ref 38–73)
NRBC BLD-RTO: 0 /100 WBC
PLATELET # BLD AUTO: 377 K/UL (ref 150–450)
PMV BLD AUTO: 8.6 FL (ref 9.2–12.9)
RBC # BLD AUTO: 5.12 M/UL (ref 4–5.4)
WBC # BLD AUTO: 9.97 K/UL (ref 3.9–12.7)

## 2024-01-02 PROCEDURE — 80053 COMPREHEN METABOLIC PANEL: CPT | Performed by: EMERGENCY MEDICINE

## 2024-01-02 PROCEDURE — 96376 TX/PRO/DX INJ SAME DRUG ADON: CPT

## 2024-01-02 PROCEDURE — 63600175 PHARM REV CODE 636 W HCPCS: Mod: JZ,JG | Performed by: EMERGENCY MEDICINE

## 2024-01-02 PROCEDURE — 96374 THER/PROPH/DIAG INJ IV PUSH: CPT

## 2024-01-02 PROCEDURE — 99285 EMERGENCY DEPT VISIT HI MDM: CPT | Mod: 25

## 2024-01-02 PROCEDURE — 85025 COMPLETE CBC W/AUTO DIFF WBC: CPT | Performed by: EMERGENCY MEDICINE

## 2024-01-02 PROCEDURE — 81003 URINALYSIS AUTO W/O SCOPE: CPT | Performed by: EMERGENCY MEDICINE

## 2024-01-02 RX ORDER — MORPHINE SULFATE 10 MG/ML
10 INJECTION INTRAMUSCULAR; INTRAVENOUS; SUBCUTANEOUS
Status: COMPLETED | OUTPATIENT
Start: 2024-01-02 | End: 2024-01-02

## 2024-01-02 RX ADMIN — MORPHINE SULFATE 10 MG: 10 INJECTION, SOLUTION INTRAMUSCULAR; INTRAVENOUS at 10:01

## 2024-01-03 PROBLEM — M79.18 MUSCULOSKELETAL PAIN: Status: ACTIVE | Noted: 2024-01-03

## 2024-01-03 PROBLEM — S82.141A: Status: ACTIVE | Noted: 2024-01-03

## 2024-01-03 PROBLEM — R10.9 FLANK PAIN: Status: ACTIVE | Noted: 2024-01-03
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
## 2024-01-03 PROBLEM — S82.401A CLOSED FRACTURE OF SHAFT OF RIGHT FIBULA: Status: ACTIVE | Noted: 2024-01-03

## 2024-01-03 PROBLEM — S84.01XA INJURY OF RIGHT TIBIAL NERVE: Status: ACTIVE | Noted: 2024-01-03

## 2024-01-03 PROBLEM — G57.31 RIGHT PERONEAL NERVE PALSY: Status: ACTIVE | Noted: 2024-01-03

## 2024-01-03 PROBLEM — S82.261A: Status: ACTIVE | Noted: 2024-01-03

## 2024-01-03 PROBLEM — S82.201A TIBIA/FIBULA FRACTURE, RIGHT, CLOSED, INITIAL ENCOUNTER: Status: ACTIVE | Noted: 2024-01-03

## 2024-01-03 PROBLEM — S82.401A TIBIA/FIBULA FRACTURE, RIGHT, CLOSED, INITIAL ENCOUNTER: Status: ACTIVE | Noted: 2024-01-03

## 2024-01-03 LAB
ALBUMIN SERPL BCP-MCNC: 3.6 G/DL (ref 3.5–5.2)
ALP SERPL-CCNC: 88 U/L (ref 55–135)
ALT SERPL W/O P-5'-P-CCNC: 14 U/L (ref 10–44)
ANION GAP SERPL CALC-SCNC: 13 MMOL/L (ref 8–16)
AST SERPL-CCNC: 25 U/L (ref 10–40)
B-HCG UR QL: NEGATIVE
BILIRUB SERPL-MCNC: 0.2 MG/DL (ref 0.1–1)
BILIRUB UR QL STRIP: NEGATIVE
BUN SERPL-MCNC: 14 MG/DL (ref 6–20)
CALCIUM SERPL-MCNC: 8.7 MG/DL (ref 8.7–10.5)
CHLORIDE SERPL-SCNC: 105 MMOL/L (ref 95–110)
CLARITY UR: CLEAR
CO2 SERPL-SCNC: 22 MMOL/L (ref 23–29)
COLOR UR: COLORLESS
CREAT SERPL-MCNC: 0.7 MG/DL (ref 0.5–1.4)
EST. GFR  (NO RACE VARIABLE): >60 ML/MIN/1.73 M^2
GLUCOSE SERPL-MCNC: 108 MG/DL (ref 70–110)
GLUCOSE UR QL STRIP: NEGATIVE
HGB UR QL STRIP: NEGATIVE
KETONES UR QL STRIP: NEGATIVE
LEUKOCYTE ESTERASE UR QL STRIP: NEGATIVE
NITRITE UR QL STRIP: NEGATIVE
PH UR STRIP: 7 [PH] (ref 5–8)
POTASSIUM SERPL-SCNC: 3.8 MMOL/L (ref 3.5–5.1)
PROT SERPL-MCNC: 7.1 G/DL (ref 6–8.4)
PROT UR QL STRIP: NEGATIVE
SODIUM SERPL-SCNC: 140 MMOL/L (ref 136–145)
SP GR UR STRIP: 1.01 (ref 1–1.03)
URN SPEC COLLECT METH UR: ABNORMAL
UROBILINOGEN UR STRIP-ACNC: NEGATIVE EU/DL

## 2024-01-03 PROCEDURE — 11000001 HC ACUTE MED/SURG PRIVATE ROOM

## 2024-01-03 PROCEDURE — 63600175 PHARM REV CODE 636 W HCPCS: Mod: JZ,JG | Performed by: EMERGENCY MEDICINE

## 2024-01-03 PROCEDURE — 25000003 PHARM REV CODE 250: Performed by: NURSE PRACTITIONER

## 2024-01-03 PROCEDURE — 63600175 PHARM REV CODE 636 W HCPCS: Performed by: HOSPITALIST

## 2024-01-03 PROCEDURE — 63600175 PHARM REV CODE 636 W HCPCS: Performed by: NURSE PRACTITIONER

## 2024-01-03 PROCEDURE — 99223 1ST HOSP IP/OBS HIGH 75: CPT | Mod: 57,,, | Performed by: ORTHOPAEDIC SURGERY

## 2024-01-03 PROCEDURE — 93010 ELECTROCARDIOGRAM REPORT: CPT | Mod: ,,, | Performed by: INTERNAL MEDICINE

## 2024-01-03 PROCEDURE — 93005 ELECTROCARDIOGRAM TRACING: CPT

## 2024-01-03 PROCEDURE — 27000221 HC OXYGEN, UP TO 24 HOURS

## 2024-01-03 PROCEDURE — 99900035 HC TECH TIME PER 15 MIN (STAT)

## 2024-01-03 PROCEDURE — 94761 N-INVAS EAR/PLS OXIMETRY MLT: CPT

## 2024-01-03 RX ORDER — MORPHINE SULFATE 10 MG/ML
10 INJECTION INTRAMUSCULAR; INTRAVENOUS; SUBCUTANEOUS
Status: COMPLETED | OUTPATIENT
Start: 2024-01-03 | End: 2024-01-03

## 2024-01-03 RX ORDER — ONDANSETRON 2 MG/ML
4 INJECTION INTRAMUSCULAR; INTRAVENOUS EVERY 8 HOURS PRN
Status: DISCONTINUED | OUTPATIENT
Start: 2024-01-03 | End: 2024-01-10 | Stop reason: HOSPADM

## 2024-01-03 RX ORDER — SIMETHICONE 80 MG
1 TABLET,CHEWABLE ORAL 4 TIMES DAILY PRN
Status: DISCONTINUED | OUTPATIENT
Start: 2024-01-03 | End: 2024-01-10 | Stop reason: HOSPADM

## 2024-01-03 RX ORDER — HYDROMORPHONE HYDROCHLORIDE 2 MG/ML
1 INJECTION, SOLUTION INTRAMUSCULAR; INTRAVENOUS; SUBCUTANEOUS ONCE
Status: COMPLETED | OUTPATIENT
Start: 2024-01-03 | End: 2024-01-03

## 2024-01-03 RX ORDER — SODIUM CHLORIDE 0.9 % (FLUSH) 0.9 %
3 SYRINGE (ML) INJECTION EVERY 12 HOURS PRN
Status: DISCONTINUED | OUTPATIENT
Start: 2024-01-03 | End: 2024-01-10 | Stop reason: HOSPADM

## 2024-01-03 RX ORDER — IBUPROFEN 200 MG
24 TABLET ORAL
Status: DISCONTINUED | OUTPATIENT
Start: 2024-01-03 | End: 2024-01-10 | Stop reason: HOSPADM

## 2024-01-03 RX ORDER — IBUPROFEN 200 MG
16 TABLET ORAL
Status: DISCONTINUED | OUTPATIENT
Start: 2024-01-03 | End: 2024-01-10 | Stop reason: HOSPADM

## 2024-01-03 RX ORDER — HYDROMORPHONE HYDROCHLORIDE 2 MG/ML
1 INJECTION, SOLUTION INTRAMUSCULAR; INTRAVENOUS; SUBCUTANEOUS EVERY 4 HOURS PRN
Status: DISCONTINUED | OUTPATIENT
Start: 2024-01-03 | End: 2024-01-10

## 2024-01-03 RX ORDER — SODIUM CHLORIDE, SODIUM LACTATE, POTASSIUM CHLORIDE, CALCIUM CHLORIDE 600; 310; 30; 20 MG/100ML; MG/100ML; MG/100ML; MG/100ML
INJECTION, SOLUTION INTRAVENOUS CONTINUOUS
Status: DISCONTINUED | OUTPATIENT
Start: 2024-01-03 | End: 2024-01-06

## 2024-01-03 RX ORDER — GLUCAGON 1 MG
1 KIT INJECTION
Status: DISCONTINUED | OUTPATIENT
Start: 2024-01-03 | End: 2024-01-10 | Stop reason: HOSPADM

## 2024-01-03 RX ORDER — HYDROCODONE BITARTRATE AND ACETAMINOPHEN 5; 325 MG/1; MG/1
1 TABLET ORAL EVERY 4 HOURS PRN
Status: DISCONTINUED | OUTPATIENT
Start: 2024-01-03 | End: 2024-01-10 | Stop reason: HOSPADM

## 2024-01-03 RX ORDER — POLYETHYLENE GLYCOL 3350 17 G/17G
17 POWDER, FOR SOLUTION ORAL DAILY PRN
Status: DISCONTINUED | OUTPATIENT
Start: 2024-01-03 | End: 2024-01-10 | Stop reason: HOSPADM

## 2024-01-03 RX ORDER — PROMETHAZINE HYDROCHLORIDE 25 MG/1
25 TABLET ORAL EVERY 6 HOURS PRN
Status: DISCONTINUED | OUTPATIENT
Start: 2024-01-03 | End: 2024-01-10 | Stop reason: HOSPADM

## 2024-01-03 RX ORDER — ACETAMINOPHEN 325 MG/1
650 TABLET ORAL EVERY 6 HOURS PRN
Status: DISCONTINUED | OUTPATIENT
Start: 2024-01-03 | End: 2024-01-05

## 2024-01-03 RX ORDER — MORPHINE SULFATE 4 MG/ML
2 INJECTION, SOLUTION INTRAMUSCULAR; INTRAVENOUS EVERY 4 HOURS PRN
Status: DISCONTINUED | OUTPATIENT
Start: 2024-01-03 | End: 2024-01-03

## 2024-01-03 RX ORDER — MAG HYDROX/ALUMINUM HYD/SIMETH 200-200-20
30 SUSPENSION, ORAL (FINAL DOSE FORM) ORAL 4 TIMES DAILY PRN
Status: DISCONTINUED | OUTPATIENT
Start: 2024-01-03 | End: 2024-01-10 | Stop reason: HOSPADM

## 2024-01-03 RX ORDER — HYDROCODONE BITARTRATE AND ACETAMINOPHEN 5; 325 MG/1; MG/1
1 TABLET ORAL EVERY 6 HOURS PRN
Status: DISCONTINUED | OUTPATIENT
Start: 2024-01-03 | End: 2024-01-03

## 2024-01-03 RX ORDER — ACETAMINOPHEN 650 MG/1
650 SUPPOSITORY RECTAL EVERY 4 HOURS PRN
Status: DISCONTINUED | OUTPATIENT
Start: 2024-01-03 | End: 2024-01-05

## 2024-01-03 RX ORDER — METHOCARBAMOL 750 MG/1
750 TABLET, FILM COATED ORAL 3 TIMES DAILY
Status: DISCONTINUED | OUTPATIENT
Start: 2024-01-03 | End: 2024-01-10 | Stop reason: HOSPADM

## 2024-01-03 RX ORDER — HYDROMORPHONE HYDROCHLORIDE 2 MG/ML
1 INJECTION, SOLUTION INTRAMUSCULAR; INTRAVENOUS; SUBCUTANEOUS ONCE
Status: DISCONTINUED | OUTPATIENT
Start: 2024-01-03 | End: 2024-01-03

## 2024-01-03 RX ORDER — NALOXONE HCL 0.4 MG/ML
0.02 VIAL (ML) INJECTION
Status: DISCONTINUED | OUTPATIENT
Start: 2024-01-03 | End: 2024-01-10 | Stop reason: HOSPADM

## 2024-01-03 RX ORDER — TALC
6 POWDER (GRAM) TOPICAL NIGHTLY PRN
Status: DISCONTINUED | OUTPATIENT
Start: 2024-01-03 | End: 2024-01-10 | Stop reason: HOSPADM

## 2024-01-03 RX ADMIN — PROMETHAZINE HYDROCHLORIDE 25 MG: 25 TABLET ORAL at 03:01

## 2024-01-03 RX ADMIN — ONDANSETRON 4 MG: 2 INJECTION INTRAMUSCULAR; INTRAVENOUS at 03:01

## 2024-01-03 RX ADMIN — METHOCARBAMOL 750 MG: 750 TABLET ORAL at 03:01

## 2024-01-03 RX ADMIN — HYDROMORPHONE HYDROCHLORIDE 1 MG: 2 INJECTION INTRAMUSCULAR; INTRAVENOUS; SUBCUTANEOUS at 03:01

## 2024-01-03 RX ADMIN — HYDROCODONE BITARTRATE AND ACETAMINOPHEN 1 TABLET: 5; 325 TABLET ORAL at 09:01

## 2024-01-03 RX ADMIN — SODIUM CHLORIDE, POTASSIUM CHLORIDE, SODIUM LACTATE AND CALCIUM CHLORIDE: 600; 310; 30; 20 INJECTION, SOLUTION INTRAVENOUS at 04:01

## 2024-01-03 RX ADMIN — ONDANSETRON 4 MG: 2 INJECTION INTRAMUSCULAR; INTRAVENOUS at 11:01

## 2024-01-03 RX ADMIN — METHOCARBAMOL 750 MG: 750 TABLET ORAL at 08:01

## 2024-01-03 RX ADMIN — MORPHINE SULFATE 10 MG: 10 INJECTION, SOLUTION INTRAMUSCULAR; INTRAVENOUS at 12:01

## 2024-01-03 RX ADMIN — HYDROMORPHONE HYDROCHLORIDE 1 MG: 2 INJECTION INTRAMUSCULAR; INTRAVENOUS; SUBCUTANEOUS at 04:01

## 2024-01-03 RX ADMIN — HYDROMORPHONE HYDROCHLORIDE 1 MG: 2 INJECTION INTRAMUSCULAR; INTRAVENOUS; SUBCUTANEOUS at 09:01

## 2024-01-03 NOTE — ASSESSMENT & PLAN NOTE
RCRI revealed class 1 risk with 3.9% 30 day risk of death, mi, cardiac arrest.  Plan:  -NPO  -Continue current pain regimen, titrate as needed  -Bowel regimen  -Bedrest  -None weightbearing RLE  -Continue splint per ortho recs  -IVFs prn  -Antiemetics prn  -Tylenol as needed for fever   -PT/OT   -ortho consulted
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
## 2024-01-03 NOTE — PHARMACY MED REC
"Admission Medication History     The home medication history was taken by Mono Sánchez.    You may go to "Admission" then "Reconcile Home Medications" tabs to review and/or act upon these items.     The home medication list has been updated by the Pharmacy department.   Please read ALL comments highlighted in yellow.   Please address this information as you see fit.    Feel free to contact us if you have any questions or require assistance.      Medications listed below were obtained from: [employer] and Medical records  (Not in a hospital admission)      Mono Sánchez  [id number]    Current Outpatient Medications on File Prior to Encounter   Medication Sig Dispense Refill Last Dose   NO CURRENT OUTPATIENT MEDICATIONS                        .          "
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
## 2024-01-04 ENCOUNTER — ANESTHESIA EVENT (OUTPATIENT)
Dept: SURGERY | Facility: HOSPITAL | Age: 49
DRG: 493 | End: 2024-01-04
Payer: COMMERCIAL

## 2024-01-04 ENCOUNTER — ANESTHESIA (OUTPATIENT)
Dept: SURGERY | Facility: HOSPITAL | Age: 49
DRG: 493 | End: 2024-01-04
Payer: COMMERCIAL

## 2024-01-04 PROCEDURE — 63600175 PHARM REV CODE 636 W HCPCS: Performed by: NURSE ANESTHETIST, CERTIFIED REGISTERED

## 2024-01-04 PROCEDURE — 27000221 HC OXYGEN, UP TO 24 HOURS

## 2024-01-04 PROCEDURE — 36000710: Performed by: ORTHOPAEDIC SURGERY

## 2024-01-04 PROCEDURE — C1713 ANCHOR/SCREW BN/BN,TIS/BN: HCPCS | Performed by: ORTHOPAEDIC SURGERY

## 2024-01-04 PROCEDURE — 63600175 PHARM REV CODE 636 W HCPCS: Performed by: ORTHOPAEDIC SURGERY

## 2024-01-04 PROCEDURE — 63600175 PHARM REV CODE 636 W HCPCS: Performed by: NURSE PRACTITIONER

## 2024-01-04 PROCEDURE — 94761 N-INVAS EAR/PLS OXIMETRY MLT: CPT

## 2024-01-04 PROCEDURE — 11000001 HC ACUTE MED/SURG PRIVATE ROOM

## 2024-01-04 PROCEDURE — 27759 TREATMENT OF TIBIA FRACTURE: CPT | Mod: RT,,, | Performed by: ORTHOPAEDIC SURGERY

## 2024-01-04 PROCEDURE — 71000033 HC RECOVERY, INTIAL HOUR: Performed by: ORTHOPAEDIC SURGERY

## 2024-01-04 PROCEDURE — 0QSG06Z REPOSITION RIGHT TIBIA WITH INTRAMEDULLARY INTERNAL FIXATION DEVICE, OPEN APPROACH: ICD-10-PCS | Performed by: ORTHOPAEDIC SURGERY

## 2024-01-04 PROCEDURE — 36000711: Performed by: ORTHOPAEDIC SURGERY

## 2024-01-04 PROCEDURE — 37000009 HC ANESTHESIA EA ADD 15 MINS: Performed by: ORTHOPAEDIC SURGERY

## 2024-01-04 PROCEDURE — 63600175 PHARM REV CODE 636 W HCPCS: Performed by: ANESTHESIOLOGY

## 2024-01-04 PROCEDURE — 99900035 HC TECH TIME PER 15 MIN (STAT)

## 2024-01-04 PROCEDURE — 27201423 OPTIME MED/SURG SUP & DEVICES STERILE SUPPLY: Performed by: ORTHOPAEDIC SURGERY

## 2024-01-04 PROCEDURE — 25000003 PHARM REV CODE 250: Performed by: NURSE PRACTITIONER

## 2024-01-04 PROCEDURE — 37000008 HC ANESTHESIA 1ST 15 MINUTES: Performed by: ORTHOPAEDIC SURGERY

## 2024-01-04 PROCEDURE — C1769 GUIDE WIRE: HCPCS | Performed by: ORTHOPAEDIC SURGERY

## 2024-01-04 PROCEDURE — 25000003 PHARM REV CODE 250: Performed by: NURSE ANESTHETIST, CERTIFIED REGISTERED

## 2024-01-04 PROCEDURE — 71000039 HC RECOVERY, EACH ADD'L HOUR: Performed by: ORTHOPAEDIC SURGERY

## 2024-01-04 DEVICE — TIBIAL NAIL
Type: IMPLANTABLE DEVICE | Site: TIBIA | Status: FUNCTIONAL
Brand: T2 ALPHA

## 2024-01-04 DEVICE — LOCKING SCREW
Type: IMPLANTABLE DEVICE | Site: TIBIA | Status: FUNCTIONAL
Brand: T2 ALPHA

## 2024-01-04 DEVICE — END CAP LOWER EXTREMITY
Type: IMPLANTABLE DEVICE | Site: TIBIA | Status: FUNCTIONAL
Brand: T2 ALPHA

## 2024-01-04 RX ORDER — KETOROLAC TROMETHAMINE 30 MG/ML
15 INJECTION, SOLUTION INTRAMUSCULAR; INTRAVENOUS EVERY 8 HOURS PRN
Status: DISCONTINUED | OUTPATIENT
Start: 2024-01-04 | End: 2024-01-04 | Stop reason: HOSPADM

## 2024-01-04 RX ORDER — ONDANSETRON 2 MG/ML
4 INJECTION INTRAMUSCULAR; INTRAVENOUS DAILY PRN
Status: DISCONTINUED | OUTPATIENT
Start: 2024-01-04 | End: 2024-01-04 | Stop reason: HOSPADM

## 2024-01-04 RX ORDER — MIDAZOLAM HYDROCHLORIDE 1 MG/ML
INJECTION, SOLUTION INTRAMUSCULAR; INTRAVENOUS
Status: DISCONTINUED | OUTPATIENT
Start: 2024-01-04 | End: 2024-01-04

## 2024-01-04 RX ORDER — PROPOFOL 10 MG/ML
VIAL (ML) INTRAVENOUS
Status: DISCONTINUED | OUTPATIENT
Start: 2024-01-04 | End: 2024-01-04

## 2024-01-04 RX ORDER — ALBUTEROL SULFATE 0.83 MG/ML
2.5 SOLUTION RESPIRATORY (INHALATION) EVERY 4 HOURS PRN
Status: DISCONTINUED | OUTPATIENT
Start: 2024-01-04 | End: 2024-01-04 | Stop reason: HOSPADM

## 2024-01-04 RX ORDER — ROCURONIUM BROMIDE 10 MG/ML
INJECTION, SOLUTION INTRAVENOUS
Status: DISCONTINUED | OUTPATIENT
Start: 2024-01-04 | End: 2024-01-04

## 2024-01-04 RX ORDER — FENTANYL CITRATE 50 UG/ML
INJECTION, SOLUTION INTRAMUSCULAR; INTRAVENOUS
Status: DISCONTINUED | OUTPATIENT
Start: 2024-01-04 | End: 2024-01-04

## 2024-01-04 RX ORDER — SODIUM CHLORIDE 0.9 % (FLUSH) 0.9 %
3 SYRINGE (ML) INJECTION
Status: DISCONTINUED | OUTPATIENT
Start: 2024-01-04 | End: 2024-01-10 | Stop reason: HOSPADM

## 2024-01-04 RX ORDER — ONDANSETRON 2 MG/ML
INJECTION INTRAMUSCULAR; INTRAVENOUS
Status: DISCONTINUED | OUTPATIENT
Start: 2024-01-04 | End: 2024-01-04

## 2024-01-04 RX ORDER — CEFAZOLIN SODIUM 2 G/50ML
2 SOLUTION INTRAVENOUS
Status: COMPLETED | OUTPATIENT
Start: 2024-01-04 | End: 2024-01-05

## 2024-01-04 RX ORDER — DIPHENHYDRAMINE HYDROCHLORIDE 50 MG/ML
25 INJECTION, SOLUTION INTRAMUSCULAR; INTRAVENOUS EVERY 6 HOURS PRN
Status: DISCONTINUED | OUTPATIENT
Start: 2024-01-04 | End: 2024-01-04 | Stop reason: HOSPADM

## 2024-01-04 RX ORDER — DEXAMETHASONE SODIUM PHOSPHATE 4 MG/ML
INJECTION, SOLUTION INTRA-ARTICULAR; INTRALESIONAL; INTRAMUSCULAR; INTRAVENOUS; SOFT TISSUE
Status: DISCONTINUED | OUTPATIENT
Start: 2024-01-04 | End: 2024-01-04

## 2024-01-04 RX ORDER — HYDROMORPHONE HYDROCHLORIDE 2 MG/ML
0.2 INJECTION, SOLUTION INTRAMUSCULAR; INTRAVENOUS; SUBCUTANEOUS EVERY 5 MIN PRN
Status: DISCONTINUED | OUTPATIENT
Start: 2024-01-04 | End: 2024-01-04 | Stop reason: HOSPADM

## 2024-01-04 RX ORDER — MEPERIDINE HYDROCHLORIDE 25 MG/ML
12.5 INJECTION INTRAMUSCULAR; INTRAVENOUS; SUBCUTANEOUS ONCE
Status: COMPLETED | OUTPATIENT
Start: 2024-01-04 | End: 2024-01-04

## 2024-01-04 RX ORDER — CEFAZOLIN SODIUM 1 G/3ML
INJECTION, POWDER, FOR SOLUTION INTRAMUSCULAR; INTRAVENOUS
Status: DISCONTINUED | OUTPATIENT
Start: 2024-01-04 | End: 2024-01-04

## 2024-01-04 RX ORDER — PROCHLORPERAZINE EDISYLATE 5 MG/ML
5 INJECTION INTRAMUSCULAR; INTRAVENOUS EVERY 30 MIN PRN
Status: DISCONTINUED | OUTPATIENT
Start: 2024-01-04 | End: 2024-01-04 | Stop reason: HOSPADM

## 2024-01-04 RX ORDER — LIDOCAINE HYDROCHLORIDE 20 MG/ML
INJECTION INTRAVENOUS
Status: DISCONTINUED | OUTPATIENT
Start: 2024-01-04 | End: 2024-01-04

## 2024-01-04 RX ORDER — LABETALOL HYDROCHLORIDE 5 MG/ML
INJECTION, SOLUTION INTRAVENOUS
Status: DISCONTINUED | OUTPATIENT
Start: 2024-01-04 | End: 2024-01-04

## 2024-01-04 RX ORDER — OXYCODONE HYDROCHLORIDE 5 MG/1
5 TABLET ORAL
Status: DISCONTINUED | OUTPATIENT
Start: 2024-01-04 | End: 2024-01-04 | Stop reason: HOSPADM

## 2024-01-04 RX ADMIN — PROPOFOL 150 MG: 10 INJECTION, EMULSION INTRAVENOUS at 01:01

## 2024-01-04 RX ADMIN — SODIUM CHLORIDE, POTASSIUM CHLORIDE, SODIUM LACTATE AND CALCIUM CHLORIDE: 600; 310; 30; 20 INJECTION, SOLUTION INTRAVENOUS at 02:01

## 2024-01-04 RX ADMIN — HYDROMORPHONE HYDROCHLORIDE 0.2 MG: 2 INJECTION INTRAMUSCULAR; INTRAVENOUS; SUBCUTANEOUS at 05:01

## 2024-01-04 RX ADMIN — PROPOFOL 20 MG: 10 INJECTION, EMULSION INTRAVENOUS at 03:01

## 2024-01-04 RX ADMIN — ONDANSETRON 4 MG: 2 INJECTION INTRAMUSCULAR; INTRAVENOUS at 02:01

## 2024-01-04 RX ADMIN — FENTANYL CITRATE 25 MCG: 50 INJECTION, SOLUTION INTRAMUSCULAR; INTRAVENOUS at 03:01

## 2024-01-04 RX ADMIN — DEXAMETHASONE SODIUM PHOSPHATE 4 MG: 4 INJECTION, SOLUTION INTRA-ARTICULAR; INTRALESIONAL; INTRAMUSCULAR; INTRAVENOUS; SOFT TISSUE at 01:01

## 2024-01-04 RX ADMIN — PROPOFOL 30 MG: 10 INJECTION, EMULSION INTRAVENOUS at 02:01

## 2024-01-04 RX ADMIN — CEFAZOLIN 2 G: 330 INJECTION, POWDER, FOR SOLUTION INTRAMUSCULAR; INTRAVENOUS at 01:01

## 2024-01-04 RX ADMIN — MEPERIDINE HYDROCHLORIDE 12.5 MG: 25 INJECTION INTRAMUSCULAR; INTRAVENOUS; SUBCUTANEOUS at 04:01

## 2024-01-04 RX ADMIN — HYDROMORPHONE HYDROCHLORIDE 1 MG: 2 INJECTION INTRAMUSCULAR; INTRAVENOUS; SUBCUTANEOUS at 04:01

## 2024-01-04 RX ADMIN — HYDROCODONE BITARTRATE AND ACETAMINOPHEN 1 TABLET: 5; 325 TABLET ORAL at 07:01

## 2024-01-04 RX ADMIN — FENTANYL CITRATE 100 MCG: 50 INJECTION, SOLUTION INTRAMUSCULAR; INTRAVENOUS at 01:01

## 2024-01-04 RX ADMIN — FENTANYL CITRATE 50 MCG: 50 INJECTION, SOLUTION INTRAMUSCULAR; INTRAVENOUS at 03:01

## 2024-01-04 RX ADMIN — ROCURONIUM BROMIDE 50 MG: 10 INJECTION, SOLUTION INTRAVENOUS at 01:01

## 2024-01-04 RX ADMIN — LABETALOL HYDROCHLORIDE 10 MG: 5 INJECTION, SOLUTION INTRAVENOUS at 03:01

## 2024-01-04 RX ADMIN — MIDAZOLAM 2 MG: 1 INJECTION INTRAMUSCULAR; INTRAVENOUS at 01:01

## 2024-01-04 RX ADMIN — LABETALOL HYDROCHLORIDE 10 MG: 5 INJECTION, SOLUTION INTRAVENOUS at 04:01

## 2024-01-04 RX ADMIN — CEFAZOLIN SODIUM 2 G: 2 SOLUTION INTRAVENOUS at 11:01

## 2024-01-04 RX ADMIN — HYDROMORPHONE HYDROCHLORIDE 0.2 MG: 2 INJECTION INTRAMUSCULAR; INTRAVENOUS; SUBCUTANEOUS at 04:01

## 2024-01-04 RX ADMIN — FENTANYL CITRATE 25 MCG: 50 INJECTION, SOLUTION INTRAMUSCULAR; INTRAVENOUS at 04:01

## 2024-01-04 RX ADMIN — SODIUM CHLORIDE, POTASSIUM CHLORIDE, SODIUM LACTATE AND CALCIUM CHLORIDE: 600; 310; 30; 20 INJECTION, SOLUTION INTRAVENOUS at 01:01

## 2024-01-04 RX ADMIN — LIDOCAINE HYDROCHLORIDE 100 MG: 20 INJECTION INTRAVENOUS at 01:01

## 2024-01-04 RX ADMIN — SUGAMMADEX 200 MG: 100 INJECTION, SOLUTION INTRAVENOUS at 03:01

## 2024-01-04 RX ADMIN — METHOCARBAMOL 750 MG: 750 TABLET ORAL at 09:01

## 2024-01-04 RX ADMIN — HYDROMORPHONE HYDROCHLORIDE 1 MG: 2 INJECTION INTRAMUSCULAR; INTRAVENOUS; SUBCUTANEOUS at 12:01

## 2024-01-04 RX ADMIN — HYDROMORPHONE HYDROCHLORIDE 1 MG: 2 INJECTION INTRAMUSCULAR; INTRAVENOUS; SUBCUTANEOUS at 06:01

## 2024-01-04 RX ADMIN — SODIUM CHLORIDE, POTASSIUM CHLORIDE, SODIUM LACTATE AND CALCIUM CHLORIDE: 600; 310; 30; 20 INJECTION, SOLUTION INTRAVENOUS at 09:01

## 2024-01-04 NOTE — TRANSFER OF CARE
"Anesthesia Transfer of Care Note    Patient: Harrison Estevez    Procedure(s) Performed: Procedure(s) (LRB):  INSERTION, INTRAMEDULLARY JANIS, TIBIA (Right)    Patient location: PACU    Anesthesia Type: general    Transport from OR: Transported from OR on room air with adequate spontaneous ventilation    Post pain: adequate analgesia    Post assessment: no apparent anesthetic complications    Post vital signs: stable    Level of consciousness: sedated    Nausea/Vomiting: no nausea/vomiting    Complications: none    Transfer of care protocol was followed      Last vitals: Visit Vitals  BP (!) 186/71 (BP Location: Right arm, Patient Position: Lying)   Pulse 90   Temp 37.2 °C (99 °F) (Temporal)   Resp 14   Ht 5' 3" (1.6 m)   Wt 111 kg (244 lb 11.4 oz)   LMP 04/12/2023   SpO2 98%   Breastfeeding No   BMI 43.35 kg/m²     "

## 2024-01-04 NOTE — ANESTHESIA PROCEDURE NOTES
Intubation    Date/Time: 1/4/2024 1:45 PM    Performed by: Deion Ruano CRNA  Authorized by: Anand Laura MD    Intubation:     Induction:  Intravenous    Intubated:  Postinduction    Mask Ventilation:  Easy mask    Attempts:  1    Attempted By:  Student    Method of Intubation:  Direct    Blade:  Wilda 3    Laryngeal View Grade: Grade I - full view of cords      Difficult Airway Encountered?: No      Complications:  None    Airway Device:  Oral endotracheal tube    Airway Device Size:  7.5    Style/Cuff Inflation:  Cuffed (inflated to minimal occlusive pressure)    Tube secured:  20    Secured at:  The lips    Placement Verified By:  Capnometry    Complicating Factors:  None    Findings Post-Intubation:  BS equal bilateral and atraumatic/condition of teeth unchanged

## 2024-01-04 NOTE — PLAN OF CARE
Patient is stable. No signs or symptoms of acute distress. IVFs given. Pain control with pain meds. Bed in lowest position, call light in reach. Chart orders reviewed.

## 2024-01-04 NOTE — PLAN OF CARE
Pt resting on bed s/p IM Oz rt tibia. Respirations even and unlabored on 3L O2 via NC with O2 sats of 99%. VSS. Will cont to monitor. See flow sheet for detailed assessment.

## 2024-01-04 NOTE — PROGRESS NOTES
"PREOP DIAGNOSIS:  RIGHT Segmental tibial fracture with tibial plateau fracture    PLANNED PROCEDURE: RIGHT tibia ORIF and IMN    Patient seen and examined in pre-op holding.      Patient reports the feeling in her leg has come back    Physical Exam  Vitals:    01/04/24 0742   BP: (!) 143/63   Pulse: 77   Resp: 16   Temp: 98.3 °F (36.8 °C)       GEN NAD, well appearing    PSYCH A&Ox3, pleasant    Right LEG Skin intact.  Sensation to light touch now normal on dorsal foot,first web space, plantar foot.  Now can intiate motion but not full move toes.       PLAN    Risks and benefits reviewed with patient including possible failure of procedure to relieve symptoms, need for further surgery, risks of infection, bleeding, damage to nerves/arteries/tendons/cartilage/ligaments, blood clots, pneumonia and risks of anesthesia.  Patient given an opportunity to ask questions.  When her  questions were answered, she decided to proceed with surgery.       Extremity marked with the word "yes" and my initials.        Pre-op Antibiotic to be given by anesthesia    "
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
## 2024-01-05 PROBLEM — Z47.89 ORTHOPEDIC AFTERCARE: Status: ACTIVE | Noted: 2024-01-05

## 2024-01-05 PROCEDURE — 25000003 PHARM REV CODE 250: Performed by: ORTHOPAEDIC SURGERY

## 2024-01-05 PROCEDURE — 97166 OT EVAL MOD COMPLEX 45 MIN: CPT

## 2024-01-05 PROCEDURE — 97535 SELF CARE MNGMENT TRAINING: CPT

## 2024-01-05 PROCEDURE — 63600175 PHARM REV CODE 636 W HCPCS: Performed by: NURSE PRACTITIONER

## 2024-01-05 PROCEDURE — 63600175 PHARM REV CODE 636 W HCPCS: Performed by: HOSPITALIST

## 2024-01-05 PROCEDURE — 97530 THERAPEUTIC ACTIVITIES: CPT

## 2024-01-05 PROCEDURE — 25000003 PHARM REV CODE 250: Performed by: NURSE PRACTITIONER

## 2024-01-05 PROCEDURE — 63600175 PHARM REV CODE 636 W HCPCS: Performed by: ORTHOPAEDIC SURGERY

## 2024-01-05 PROCEDURE — 99024 POSTOP FOLLOW-UP VISIT: CPT | Mod: ,,, | Performed by: ORTHOPAEDIC SURGERY

## 2024-01-05 PROCEDURE — 25000003 PHARM REV CODE 250: Performed by: HOSPITALIST

## 2024-01-05 PROCEDURE — 36410 VNPNXR 3YR/> PHY/QHP DX/THER: CPT

## 2024-01-05 PROCEDURE — 97162 PT EVAL MOD COMPLEX 30 MIN: CPT

## 2024-01-05 PROCEDURE — C1751 CATH, INF, PER/CENT/MIDLINE: HCPCS

## 2024-01-05 PROCEDURE — 11000001 HC ACUTE MED/SURG PRIVATE ROOM

## 2024-01-05 RX ORDER — OXYCODONE AND ACETAMINOPHEN 10; 325 MG/1; MG/1
1 TABLET ORAL EVERY 8 HOURS PRN
Status: DISCONTINUED | OUTPATIENT
Start: 2024-01-05 | End: 2024-01-10 | Stop reason: HOSPADM

## 2024-01-05 RX ORDER — GABAPENTIN 300 MG/1
300 CAPSULE ORAL 3 TIMES DAILY
Status: DISCONTINUED | OUTPATIENT
Start: 2024-01-05 | End: 2024-01-10 | Stop reason: HOSPADM

## 2024-01-05 RX ORDER — ACETAMINOPHEN 650 MG/1
650 SUPPOSITORY RECTAL EVERY 4 HOURS PRN
Status: DISCONTINUED | OUTPATIENT
Start: 2024-01-05 | End: 2024-01-10 | Stop reason: HOSPADM

## 2024-01-05 RX ORDER — ACETAMINOPHEN 325 MG/1
650 TABLET ORAL EVERY 6 HOURS PRN
Status: DISCONTINUED | OUTPATIENT
Start: 2024-01-05 | End: 2024-01-10 | Stop reason: HOSPADM

## 2024-01-05 RX ORDER — ENOXAPARIN SODIUM 100 MG/ML
40 INJECTION SUBCUTANEOUS EVERY 12 HOURS
Status: DISCONTINUED | OUTPATIENT
Start: 2024-01-05 | End: 2024-01-09

## 2024-01-05 RX ORDER — ENOXAPARIN SODIUM 100 MG/ML
40 INJECTION SUBCUTANEOUS EVERY 24 HOURS
Status: DISCONTINUED | OUTPATIENT
Start: 2024-01-05 | End: 2024-01-05

## 2024-01-05 RX ADMIN — CEFAZOLIN SODIUM 2 G: 2 SOLUTION INTRAVENOUS at 06:01

## 2024-01-05 RX ADMIN — HYDROMORPHONE HYDROCHLORIDE 1 MG: 2 INJECTION INTRAMUSCULAR; INTRAVENOUS; SUBCUTANEOUS at 07:01

## 2024-01-05 RX ADMIN — GABAPENTIN 300 MG: 300 CAPSULE ORAL at 10:01

## 2024-01-05 RX ADMIN — OXYCODONE AND ACETAMINOPHEN 1 TABLET: 10; 325 TABLET ORAL at 02:01

## 2024-01-05 RX ADMIN — ENOXAPARIN SODIUM 40 MG: 100 INJECTION SUBCUTANEOUS at 09:01

## 2024-01-05 RX ADMIN — METHOCARBAMOL 750 MG: 750 TABLET ORAL at 09:01

## 2024-01-05 RX ADMIN — METHOCARBAMOL 750 MG: 750 TABLET ORAL at 10:01

## 2024-01-05 RX ADMIN — HYDROMORPHONE HYDROCHLORIDE 1 MG: 2 INJECTION INTRAMUSCULAR; INTRAVENOUS; SUBCUTANEOUS at 09:01

## 2024-01-05 RX ADMIN — GABAPENTIN 300 MG: 300 CAPSULE ORAL at 09:01

## 2024-01-05 RX ADMIN — GABAPENTIN 300 MG: 300 CAPSULE ORAL at 02:01

## 2024-01-05 RX ADMIN — METHOCARBAMOL 750 MG: 750 TABLET ORAL at 02:01

## 2024-01-05 RX ADMIN — OXYCODONE AND ACETAMINOPHEN 1 TABLET: 10; 325 TABLET ORAL at 10:01

## 2024-01-05 RX ADMIN — HYDROMORPHONE HYDROCHLORIDE 1 MG: 2 INJECTION INTRAMUSCULAR; INTRAVENOUS; SUBCUTANEOUS at 12:01

## 2024-01-05 NOTE — PLAN OF CARE
See eval for details. Pt displayed deficits with functional mobility/ transfers, deficits with adl's skills also decrease b ue strength/endurance. Recommendation: HIGH INTENSITY

## 2024-01-05 NOTE — PLAN OF CARE
Problem: Functional Ability Impaired (Orthopaedic Fracture)  Goal: Optimal Functional Ability  Outcome: Ongoing, Progressing     Problem: Infection (Orthopaedic Fracture)  Goal: Absence of Infection Signs and Symptoms  Outcome: Ongoing, Progressing     Problem: Pain (Orthopaedic Fracture)  Goal: Acceptable Pain Control  Outcome: Ongoing, Progressing

## 2024-01-05 NOTE — SUBJECTIVE & OBJECTIVE
Review of Systems   Constitutional:  Negative for fatigue and fever.   Respiratory:  Negative for cough, chest tightness and wheezing.    Cardiovascular:  Negative for chest pain and palpitations.   Gastrointestinal:  Negative for abdominal pain, diarrhea, nausea and vomiting.   Genitourinary:  Negative for dysuria, hematuria and urgency.   Musculoskeletal:  Positive for arthralgias, gait problem and myalgias.   Neurological:  Positive for weakness. Negative for dizziness, seizures and headaches.   Psychiatric/Behavioral:  Negative for confusion. The patient is not nervous/anxious.      Objective:     Vital Signs (Most Recent):  Temp: 99 °F (37.2 °C) (01/04/24 1813)  Pulse: 84 (01/04/24 1813)  Resp: 18 (01/04/24 1813)  BP: (!) 174/86 (01/04/24 1813)  SpO2: 99 % (01/04/24 1813) Vital Signs (24h Range):  Temp:  [98.2 °F (36.8 °C)-99.3 °F (37.4 °C)] 99 °F (37.2 °C)  Pulse:  [75-90] 84  Resp:  [13-20] 18  SpO2:  [94 %-100 %] 99 %  BP: (114-186)/(59-86) 174/86     Weight: 111 kg (244 lb 11.4 oz)  Body mass index is 43.35 kg/m².    Intake/Output Summary (Last 24 hours) at 1/4/2024 1819  Last data filed at 1/4/2024 1555  Gross per 24 hour   Intake 2598.32 ml   Output 2000 ml   Net 598.32 ml      Physical Exam  Constitutional:       Appearance: Normal appearance. She is ill-appearing.   HENT:      Head: Normocephalic.   Eyes:      Extraocular Movements: Extraocular movements intact.      Conjunctiva/sclera: Conjunctivae normal.   Cardiovascular:      Rate and Rhythm: Normal rate and regular rhythm.      Pulses: Normal pulses.   Pulmonary:      Effort: Pulmonary effort is normal. No respiratory distress.   Abdominal:      General: Abdomen is flat.      Palpations: Abdomen is soft.      Tenderness: There is no abdominal tenderness.   Musculoskeletal:      Comments: RLE: wrapped in ace bandage up to thigh. Placed in tall boot. Elevated on two pillows.    Skin:     General: Skin is warm.   Neurological:      General: No focal  deficit present.      Mental Status: She is alert and oriented to person, place, and time.   Psychiatric:         Mood and Affect: Mood normal.     Significant Labs: All pertinent labs within the past 24 hours have been reviewed.  CBC:   Recent Labs   Lab 01/02/24  2323   WBC 9.97   HGB 11.0*   HCT 35.4*        CMP:   Recent Labs   Lab 01/02/24  2323      K 3.8      CO2 22*      BUN 14   CREATININE 0.7   CALCIUM 8.7   PROT 7.1   ALBUMIN 3.6   BILITOT 0.2   ALKPHOS 88   AST 25   ALT 14   ANIONGAP 13     Significant Imaging: I have reviewed all pertinent imaging results/findings within the past 24 hours.  I have reviewed and interpreted all pertinent imaging results/findings within the past 24 hours.
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
## 2024-01-05 NOTE — SUBJECTIVE & OBJECTIVE
Review of Systems   Constitutional:  Positive for appetite change. Negative for fatigue and fever.   Respiratory:  Negative for cough, chest tightness, shortness of breath and wheezing.    Cardiovascular:  Negative for chest pain and palpitations.   Gastrointestinal:  Negative for abdominal pain, nausea and vomiting.   Genitourinary:  Negative for decreased urine volume, difficulty urinating, dysuria and urgency.   Musculoskeletal:  Positive for arthralgias and myalgias.   Neurological:  Negative for dizziness, syncope, weakness and headaches.   Psychiatric/Behavioral:  Positive for agitation. Negative for confusion. The patient is nervous/anxious.      Objective:     Vital Signs (Most Recent):  Temp: 98.5 °F (36.9 °C) (01/05/24 0754)  Pulse: 95 (01/05/24 0754)  Resp: 18 (01/05/24 1018)  BP: 125/60 (01/05/24 0754)  SpO2: 97 % (01/05/24 0754) Vital Signs (24h Range):  Temp:  [98.5 °F (36.9 °C)-99.3 °F (37.4 °C)] 98.5 °F (36.9 °C)  Pulse:  [] 95  Resp:  [13-20] 18  SpO2:  [96 %-100 %] 97 %  BP: (123-186)/(58-86) 125/60     Weight: 106.2 kg (234 lb 2.1 oz)  Body mass index is 41.47 kg/m².    Intake/Output Summary (Last 24 hours) at 1/5/2024 1121  Last data filed at 1/5/2024 0957  Gross per 24 hour   Intake 1556.23 ml   Output 2650 ml   Net -1093.77 ml      Physical Exam  Constitutional:       Appearance: Normal appearance. She is not ill-appearing.   HENT:      Head: Normocephalic.   Eyes:      Extraocular Movements: Extraocular movements intact.      Conjunctiva/sclera: Conjunctivae normal.   Cardiovascular:      Rate and Rhythm: Normal rate and regular rhythm.      Pulses: Normal pulses.   Pulmonary:      Effort: Pulmonary effort is normal. No respiratory distress.   Abdominal:      General: Abdomen is flat.      Palpations: Abdomen is soft.   Musculoskeletal:      Right lower leg: Edema present.      Comments: RLE: dressed with ace bandage and tall boot. Able to wiggle toes. Sslt intact. Elevated with 2  "pillows   Skin:     General: Skin is warm.      Capillary Refill: Capillary refill takes less than 2 seconds.   Neurological:      Mental Status: She is alert and oriented to person, place, and time.   Psychiatric:         Mood and Affect: Mood normal.         Behavior: Behavior normal.         Thought Content: Thought content normal.         Judgment: Judgment normal.     Significant Labs: All pertinent labs within the past 24 hours have been reviewed.  CBC: No results for input(s): "WBC", "HGB", "HCT", "PLT" in the last 48 hours.  CMP: No results for input(s): "NA", "K", "CL", "CO2", "GLU", "BUN", "CREATININE", "CALCIUM", "PROT", "ALBUMIN", "BILITOT", "ALKPHOS", "AST", "ALT", "ANIONGAP", "EGFRNONAA" in the last 48 hours.    Invalid input(s): "ESTGFAFR[place]"    Significant Imaging: I have reviewed all pertinent imaging results/findings within the past 24 hours.  I have reviewed and interpreted all pertinent imaging results/findings within the past 24 hours.  "
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
## 2024-01-05 NOTE — ASSESSMENT & PLAN NOTE
- POD1 ORIF of tibial fracture w/ IMN  - Continue pain control, per ortho Gabapentin tid added  - DVT Prophylaxis: Lovenox  - NWB to RLE  - Continue PT/OT

## 2024-01-05 NOTE — ANESTHESIA POSTPROCEDURE EVALUATION
Anesthesia Post Evaluation    Patient: Harrison Estevez    Procedure(s) Performed: Procedure(s) (LRB):  INSERTION, INTRAMEDULLARY JANIS, TIBIA (Right)    Final Anesthesia Type: general      Patient location during evaluation: PACU  Patient participation: Yes- Able to Participate  Level of consciousness: awake  Post-procedure vital signs: reviewed and stable  Pain management: adequate  Airway patency: patent    PONV status at discharge: No PONV  Anesthetic complications: no      Cardiovascular status: hemodynamically stable  Respiratory status: unassisted  Hydration status: euvolemic  Follow-up not needed.              Vitals Value Taken Time   /60 01/05/24 0754   Temp 36.9 °C (98.5 °F) 01/05/24 0754   Pulse 95 01/05/24 0754   Resp 18 01/05/24 0754   SpO2 97 % 01/05/24 0754         Event Time   Out of Recovery 01/04/2024 17:45:00         Pain/Stanley Score: Pain Rating Prior to Med Admin: 10 (1/5/2024  7:56 AM)  Pain Rating Post Med Admin: 5 (1/4/2024  6:35 PM)  Stanley Score: 7 (1/4/2024  7:05 PM)
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
## 2024-01-05 NOTE — PROGRESS NOTES
Pharmacist Renal Dose Adjustment Note    Harrison Estevez is a 48 y.o. female being treated with the medication enoxaparin.    Patient Data:    Vital Signs (Most Recent):  Temp: 98.5 °F (36.9 °C) (01/05/24 0754)  Pulse: 95 (01/05/24 0754)  Resp: 18 (01/05/24 1018)  BP: 125/60 (01/05/24 0754)  SpO2: 97 % (01/05/24 0754) Vital Signs (72h Range):  Temp:  [98.2 °F (36.8 °C)-99.3 °F (37.4 °C)]   Pulse:  []   Resp:  [13-34]   BP: ()/(53-86)   SpO2:  [92 %-100 %]      Recent Labs   Lab 01/02/24  2323   CREATININE 0.7     Serum creatinine: 0.7 mg/dL 01/02/24 2323  Estimated creatinine clearance: 112.2 mL/min    Medication: enoxaparin dose: 40 mg frequency every 24 hours will be changed to medication: enoxaparin dose: 40 mg frequency: every 12 hours, for BMI 40-50    Pharmacist's Name: Kelin Vasquez

## 2024-01-05 NOTE — PLAN OF CARE
Discussed plan of care with patient and daughter at the bedside, This patient verbalized understanding.  Patient remains free from injury.  Safety and comfort  precautions maintained this shift.   Call light and personal belongings within reach, bed in low position with bed wheels locked. Side rails up X2.  No s/s of acute distress.   Purposeful rounding continued this shift.  Pain levels are  controlled per MD order. IVF infusing.  Blood glucose monitoring continued this shift.  Vital signs continued per order.  Q2 repositioning assisted by staff  Patient has the right leg up on a pillow with an immobiler in place.  Chart and orders review completed.   Patient education about care completed.     Problem: Adult Inpatient Plan of Care  Goal: Plan of Care Review  Outcome: Ongoing, Progressing  Goal: Patient-Specific Goal (Individualized)  Outcome: Ongoing, Progressing  Goal: Absence of Hospital-Acquired Illness or Injury  Outcome: Ongoing, Progressing  Goal: Optimal Comfort and Wellbeing  Outcome: Ongoing, Progressing  Goal: Readiness for Transition of Care  Outcome: Ongoing, Progressing     Problem: Skin Injury Risk Increased  Goal: Skin Health and Integrity  Outcome: Ongoing, Progressing     Problem: Bariatric Environmental Safety  Goal: Safety Maintained with Care  Outcome: Ongoing, Progressing     Problem: Fall Injury Risk  Goal: Absence of Fall and Fall-Related Injury  Outcome: Ongoing, Progressing     Problem: Bleeding (Orthopaedic Fracture)  Goal: Absence of Bleeding  Outcome: Ongoing, Progressing     Problem: Embolism (Orthopaedic Fracture)  Goal: Absence of Embolism Signs and Symptoms  Outcome: Ongoing, Progressing     Problem: Delayed Union/Nonunion (Orthopaedic Fracture)  Goal: Fracture Stability  Outcome: Ongoing, Progressing     Problem: Functional Ability Impaired (Orthopaedic Fracture)  Goal: Optimal Functional Ability  Outcome: Ongoing, Progressing     Problem: Infection (Orthopaedic Fracture)  Goal:  Absence of Infection Signs and Symptoms  Outcome: Ongoing, Progressing     Problem: Neurovascular Compromise (Orthopaedic Fracture)  Goal: Effective Tissue Perfusion  Outcome: Ongoing, Progressing     Problem: Pain (Orthopaedic Fracture)  Goal: Acceptable Pain Control  Outcome: Ongoing, Progressing     Problem: Respiratory Compromise (Orthopaedic Fracture)  Goal: Effective Oxygenation and Ventilation  Outcome: Ongoing, Progressing

## 2024-01-05 NOTE — PLAN OF CARE
EVAL AND TX COMPLETED: facilitated bed mobility with min A, transfers with mod A. Ambulated 1-2 ft shuffled steps to chair, unable to maintain NWB to LLE. MD notified. Recommend high intensity therapy

## 2024-01-06 LAB
ANION GAP SERPL CALC-SCNC: 9 MMOL/L (ref 8–16)
BASOPHILS # BLD AUTO: 0.06 K/UL (ref 0–0.2)
BASOPHILS NFR BLD: 0.8 % (ref 0–1.9)
BUN SERPL-MCNC: 4 MG/DL (ref 6–20)
CALCIUM SERPL-MCNC: 8.5 MG/DL (ref 8.7–10.5)
CHLORIDE SERPL-SCNC: 104 MMOL/L (ref 95–110)
CO2 SERPL-SCNC: 31 MMOL/L (ref 23–29)
CREAT SERPL-MCNC: 0.7 MG/DL (ref 0.5–1.4)
DIFFERENTIAL METHOD BLD: ABNORMAL
EOSINOPHIL # BLD AUTO: 0.4 K/UL (ref 0–0.5)
EOSINOPHIL NFR BLD: 5.6 % (ref 0–8)
ERYTHROCYTE [DISTWIDTH] IN BLOOD BY AUTOMATED COUNT: 14.3 % (ref 11.5–14.5)
EST. GFR  (NO RACE VARIABLE): >60 ML/MIN/1.73 M^2
GLUCOSE SERPL-MCNC: 97 MG/DL (ref 70–110)
HCT VFR BLD AUTO: 30.2 % (ref 37–48.5)
HGB BLD-MCNC: 9.2 G/DL (ref 12–16)
IMM GRANULOCYTES # BLD AUTO: 0.02 K/UL (ref 0–0.04)
IMM GRANULOCYTES NFR BLD AUTO: 0.3 % (ref 0–0.5)
INFLUENZA A, MOLECULAR: NEGATIVE
INFLUENZA B, MOLECULAR: NEGATIVE
LYMPHOCYTES # BLD AUTO: 1.8 K/UL (ref 1–4.8)
LYMPHOCYTES NFR BLD: 25.6 % (ref 18–48)
MCH RBC QN AUTO: 21.9 PG (ref 27–31)
MCHC RBC AUTO-ENTMCNC: 30.5 G/DL (ref 32–36)
MCV RBC AUTO: 72 FL (ref 82–98)
MONOCYTES # BLD AUTO: 0.5 K/UL (ref 0.3–1)
MONOCYTES NFR BLD: 6.3 % (ref 4–15)
NEUTROPHILS # BLD AUTO: 4.4 K/UL (ref 1.8–7.7)
NEUTROPHILS NFR BLD: 61.4 % (ref 38–73)
NRBC BLD-RTO: 0 /100 WBC
PLATELET # BLD AUTO: 314 K/UL (ref 150–450)
PMV BLD AUTO: 9.4 FL (ref 9.2–12.9)
POTASSIUM SERPL-SCNC: 3.6 MMOL/L (ref 3.5–5.1)
RBC # BLD AUTO: 4.2 M/UL (ref 4–5.4)
SARS-COV-2 RDRP RESP QL NAA+PROBE: NEGATIVE
SODIUM SERPL-SCNC: 144 MMOL/L (ref 136–145)
SPECIMEN SOURCE: NORMAL
WBC # BLD AUTO: 7.15 K/UL (ref 3.9–12.7)

## 2024-01-06 PROCEDURE — 85025 COMPLETE CBC W/AUTO DIFF WBC: CPT

## 2024-01-06 PROCEDURE — 87502 INFLUENZA DNA AMP PROBE: CPT | Performed by: NURSE PRACTITIONER

## 2024-01-06 PROCEDURE — 63600175 PHARM REV CODE 636 W HCPCS: Performed by: HOSPITALIST

## 2024-01-06 PROCEDURE — 97530 THERAPEUTIC ACTIVITIES: CPT | Mod: CQ

## 2024-01-06 PROCEDURE — 25000003 PHARM REV CODE 250: Performed by: NURSE PRACTITIONER

## 2024-01-06 PROCEDURE — 11000001 HC ACUTE MED/SURG PRIVATE ROOM

## 2024-01-06 PROCEDURE — 80048 BASIC METABOLIC PNL TOTAL CA: CPT | Performed by: HOSPITALIST

## 2024-01-06 PROCEDURE — 25000003 PHARM REV CODE 250: Performed by: ORTHOPAEDIC SURGERY

## 2024-01-06 PROCEDURE — 25000003 PHARM REV CODE 250: Performed by: HOSPITALIST

## 2024-01-06 PROCEDURE — U0002 COVID-19 LAB TEST NON-CDC: HCPCS | Performed by: HOSPITALIST

## 2024-01-06 PROCEDURE — 63600175 PHARM REV CODE 636 W HCPCS: Performed by: NURSE PRACTITIONER

## 2024-01-06 RX ADMIN — HYDROMORPHONE HYDROCHLORIDE 1 MG: 2 INJECTION INTRAMUSCULAR; INTRAVENOUS; SUBCUTANEOUS at 05:01

## 2024-01-06 RX ADMIN — GABAPENTIN 300 MG: 300 CAPSULE ORAL at 08:01

## 2024-01-06 RX ADMIN — ONDANSETRON 4 MG: 2 INJECTION INTRAMUSCULAR; INTRAVENOUS at 06:01

## 2024-01-06 RX ADMIN — ENOXAPARIN SODIUM 40 MG: 100 INJECTION SUBCUTANEOUS at 08:01

## 2024-01-06 RX ADMIN — ENOXAPARIN SODIUM 40 MG: 100 INJECTION SUBCUTANEOUS at 09:01

## 2024-01-06 RX ADMIN — SODIUM CHLORIDE, POTASSIUM CHLORIDE, SODIUM LACTATE AND CALCIUM CHLORIDE: 600; 310; 30; 20 INJECTION, SOLUTION INTRAVENOUS at 12:01

## 2024-01-06 RX ADMIN — SODIUM CHLORIDE, POTASSIUM CHLORIDE, SODIUM LACTATE AND CALCIUM CHLORIDE: 600; 310; 30; 20 INJECTION, SOLUTION INTRAVENOUS at 11:01

## 2024-01-06 RX ADMIN — METHOCARBAMOL 750 MG: 750 TABLET ORAL at 08:01

## 2024-01-06 RX ADMIN — HYDROMORPHONE HYDROCHLORIDE 1 MG: 2 INJECTION INTRAMUSCULAR; INTRAVENOUS; SUBCUTANEOUS at 04:01

## 2024-01-06 RX ADMIN — GABAPENTIN 300 MG: 300 CAPSULE ORAL at 09:01

## 2024-01-06 RX ADMIN — OXYCODONE AND ACETAMINOPHEN 1 TABLET: 10; 325 TABLET ORAL at 12:01

## 2024-01-06 RX ADMIN — METHOCARBAMOL 750 MG: 750 TABLET ORAL at 09:01

## 2024-01-06 RX ADMIN — METHOCARBAMOL 750 MG: 750 TABLET ORAL at 02:01

## 2024-01-06 RX ADMIN — OXYCODONE AND ACETAMINOPHEN 1 TABLET: 10; 325 TABLET ORAL at 07:01

## 2024-01-06 RX ADMIN — HYDROMORPHONE HYDROCHLORIDE 1 MG: 2 INJECTION INTRAMUSCULAR; INTRAVENOUS; SUBCUTANEOUS at 10:01

## 2024-01-06 RX ADMIN — OXYCODONE AND ACETAMINOPHEN 1 TABLET: 10; 325 TABLET ORAL at 01:01

## 2024-01-06 RX ADMIN — HYDROCODONE BITARTRATE AND ACETAMINOPHEN 1 TABLET: 5; 325 TABLET ORAL at 08:01
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
## 2024-01-06 NOTE — PROGRESS NOTES
No acute events.  Pain controlled.    Vitals:    24 1147   BP: 134/68   Pulse: 91   Resp: 18   Temp: 98.2 °F (36.8 °C)     Temp (24hrs), Av.4 °F (36.9 °C), Min:97.9 °F (36.6 °C), Max:99 °F (37.2 °C)  ]    NAD  RLE moving toes well. SILT SP/DP/T    Labs:  Lab Results   Component Value Date/Time    HCT 35.4 (L) 2024 11:23 PM    K 3.8 2024 11:23 PM    CREATININE 0.7 2024 11:23 PM   ]    Plan:  NWB  Elevation  PT/OT  DVT ppx  Dr Henson to return tomorrow for repeat eval        Lindenkalyan Johnston M.D.  Orthopaedic Surgery  [facility]  [phone]

## 2024-01-06 NOTE — PLAN OF CARE
Bed locked, in lowest position. Call bell in reach. Purposeful rounding done. Midline placed, per MD order. Chart check complete. Will continue with plan of care.

## 2024-01-06 NOTE — PLAN OF CARE
01/06/24 0934   Post-Acute Status   Post-Acute Authorization Placement   Post-Acute Placement Status Referrals Sent   Discharge Plan   Discharge Plan A Rehab   Discharge Plan B Skilled Nursing Facility     [facility] rehab referrals submitted.

## 2024-01-06 NOTE — PLAN OF CARE
Pt requires MIN A for all mobility including assist to manage her R leg. She is limited by pain and additional weight of the Cam boot.. She is unable to ambulate at this time and is unable to maintain NWB on the R leg consistently.

## 2024-01-07 PROBLEM — D62 ACUTE BLOOD LOSS ANEMIA: Status: ACTIVE | Noted: 2024-01-07

## 2024-01-07 LAB
BASOPHILS # BLD AUTO: 0.03 K/UL (ref 0–0.2)
BASOPHILS NFR BLD: 0.4 % (ref 0–1.9)
DIFFERENTIAL METHOD BLD: ABNORMAL
EOSINOPHIL # BLD AUTO: 0.4 K/UL (ref 0–0.5)
EOSINOPHIL NFR BLD: 5.6 % (ref 0–8)
ERYTHROCYTE [DISTWIDTH] IN BLOOD BY AUTOMATED COUNT: 14 % (ref 11.5–14.5)
HCT VFR BLD AUTO: 29.3 % (ref 37–48.5)
HGB BLD-MCNC: 8.9 G/DL (ref 12–16)
IMM GRANULOCYTES # BLD AUTO: 0.02 K/UL (ref 0–0.04)
IMM GRANULOCYTES NFR BLD AUTO: 0.3 % (ref 0–0.5)
LYMPHOCYTES # BLD AUTO: 2 K/UL (ref 1–4.8)
LYMPHOCYTES NFR BLD: 28.2 % (ref 18–48)
MCH RBC QN AUTO: 21.3 PG (ref 27–31)
MCHC RBC AUTO-ENTMCNC: 30.4 G/DL (ref 32–36)
MCV RBC AUTO: 70 FL (ref 82–98)
MONOCYTES # BLD AUTO: 0.6 K/UL (ref 0.3–1)
MONOCYTES NFR BLD: 8.4 % (ref 4–15)
NEUTROPHILS # BLD AUTO: 4.1 K/UL (ref 1.8–7.7)
NEUTROPHILS NFR BLD: 57.1 % (ref 38–73)
NRBC BLD-RTO: 0 /100 WBC
PLATELET # BLD AUTO: 311 K/UL (ref 150–450)
PMV BLD AUTO: 8.6 FL (ref 9.2–12.9)
RBC # BLD AUTO: 4.17 M/UL (ref 4–5.4)
WBC # BLD AUTO: 7.17 K/UL (ref 3.9–12.7)

## 2024-01-07 PROCEDURE — 99024 POSTOP FOLLOW-UP VISIT: CPT | Mod: ,,, | Performed by: ORTHOPAEDIC SURGERY

## 2024-01-07 PROCEDURE — 11000001 HC ACUTE MED/SURG PRIVATE ROOM

## 2024-01-07 PROCEDURE — 25000003 PHARM REV CODE 250: Performed by: NURSE PRACTITIONER

## 2024-01-07 PROCEDURE — 63600175 PHARM REV CODE 636 W HCPCS: Performed by: HOSPITALIST

## 2024-01-07 PROCEDURE — 36406 VNPNXR<3YRS PHY/QHP OTHER VN: CPT

## 2024-01-07 PROCEDURE — 85025 COMPLETE CBC W/AUTO DIFF WBC: CPT

## 2024-01-07 PROCEDURE — 97530 THERAPEUTIC ACTIVITIES: CPT | Mod: CQ

## 2024-01-07 PROCEDURE — 25000003 PHARM REV CODE 250: Performed by: ORTHOPAEDIC SURGERY

## 2024-01-07 PROCEDURE — 25000003 PHARM REV CODE 250: Performed by: HOSPITALIST

## 2024-01-07 PROCEDURE — 63600175 PHARM REV CODE 636 W HCPCS: Performed by: NURSE PRACTITIONER

## 2024-01-07 RX ADMIN — GABAPENTIN 300 MG: 300 CAPSULE ORAL at 04:01

## 2024-01-07 RX ADMIN — ENOXAPARIN SODIUM 40 MG: 100 INJECTION SUBCUTANEOUS at 08:01

## 2024-01-07 RX ADMIN — HYDROMORPHONE HYDROCHLORIDE 1 MG: 2 INJECTION INTRAMUSCULAR; INTRAVENOUS; SUBCUTANEOUS at 01:01

## 2024-01-07 RX ADMIN — HYDROCODONE BITARTRATE AND ACETAMINOPHEN 1 TABLET: 5; 325 TABLET ORAL at 04:01

## 2024-01-07 RX ADMIN — GABAPENTIN 300 MG: 300 CAPSULE ORAL at 09:01

## 2024-01-07 RX ADMIN — GABAPENTIN 300 MG: 300 CAPSULE ORAL at 08:01

## 2024-01-07 RX ADMIN — METHOCARBAMOL 750 MG: 750 TABLET ORAL at 08:01

## 2024-01-07 RX ADMIN — OXYCODONE AND ACETAMINOPHEN 1 TABLET: 10; 325 TABLET ORAL at 08:01

## 2024-01-07 RX ADMIN — METHOCARBAMOL 750 MG: 750 TABLET ORAL at 04:01

## 2024-01-07 RX ADMIN — OXYCODONE AND ACETAMINOPHEN 1 TABLET: 10; 325 TABLET ORAL at 06:01
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
## 2024-01-07 NOTE — PLAN OF CARE
Discussed plan of care with patient   Safety and comfort precautions maintained this shift.   Call light and personal belongings within reach, bed in low position with bed wheels locked.  No s/s of acute distress.   Purposeful rounding continued this shift.  Pain levels are being  controlled per MD order. IVF infusing.  Vital signs continued per order.  Q2 repositioning to the right lower extremity for comfort  Chart and orders review completed.   Patient education about care completed.     Problem: Adult Inpatient Plan of Care  Goal: Plan of Care Review  Outcome: Ongoing, Progressing  Goal: Patient-Specific Goal (Individualized)  Outcome: Ongoing, Progressing  Flowsheets (Taken 1/7/2024 4950)  Anxieties, Fears or Concerns: none  Individualized Care Needs: none  Goal: Absence of Hospital-Acquired Illness or Injury  Outcome: Ongoing, Progressing  Goal: Optimal Comfort and Wellbeing  Outcome: Ongoing, Progressing  Goal: Readiness for Transition of Care  Outcome: Ongoing, Progressing     Problem: Skin Injury Risk Increased  Goal: Skin Health and Integrity  Outcome: Ongoing, Progressing     Problem: Bariatric Environmental Safety  Goal: Safety Maintained with Care  Outcome: Ongoing, Progressing     Problem: Fall Injury Risk  Goal: Absence of Fall and Fall-Related Injury  Outcome: Ongoing, Progressing     Problem: Bleeding (Orthopaedic Fracture)  Goal: Absence of Bleeding  Outcome: Ongoing, Progressing     Problem: Embolism (Orthopaedic Fracture)  Goal: Absence of Embolism Signs and Symptoms  Outcome: Ongoing, Progressing     Problem: Delayed Union/Nonunion (Orthopaedic Fracture)  Goal: Fracture Stability  Outcome: Ongoing, Progressing     Problem: Functional Ability Impaired (Orthopaedic Fracture)  Goal: Optimal Functional Ability  Outcome: Ongoing, Progressing     Problem: Infection (Orthopaedic Fracture)  Goal: Absence of Infection Signs and Symptoms  Outcome: Ongoing, Progressing     Problem: Neurovascular Compromise  (Orthopaedic Fracture)  Goal: Effective Tissue Perfusion  Outcome: Ongoing, Progressing     Problem: Pain (Orthopaedic Fracture)  Goal: Acceptable Pain Control  Outcome: Ongoing, Progressing     Problem: Respiratory Compromise (Orthopaedic Fracture)  Goal: Effective Oxygenation and Ventilation  Outcome: Ongoing, Progressing     Problem: Self-Care Deficit  Goal: Improved Ability to Complete Activities of Daily Living  Outcome: Ongoing, Progressing     Problem: Infection  Goal: Absence of Infection Signs and Symptoms  Outcome: Ongoing, Progressing

## 2024-01-07 NOTE — ASSESSMENT & PLAN NOTE
-s/p ORIF of tibial fracture w/ IMN,  1/4/23, Dr. Henson  -post op mgmt per ortho  -Continue pain control PRN PO/IV regimen, gabapentin  -DVT Prophylaxis: Lovenox  -NWB to RLE  -PT/OT recommended moderate intensity therapy  -SW consulted for assistance

## 2024-01-07 NOTE — SUBJECTIVE & OBJECTIVE
Interval History: awake, alert, lying in bed, c/o 9/10 pain. Said it was controlled until someone came and bumped it. NWB RLE, POD # 2 ORIF. Rehab pending.    Review of Systems  Objective:     Vital Signs (Most Recent):  Temp: 97.8 °F (36.6 °C) (01/06/24 1533)  Pulse: 92 (01/06/24 1533)  Resp: 18 (01/06/24 1704)  BP: (!) 123/59 (01/06/24 1533)  SpO2: 98 % (01/06/24 1533) Vital Signs (24h Range):  Temp:  [97.8 °F (36.6 °C)-99 °F (37.2 °C)] 97.8 °F (36.6 °C)  Pulse:  [] 92  Resp:  [18] 18  SpO2:  [92 %-99 %] 98 %  BP: (115-139)/(57-78) 123/59     Weight: 106.2 kg (234 lb 2.1 oz)  Body mass index is 41.47 kg/m².    Intake/Output Summary (Last 24 hours) at 1/6/2024 1819  Last data filed at 1/6/2024 1742  Gross per 24 hour   Intake 660 ml   Output 5050 ml   Net -4390 ml         Physical Exam  Vitals and nursing note reviewed.   Constitutional:       Appearance: She is obese.   Cardiovascular:      Rate and Rhythm: Normal rate and regular rhythm.      Heart sounds: No murmur heard.  Pulmonary:      Effort: Pulmonary effort is normal.      Breath sounds: Normal breath sounds.   Abdominal:      General: Bowel sounds are normal.      Palpations: Abdomen is soft.   Musculoskeletal:         General: Normal range of motion.      Comments: RLE ACE wrap with long boot in place C/D/I   Skin:     General: Skin is warm and dry.   Neurological:      General: No focal deficit present.      Mental Status: She is alert and oriented to person, place, and time.   Psychiatric:         Mood and Affect: Mood normal.         Behavior: Behavior normal.             Significant Labs: All pertinent labs within the past 24 hours have been reviewed.  CBC:   Recent Labs   Lab 01/06/24  1011   WBC 7.15   HGB 9.2*   HCT 30.2*        CMP:   Recent Labs   Lab 01/06/24  1011      K 3.6      CO2 31*   GLU 97   BUN 4*   CREATININE 0.7   CALCIUM 8.5*   ANIONGAP 9       Significant Imaging: I have reviewed all pertinent imaging  results/findings within the past 24 hours.

## 2024-01-07 NOTE — PT/OT/SLP PROGRESS
"Physical Therapy  Treatment    Harrison Estevez   MRN: [id number]   Admitting Diagnosis: Tibia/fibula fracture, right, closed, initial encounter    PT Received On: 01/07/24  PT Start Time: 1000     PT Stop Time: 1025    PT Total Time (min): 25 min       Billable Minutes:  Therapeutic Activity 25    Treatment Type: Treatment  PT/PTA: PTA     Number of PTA visits since last PT visit: 2       General Precautions: Standard, fall  Orthopedic Precautions: RLE non weight bearing (NWB with CAM boot)  Braces:  (R CAM boot)  Respiratory Status: Room air    Spiritual, Cultural Beliefs, Denominational Practices, Values that Affect Care: no    Subjective:  Communicated with nurse Whittington and completed Epic chart review prior to session. Pt agreed to session.    Pain/Comfort  Pain Rating 1: 8/10  Location - Side 1: Right  Location - Orientation 1: lower  Location 1: leg  Pain Addressed 1: Pre-medicate for activity, Reposition, Distraction    Objective:   Patient found with: telemetry, peripheral IV    Reminded pt of NWB precautions on RLE    Supine > Sit: Mod A, assistance needed for RLE  Scoot towards EOB: Min A assistance needed for RLE  STS from EOB: Min A x2 with RW  Stand > Sit: Min A with RW    Pt able to take 5 steps to bedside chair with Min A with RW demonstrates difficulty with NWB precautions. Pt left sitting up in chair with 2 cushions and pillow under RLE.    Treatment and Education:  Educated pt on NWB precautions on RLE. Educated pt on importance of increasing time spent OOB. Educated pt on elevation to prevent swelling. Educated pt on performing HEP ( LAQ, HIP FLEX, HIP ABD/ADD) throughout the day. Educated pt on "call don't fall" policy and encouraged to call for assistance for all OOB needs. Pt agreeable to safety precautions.     AM-PAC 6 CLICK MOBILITY  How much help from another person does this patient currently need?   1 = Unable, Total/Dependent Assistance  2 = A lot, Maximum/Moderate Assistance  3 = A little, " Minimum/Contact Guard/Supervision  4 = None, Modified [place]/Independent    Turning over in bed (including adjusting bedclothes, sheets and blankets)?: 3  Sitting down on and standing up from a chair with arms (e.g., wheelchair, bedside commode, etc.): 3  Moving from lying on back to sitting on the side of the bed?: 3  Moving to and from a bed to a chair (including a wheelchair)?: 2  Need to walk in hospital room?: 2  Climbing 3-5 steps with a railing?: 1  Basic Mobility Total Score: 14    AM-PAC Raw Score CMS G-Code Modifier Level of Impairment Assistance   6 % Total / Unable   7 - 9 CM 80 - 100% Maximal Assist   10 - 14 CL 60 - 80% Moderate Assist   15 - 19 CK 40 - 60% Moderate Assist   20 - 22 CJ 20 - 40% Minimal Assist   23 CI 1-20% SBA / CGA   24 CH 0% Independent/ Mod I     Patient left up in chair with 2 cushions and a pillow under RLE with all lines intact, call button in reach, nurse notified, and CNA present.    Assessment:  Harrison Estevez is a 48 y.o. female with a medical diagnosis of Tibia/fibula fracture, right, closed, initial encounter and presents with the following functional limitations listed below. Pt will continue to benefit from skilled PT to address these limitations in order to reach maximum level of function.     Rehab identified problem list/impairments: weakness, impaired self care skills, impaired balance, decreased coordination, decreased safety awareness, decreased ROM, impaired joint extensibility, impaired endurance, impaired functional mobility, pain, gait instability, decreased lower extremity function, orthopedic precautions    Rehab potential is good.    Activity tolerance: Good    Discharge recommendations: Moderate Intensity Therapy      Barriers to discharge:      Equipment recommendations: to be determined by next level of care     GOALS:   Multidisciplinary Problems       Physical Therapy Goals          Problem: Physical Therapy    Goal Priority Disciplines  Outcome Goal Variances Interventions   Physical Therapy Goal     PT, PT/OT      Description: Pt will perform bed mobility with SPV in order to participate in EOB activity.  Pt will perform transfers with SPV in order to participate in OOB activity.  Pt will ambulate 50 ft CGA with LRAD in order to participate in daily tasks.                        PLAN:    Patient to be seen 3 x/week to address the above listed problems via therapeutic activities, gait training, therapeutic exercises  Plan of Care expires: 01/09/24  Plan of Care reviewed with: patient         01/07/2024
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
## 2024-01-07 NOTE — SUBJECTIVE & OBJECTIVE
Interval History: awake, alert, lying in bed, c/o 9/10 pain. Said it was controlled until someone came and bumped it. NWB RLE, POD # 3 ORIF. Hgb 7.2 today, give IV iron and monitor. Rehab vs OP therapy pending.     Review of Systems  Objective:     Vital Signs (Most Recent):  Temp: 98.8 °F (37.1 °C) (01/07/24 1623)  Pulse: 97 (01/07/24 1623)  Resp: 18 (01/07/24 1623)  BP: 128/72 (01/07/24 1623)  SpO2: 100 % (01/07/24 1623) Vital Signs (24h Range):  Temp:  [97.6 °F (36.4 °C)-98.8 °F (37.1 °C)] 98.8 °F (37.1 °C)  Pulse:  [77-98] 97  Resp:  [18] 18  SpO2:  [92 %-100 %] 100 %  BP: (104-145)/(55-78) 128/72     Weight: 106.2 kg (234 lb 2.1 oz)  Body mass index is 41.47 kg/m².    Intake/Output Summary (Last 24 hours) at 1/7/2024 1651  Last data filed at 1/7/2024 0640  Gross per 24 hour   Intake 1030 ml   Output 2700 ml   Net -1670 ml           Physical Exam  Vitals and nursing note reviewed.   Constitutional:       Appearance: She is obese.   Cardiovascular:      Rate and Rhythm: Normal rate and regular rhythm.      Heart sounds: No murmur heard.  Pulmonary:      Effort: Pulmonary effort is normal.      Breath sounds: Normal breath sounds.   Abdominal:      General: Bowel sounds are normal.      Palpations: Abdomen is soft.   Musculoskeletal:         General: Normal range of motion.      Comments: RLE ACE wrap with long boot in place C/D/I   Skin:     General: Skin is warm and dry.   Neurological:      General: No focal deficit present.      Mental Status: She is alert and oriented to person, place, and time.   Psychiatric:         Mood and Affect: Mood normal.         Behavior: Behavior normal.             Significant Labs: All pertinent labs within the past 24 hours have been reviewed.  CBC:   Recent Labs   Lab 01/06/24  1011 01/07/24  0512   WBC 7.15 7.17   HGB 9.2* 8.9*   HCT 30.2* 29.3*    311       CMP:   Recent Labs   Lab 01/06/24  1011      K 3.6      CO2 31*   GLU 97   BUN 4*   CREATININE 0.7    CALCIUM 8.5*   ANIONGAP 9         Significant Imaging: I have reviewed all pertinent imaging results/findings within the past 24 hours.
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
## 2024-01-07 NOTE — PLAN OF CARE
Bed locked, in lowest position. Call bell in reach. Purposeful rounding done. Chart check complete. Will continue with plan of care.

## 2024-01-07 NOTE — PROGRESS NOTES
3 Days Post-Op       SUBJECTIVE:   Patient reports:  Pain improving.      Neurontin helping the nerve pain but also making her sleepy.    Wondering if there is some form of intensive outpatient physical therapy because she is worried about her 12y/o Alethea.     PT/Nursing notes reviewed.       Physical Exam:   Vital Signs   Wt Readings from Last 1 Encounters:   01/05/24 106.2 kg (234 lb 2.1 oz)     Temp Readings from Last 1 Encounters:   01/07/24 98.6 °F (37 °C) (Oral)     BP Readings from Last 1 Encounters:   01/07/24 129/61     Pulse Readings from Last 1 Encounters:   01/07/24 82       Body mass index is 41.47 kg/m².    General Appearance:   NAD, well appearing, cooperative    Neurologic:  Alert and oriented x3    Pysch:  Age appropriate    STATION:   Seated in chair    Musculoskeletal:     Right   leg:  Dressing in place and clean/dry.  Foot has plantar flexed in boot.  No erythema. Swelling improving.  ROM of knee fair.  Able to dorsiflex boot into proper dorsiflexion.  Sensation to light touch now normal throughout foot/toes.  EHL firing.  Lesser toes extending.  Toes flexion now 4/5.  Distal neurovascular status intact.          Assessment:        Musculoskeletal:     Right leg: Dressing/boot in place.  Boot straps loosend.  Swelling improved.  Calf soft.  Sensation to light touch now normal in 1st web space, dorsal foot, plantar foot although plantar foot very sensitive.   Able to flex all toes.  Can initiate both lesser and great toes. Cap refill <2s.               DISCUSSION:   Patient's symptoms, imaging, diagnosis and prognosis reviewed and discussed.  Discussed hhealing progression. Discussed  case with attending hospitalist.    Patient given an opportunity to ask questions.  When her questions, were answered, the following plan was adopted.      Plan:      Weight bearing:    Right   leg Non-Weight Bearing   Ice/elevate right leg to help decrease pain and swelling  Make sure foot is properly seated in  boot  Continue neurontin on DC  Doubt she will tolerate dose increase  Follow-up with ortho has been arranged  OK for DC when arrangements made  Dr. Santiago on this week             Brenda Henson, DO, CAQSM, [facility]  Orthopaedic Surgeon

## 2024-01-07 NOTE — ASSESSMENT & PLAN NOTE
-s/p ORIF of tibial fracture w/ IMN,  1/4/23, Dr. Henson  -post op mgmt per ortho  -Continue pain control PRN PO/IV regimen, gabapentin  -DVT Prophylaxis: Lovenox  -NWB to RLE  -PT/OT recommended moderate intensity therapy  -SW consulted for d/c needs

## 2024-01-07 NOTE — ASSESSMENT & PLAN NOTE
Patient's anemia is currently controlled. Has not received any PRBCs to date. Etiology likely d/t acute blood loss which was from surgery  Current CBC reviewed-   Lab Results   Component Value Date    HGB 8.9 (L) 01/07/2024    HCT 29.3 (L) 01/07/2024     Monitor serial CBC and transfuse if patient becomes hemodynamically unstable, symptomatic or H/H drops below 7/21.   38

## 2024-01-07 NOTE — PLAN OF CARE
"Met with patient to discuss inpatient rehab options.  Patient expressed desire to pursue outpatient program where she can go daily for extensive therapy and return home at night.  Reports "my friend went to one and they picked her up every day to go."  CM offered to explore  "

## 2024-01-08 LAB
BASOPHILS # BLD AUTO: 0.03 K/UL (ref 0–0.2)
BASOPHILS NFR BLD: 0.4 % (ref 0–1.9)
DIFFERENTIAL METHOD BLD: ABNORMAL
EOSINOPHIL # BLD AUTO: 0.4 K/UL (ref 0–0.5)
EOSINOPHIL NFR BLD: 5.1 % (ref 0–8)
ERYTHROCYTE [DISTWIDTH] IN BLOOD BY AUTOMATED COUNT: 13.7 % (ref 11.5–14.5)
HCT VFR BLD AUTO: 29.6 % (ref 37–48.5)
HGB BLD-MCNC: 9.1 G/DL (ref 12–16)
IMM GRANULOCYTES # BLD AUTO: 0.01 K/UL (ref 0–0.04)
IMM GRANULOCYTES NFR BLD AUTO: 0.1 % (ref 0–0.5)
LYMPHOCYTES # BLD AUTO: 1.8 K/UL (ref 1–4.8)
LYMPHOCYTES NFR BLD: 25.4 % (ref 18–48)
MCH RBC QN AUTO: 21.6 PG (ref 27–31)
MCHC RBC AUTO-ENTMCNC: 30.7 G/DL (ref 32–36)
MCV RBC AUTO: 70 FL (ref 82–98)
MONOCYTES # BLD AUTO: 0.5 K/UL (ref 0.3–1)
MONOCYTES NFR BLD: 6.8 % (ref 4–15)
NEUTROPHILS # BLD AUTO: 4.5 K/UL (ref 1.8–7.7)
NEUTROPHILS NFR BLD: 62.2 % (ref 38–73)
NRBC BLD-RTO: 0 /100 WBC
PLATELET # BLD AUTO: 350 K/UL (ref 150–450)
PMV BLD AUTO: 9 FL (ref 9.2–12.9)
RBC # BLD AUTO: 4.22 M/UL (ref 4–5.4)
WBC # BLD AUTO: 7.21 K/UL (ref 3.9–12.7)

## 2024-01-08 PROCEDURE — 36406 VNPNXR<3YRS PHY/QHP OTHER VN: CPT

## 2024-01-08 PROCEDURE — 99024 POSTOP FOLLOW-UP VISIT: CPT | Mod: ,,, | Performed by: ORTHOPAEDIC SURGERY

## 2024-01-08 PROCEDURE — 97110 THERAPEUTIC EXERCISES: CPT

## 2024-01-08 PROCEDURE — 94760 N-INVAS EAR/PLS OXIMETRY 1: CPT

## 2024-01-08 PROCEDURE — 25000003 PHARM REV CODE 250: Performed by: ORTHOPAEDIC SURGERY

## 2024-01-08 PROCEDURE — 25000003 PHARM REV CODE 250: Performed by: NURSE PRACTITIONER

## 2024-01-08 PROCEDURE — 97530 THERAPEUTIC ACTIVITIES: CPT

## 2024-01-08 PROCEDURE — 25000003 PHARM REV CODE 250: Performed by: HOSPITALIST

## 2024-01-08 PROCEDURE — 11000001 HC ACUTE MED/SURG PRIVATE ROOM

## 2024-01-08 PROCEDURE — 94761 N-INVAS EAR/PLS OXIMETRY MLT: CPT

## 2024-01-08 PROCEDURE — 63600175 PHARM REV CODE 636 W HCPCS: Performed by: NURSE PRACTITIONER

## 2024-01-08 PROCEDURE — 85025 COMPLETE CBC W/AUTO DIFF WBC: CPT

## 2024-01-08 PROCEDURE — 63600175 PHARM REV CODE 636 W HCPCS: Performed by: HOSPITALIST

## 2024-01-08 RX ADMIN — HYDROCODONE BITARTRATE AND ACETAMINOPHEN 1 TABLET: 5; 325 TABLET ORAL at 12:01

## 2024-01-08 RX ADMIN — METHOCARBAMOL 750 MG: 750 TABLET ORAL at 03:01

## 2024-01-08 RX ADMIN — OXYCODONE AND ACETAMINOPHEN 1 TABLET: 10; 325 TABLET ORAL at 04:01

## 2024-01-08 RX ADMIN — ENOXAPARIN SODIUM 40 MG: 100 INJECTION SUBCUTANEOUS at 09:01

## 2024-01-08 RX ADMIN — HYDROCODONE BITARTRATE AND ACETAMINOPHEN 1 TABLET: 5; 325 TABLET ORAL at 08:01

## 2024-01-08 RX ADMIN — GABAPENTIN 300 MG: 300 CAPSULE ORAL at 08:01

## 2024-01-08 RX ADMIN — GABAPENTIN 300 MG: 300 CAPSULE ORAL at 03:01

## 2024-01-08 RX ADMIN — METHOCARBAMOL 750 MG: 750 TABLET ORAL at 08:01

## 2024-01-08 RX ADMIN — OXYCODONE AND ACETAMINOPHEN 1 TABLET: 10; 325 TABLET ORAL at 09:01

## 2024-01-08 RX ADMIN — METHOCARBAMOL 750 MG: 750 TABLET ORAL at 09:01

## 2024-01-08 RX ADMIN — GABAPENTIN 300 MG: 300 CAPSULE ORAL at 09:01

## 2024-01-08 RX ADMIN — HYDROMORPHONE HYDROCHLORIDE 1 MG: 2 INJECTION INTRAMUSCULAR; INTRAVENOUS; SUBCUTANEOUS at 01:01

## 2024-01-08 RX ADMIN — ENOXAPARIN SODIUM 40 MG: 100 INJECTION SUBCUTANEOUS at 08:01
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
## 2024-01-08 NOTE — PLAN OF CARE
Patient is stable. No signs or symptoms of acute distress. Pain management with pain meds. Incision, CDI. Meds given as ordered. Bed in lowest position, call light in reach. Chart orders reviewed.

## 2024-01-08 NOTE — PLAN OF CARE
RINKU spoke with pt who stated she now wants to go to inpatient rehab. Pt stated she has a plan regarding where her daughter will stay. Pt is able to go to inpatient rehab with [facility]. RINKU informed Perla at [facility]. Perla stated they will submit for auth. Pending [facility] auth.

## 2024-01-08 NOTE — PT/OT/SLP PROGRESS
Physical Therapy  Treatment    Harrison Estevez   MRN: [id number]   Admitting Diagnosis: Tibia/fibula fracture, right, closed, initial encounter    PT Received On: 01/08/24  PT Start Time: 1035     PT Stop Time: 1100    PT Total Time (min): 25 min       Billable Minutes:  Therapeutic Activity 15 and Therapeutic Exercise 10    Treatment Type: Treatment  PT/PTA: PT     Number of PTA visits since last PT visit: 0       General Precautions: Standard, fall  Orthopedic Precautions: RLE non weight bearing  Braces:  (WALKING BOOT)  Respiratory Status: Room air    Spiritual, Cultural Beliefs, Sabianism Practices, Values that Affect Care: no    Subjective:  Communicated with NURSE CORA AND EPIC CHART REVIEW  prior to session.   PT AGREED TO TX     Pain/Comfort  Pain Rating 1: 7/10  Location - Side 1: Right  Location 1: leg  Pain Rating Post-Intervention 1: 7/10    Objective:   Patient found with: peripheral IV, telemetry    Functional Mobility:  PT MET IN RM SUP IN BED. PT SUP.SIT EOB WITH MIN A OF R LE. PT SCOOTED TO EOB WITH MIN A. GT. BELT AND  SOCKS DONNED PRIOR TO OOB MOBILITY. PT RE-EDUCATED ON NWB R LE. PT STOOD WITH RW AND NWB R LE FOR PIVOT T/F TO CHAIR WITH MIN A. PT SEATED IN CHAIR FOR REST.     Treatment and Education:  PT COMPLETED L LE MIP, TKE, AP AND GLUT SETS AND R LE TKE ( AAROM). PT LEFT SEATED IN CHAIR WITH R LE ELEVATED AND LEFT WITH ALL NEEDS MET.      AM-PAC 6 CLICK MOBILITY  How much help from another person does this patient currently need?   1 = Unable, Total/Dependent Assistance  2 = A lot, Maximum/Moderate Assistance  3 = A little, Minimum/Contact Guard/Supervision  4 = None, Modified [place]/Independent    Turning over in bed (including adjusting bedclothes, sheets and blankets)?: 3  Sitting down on and standing up from a chair with arms (e.g., wheelchair, bedside commode, etc.): 3  Moving from lying on back to sitting on the side of the bed?: 3  Moving to and from a bed to a chair  (including a wheelchair)?: 3  Need to walk in hospital room?: 1  Climbing 3-5 steps with a railing?: 1  Basic Mobility Total Score: 14    AM-PAC Raw Score CMS G-Code Modifier Level of Impairment Assistance   6 % Total / Unable   7 - 9 CM 80 - 100% Maximal Assist   10 - 14 CL 60 - 80% Moderate Assist   15 - 19 CK 40 - 60% Moderate Assist   20 - 22 CJ 20 - 40% Minimal Assist   23 CI 1-20% SBA / CGA   24 CH 0% Independent/ Mod I     Patient left up in chair with call button in reach.    Assessment:  PT MAEVE TX WITH INC PAIN AND WAS COMPLIANT WITH NWB R LE.     Rehab identified problem list/impairments: weakness, impaired endurance, impaired functional mobility, gait instability, impaired balance, impaired self care skills, pain, edema, decreased lower extremity function, decreased ROM    Rehab potential is good.    Activity tolerance: Fair    Discharge recommendations: Low Intensity Therapy      Barriers to discharge:      Equipment recommendations: wheelchair, walker, rolling, bedside commode     GOALS:   Multidisciplinary Problems       Physical Therapy Goals          Problem: Physical Therapy    Goal Priority Disciplines Outcome Goal Variances Interventions   Physical Therapy Goal     PT, PT/OT Ongoing, Progressing     Description: Pt will perform bed mobility with SPV in order to participate in EOB activity.  Pt will perform transfers with SPV in order to participate in OOB activity.  Pt will ambulate 50 ft CGA with LRAD in order to participate in daily tasks.                        PLAN:    Patient to be seen 3 x/week to address the above listed problems via therapeutic activities, therapeutic exercises  Plan of Care expires: 01/15/24  Plan of Care reviewed with: patient         01/08/2024

## 2024-01-08 NOTE — PLAN OF CARE
Discussed plan of care with patient and patient, verbalized understanding.  Patient remains free from injury.  Safety and comfort precautions maintained this shift.   Call light and personal belongings within reach, bed in low position with bed wheels locked.  No s/s of acute distress.   Purposeful rounding continued this shift.  Pain levels are being  controlled per MD order. IVF infusing.  Regular diet orders continued,   Vital signs continued per order.  Q2 repositioning to the right lower extremity  Chart and orders review completed.   Patient education about care completed. +  Problem: Adult Inpatient Plan of Care  Goal: Plan of Care Review  Outcome: Ongoing, Progressing  Goal: Patient-Specific Goal (Individualized)  Outcome: Ongoing, Progressing  Flowsheets (Taken 1/8/2024 0104)  Anxieties, Fears or Concerns: none  Individualized Care Needs: none  Goal: Absence of Hospital-Acquired Illness or Injury  Outcome: Ongoing, Progressing  Goal: Optimal Comfort and Wellbeing  Outcome: Ongoing, Progressing  Goal: Readiness for Transition of Care  Outcome: Ongoing, Progressing     Problem: Skin Injury Risk Increased  Goal: Skin Health and Integrity  Outcome: Ongoing, Progressing     Problem: Bariatric Environmental Safety  Goal: Safety Maintained with Care  Outcome: Ongoing, Progressing     Problem: Fall Injury Risk  Goal: Absence of Fall and Fall-Related Injury  Outcome: Ongoing, Progressing     Problem: Bleeding (Orthopaedic Fracture)  Goal: Absence of Bleeding  Outcome: Ongoing, Progressing     Problem: Embolism (Orthopaedic Fracture)  Goal: Absence of Embolism Signs and Symptoms  Outcome: Ongoing, Progressing     Problem: Delayed Union/Nonunion (Orthopaedic Fracture)  Goal: Fracture Stability  Outcome: Ongoing, Progressing     Problem: Functional Ability Impaired (Orthopaedic Fracture)  Goal: Optimal Functional Ability  Outcome: Ongoing, Progressing     Problem: Infection (Orthopaedic Fracture)  Goal: Absence of  Infection Signs and Symptoms  Outcome: Ongoing, Progressing     Problem: Neurovascular Compromise (Orthopaedic Fracture)  Goal: Effective Tissue Perfusion  Outcome: Ongoing, Progressing     Problem: Pain (Orthopaedic Fracture)  Goal: Acceptable Pain Control  Outcome: Ongoing, Progressing     Problem: Respiratory Compromise (Orthopaedic Fracture)  Goal: Effective Oxygenation and Ventilation  Outcome: Ongoing, Progressing     Problem: Self-Care Deficit  Goal: Improved Ability to Complete Activities of Daily Living  Outcome: Ongoing, Progressing     Problem: Infection  Goal: Absence of Infection Signs and Symptoms  Outcome: Ongoing, Progressing

## 2024-01-08 NOTE — SUBJECTIVE & OBJECTIVE
Review of Systems   All other systems reviewed and are negative.    Objective:     Vital Signs (Most Recent):  Temp: 98.1 °F (36.7 °C) (01/08/24 1519)  Pulse: 97 (01/08/24 1519)  Resp: 18 (01/08/24 1519)  BP: (!) 138/97 (01/08/24 1519)  SpO2: 97 % (01/08/24 1519) Vital Signs (24h Range):  Temp:  [98 °F (36.7 °C)-98.8 °F (37.1 °C)] 98.1 °F (36.7 °C)  Pulse:  [] 97  Resp:  [16-18] 18  SpO2:  [90 %-100 %] 97 %  BP: (113-171)/(61-97) 138/97     Weight: 106.2 kg (234 lb 2.1 oz)  Body mass index is 41.47 kg/m².    Intake/Output Summary (Last 24 hours) at 1/8/2024 1547  Last data filed at 1/8/2024 0844  Gross per 24 hour   Intake 490 ml   Output 3950 ml   Net -3460 ml         Physical Exam  Vitals and nursing note reviewed.   Constitutional:       General: She is not in acute distress.     Appearance: Normal appearance. She is normal weight.   Cardiovascular:      Rate and Rhythm: Normal rate and regular rhythm.      Heart sounds: No murmur heard.  Pulmonary:      Effort: Pulmonary effort is normal. No respiratory distress.      Breath sounds: No wheezing.   Musculoskeletal:      Comments: RLE in boot   Neurological:      General: No focal deficit present.      Mental Status: She is alert and oriented to person, place, and time.   Psychiatric:         Mood and Affect: Mood normal.         Behavior: Behavior normal.             Significant Labs: All pertinent labs within the past 24 hours have been reviewed.  Recent Lab Results         01/08/24  0417        Baso # 0.03       Basophil % 0.4       Differential Method Automated       Eos # 0.4       Eosinophil % 5.1       Gran # (ANC) 4.5       Gran % 62.2       Hematocrit 29.6       Hemoglobin 9.1       Immature Grans (Abs) 0.01  Comment: Mild elevation in immature granulocytes is non specific and   can be seen in a variety of conditions including stress response,   acute inflammation, trauma and pregnancy. Correlation with other   laboratory and clinical findings is  essential.         Immature Granulocytes 0.1       Lymph # 1.8       Lymph % 25.4       MCH 21.6       MCHC 30.7       MCV 70       Mono # 0.5       Mono % 6.8       MPV 9.0       nRBC 0       Platelet Count 350       RBC 4.22       RDW 13.7       WBC 7.21               Significant Imaging: I have reviewed all pertinent imaging results/findings within the past 24 hours.    X-Ray Tibia Fibula 2 View Right   Final Result      Satisfactory postoperative appearance         Electronically signed by: Chintna Tate   Date:    01/04/2024   Time:    16:30      SURG FL Surgery Fluoro Usage   Final Result      X-Ray Tibia Fibula 2 View Right   Final Result      As above.         Electronically signed by: Harjinder Akhtar   Date:    01/04/2024   Time:    15:32      CT Knee Without Contrast Right   Final Result      Acute fractures, as above.         Electronically signed by: Sandy Cloud   Date:    01/03/2024   Time:    01:28      CT Cervical Spine Without Contrast   Final Result      No acute findings.         Electronically signed by: Sandy Cloud   Date:    01/03/2024   Time:    01:32      X-Ray Chest AP Portable   Final Result      No acute findings.         Electronically signed by: Sandy Cloud   Date:    01/03/2024   Time:    00:40      X-Ray Hip 2 or 3 views Right (with Pelvis when performed)   Final Result      No acute abnormality.         Electronically signed by: Chintan Tate   Date:    01/02/2024   Time:    23:26      X-Ray Knee 1 or 2 View Right   Final Result      As above         Electronically signed by: Chintan Tate   Date:    01/02/2024   Time:    22:58      X-Ray Ankle 2 View Right   Final Result      As above         Electronically signed by: Chintan Tate   Date:    01/02/2024   Time:    23:00      X-Ray Tibia Fibula 2 View Right   Final Result      Acute fractures, as above.         Electronically signed by: Sandy Cloud   Date:    01/02/2024   Time:    23:18      CT  Maxillofacial Without Contrast   Final Result      No acute fracture or dislocation of the facial bones as far seen         Electronically signed by: Chintan Tate   Date:    01/02/2024   Time:    22:40

## 2024-01-08 NOTE — PLAN OF CARE
[facility] - Med Surg  Discharge Reassessment    Primary Care Provider: No, Primary Doctor    Expected Discharge Date:     Reassessment (most recent)       Discharge Reassessment - 01/08/24 0873          Discharge Reassessment    Assessment Type Discharge Planning Reassessment     Did the patient's condition or plan change since previous assessment? No     Discharge Plan discussed with: Patient     Communicated GUSTAVO with patient/caregiver Date not available/Unable to determine     Discharge Plan A Rehab
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
## 2024-01-08 NOTE — PT/OT/SLP PROGRESS
Occupational Therapy      Patient Name:  Harrison Estevez   MRN:  [id number]    Patient not seen today secondary to Other (Comment) (Pt just finished PT session.). Will follow-up at next opportunity.    1/8/2024

## 2024-01-09 PROBLEM — D62 ACUTE BLOOD LOSS ANEMIA: Status: RESOLVED | Noted: 2024-01-07 | Resolved: 2024-01-09

## 2024-01-09 LAB
BASOPHILS # BLD AUTO: 0.03 K/UL (ref 0–0.2)
BASOPHILS NFR BLD: 0.5 % (ref 0–1.9)
DIFFERENTIAL METHOD BLD: ABNORMAL
EOSINOPHIL # BLD AUTO: 0.2 K/UL (ref 0–0.5)
EOSINOPHIL NFR BLD: 4 % (ref 0–8)
ERYTHROCYTE [DISTWIDTH] IN BLOOD BY AUTOMATED COUNT: 13.6 % (ref 11.5–14.5)
HCT VFR BLD AUTO: 25.2 % (ref 37–48.5)
HGB BLD-MCNC: 7.9 G/DL (ref 12–16)
IMM GRANULOCYTES # BLD AUTO: 0.02 K/UL (ref 0–0.04)
IMM GRANULOCYTES NFR BLD AUTO: 0.4 % (ref 0–0.5)
LYMPHOCYTES # BLD AUTO: 1.4 K/UL (ref 1–4.8)
LYMPHOCYTES NFR BLD: 25.7 % (ref 18–48)
MCH RBC QN AUTO: 22.1 PG (ref 27–31)
MCHC RBC AUTO-ENTMCNC: 31.3 G/DL (ref 32–36)
MCV RBC AUTO: 71 FL (ref 82–98)
MONOCYTES # BLD AUTO: 0.5 K/UL (ref 0.3–1)
MONOCYTES NFR BLD: 9.1 % (ref 4–15)
NEUTROPHILS # BLD AUTO: 3.3 K/UL (ref 1.8–7.7)
NEUTROPHILS NFR BLD: 60.3 % (ref 38–73)
NRBC BLD-RTO: 0 /100 WBC
PLATELET # BLD AUTO: 190 K/UL (ref 150–450)
PLATELET BLD QL SMEAR: ABNORMAL
PMV BLD AUTO: 9.2 FL (ref 9.2–12.9)
POCT GLUCOSE: 117 MG/DL (ref 70–110)
RBC # BLD AUTO: 3.57 M/UL (ref 4–5.4)
WBC # BLD AUTO: 5.52 K/UL (ref 3.9–12.7)

## 2024-01-09 PROCEDURE — 63600175 PHARM REV CODE 636 W HCPCS: Performed by: HOSPITALIST

## 2024-01-09 PROCEDURE — 63600175 PHARM REV CODE 636 W HCPCS: Performed by: NURSE PRACTITIONER

## 2024-01-09 PROCEDURE — 25000003 PHARM REV CODE 250: Performed by: NURSE PRACTITIONER

## 2024-01-09 PROCEDURE — 97530 THERAPEUTIC ACTIVITIES: CPT

## 2024-01-09 PROCEDURE — 11000001 HC ACUTE MED/SURG PRIVATE ROOM

## 2024-01-09 PROCEDURE — 36406 VNPNXR<3YRS PHY/QHP OTHER VN: CPT

## 2024-01-09 PROCEDURE — 25000003 PHARM REV CODE 250: Performed by: ORTHOPAEDIC SURGERY

## 2024-01-09 PROCEDURE — 85025 COMPLETE CBC W/AUTO DIFF WBC: CPT

## 2024-01-09 PROCEDURE — 94761 N-INVAS EAR/PLS OXIMETRY MLT: CPT

## 2024-01-09 PROCEDURE — 99024 POSTOP FOLLOW-UP VISIT: CPT | Mod: ,,, | Performed by: ORTHOPAEDIC SURGERY

## 2024-01-09 PROCEDURE — 97110 THERAPEUTIC EXERCISES: CPT

## 2024-01-09 RX ORDER — POLYETHYLENE GLYCOL 3350 17 G/17G
17 POWDER, FOR SOLUTION ORAL DAILY PRN
Refills: 0
Start: 2024-01-09

## 2024-01-09 RX ORDER — ENOXAPARIN SODIUM 100 MG/ML
40 INJECTION SUBCUTANEOUS EVERY 24 HOURS
Qty: 9.6 ML | Refills: 0
Start: 2024-01-10 | End: 2024-01-24 | Stop reason: SDUPTHER

## 2024-01-09 RX ORDER — ENOXAPARIN SODIUM 100 MG/ML
40 INJECTION SUBCUTANEOUS EVERY 24 HOURS
Status: DISCONTINUED | OUTPATIENT
Start: 2024-01-10 | End: 2024-01-10 | Stop reason: HOSPADM

## 2024-01-09 RX ORDER — GABAPENTIN 300 MG/1
300 CAPSULE ORAL 3 TIMES DAILY
Qty: 90 CAPSULE
Start: 2024-01-09 | End: 2024-02-12

## 2024-01-09 RX ORDER — METHOCARBAMOL 750 MG/1
750 TABLET, FILM COATED ORAL 3 TIMES DAILY
Qty: 30 TABLET | Refills: 0
Start: 2024-01-09 | End: 2024-01-19

## 2024-01-09 RX ORDER — ENOXAPARIN SODIUM 100 MG/ML
40 INJECTION SUBCUTANEOUS EVERY 24 HOURS
Status: DISCONTINUED | OUTPATIENT
Start: 2024-01-10 | End: 2024-01-09

## 2024-01-09 RX ADMIN — HYDROMORPHONE HYDROCHLORIDE 1 MG: 2 INJECTION INTRAMUSCULAR; INTRAVENOUS; SUBCUTANEOUS at 11:01

## 2024-01-09 RX ADMIN — METHOCARBAMOL 750 MG: 750 TABLET ORAL at 09:01

## 2024-01-09 RX ADMIN — GABAPENTIN 300 MG: 300 CAPSULE ORAL at 09:01

## 2024-01-09 RX ADMIN — GABAPENTIN 300 MG: 300 CAPSULE ORAL at 08:01

## 2024-01-09 RX ADMIN — HYDROMORPHONE HYDROCHLORIDE 1 MG: 2 INJECTION INTRAMUSCULAR; INTRAVENOUS; SUBCUTANEOUS at 01:01

## 2024-01-09 RX ADMIN — HYDROCODONE BITARTRATE AND ACETAMINOPHEN 1 TABLET: 5; 325 TABLET ORAL at 07:01

## 2024-01-09 RX ADMIN — METHOCARBAMOL 750 MG: 750 TABLET ORAL at 03:01

## 2024-01-09 RX ADMIN — ENOXAPARIN SODIUM 40 MG: 100 INJECTION SUBCUTANEOUS at 09:01

## 2024-01-09 RX ADMIN — HYDROCODONE BITARTRATE AND ACETAMINOPHEN 1 TABLET: 5; 325 TABLET ORAL at 04:01

## 2024-01-09 RX ADMIN — ALUMINUM HYDROXIDE, MAGNESIUM HYDROXIDE, AND DIMETHICONE 30 ML: 200; 20; 200 SUSPENSION ORAL at 07:01

## 2024-01-09 RX ADMIN — GABAPENTIN 300 MG: 300 CAPSULE ORAL at 03:01

## 2024-01-09 RX ADMIN — METHOCARBAMOL 750 MG: 750 TABLET ORAL at 08:01

## 2024-01-09 NOTE — PLAN OF CARE
Pt reported she just returned to bed after sitting up in chair for 3 hours. Pt very motivated. Encouraged and reviewed exercises to perform throughout the day. Pt verbalized understanding.  Rec high intensity therapy at discharge
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
## 2024-01-09 NOTE — PLAN OF CARE
TX COMPLETED: pt reporting fatigue following sitting OOB x 3.5 hours, unwilling/unable to participate at this time. Pt agreeable to education of supine therex. Cont POC

## 2024-01-09 NOTE — PLAN OF CARE
01/09/24 0917   Post-Acute Status   Post-Acute Authorization Placement   Post-Acute Placement Status Pending payor review/awaiting authorization (if required)   Coverage bcbs   Discharge Plan   Discharge Plan A Rehab

## 2024-01-09 NOTE — PT/OT/SLP PROGRESS
Occupational Therapy   Treatment    Name: Harrison Estevez  MRN: [id number]  Admitting Diagnosis:  Tibia/fibula fracture, right, closed, initial encounter  5 Days Post-Op    Recommendations:     Discharge Recommendations: High Intensity Therapy  Discharge Equipment Recommendations:  to be determined by next level of care  Barriers to discharge:       Assessment:     Harrison Estevez is a 48 y.o. female with a medical diagnosis of Tibia/fibula fracture, right, closed, initial encounter.   Performance deficits affecting function are weakness, gait instability, decreased upper extremity function, impaired endurance, impaired balance, decreased lower extremity function, decreased safety awareness, orthopedic precautions, impaired self care skills, impaired functional mobility, pain.     Rehab Prognosis:  Good; patient would benefit from acute skilled OT services to address these deficits and reach maximum level of function.       Plan:     Patient to be seen 2 x/week to address the above listed problems via self-care/home management, therapeutic activities, therapeutic exercises  Plan of Care Expires: 01/19/24  Plan of Care Reviewed with: patient, family    Subjective     Chief Complaint: None stated  Patient/Family Comments/goals: Return to PLOF  Pain/Comfort:  Pain Rating 1: 0/10  Pain Rating Post-Intervention 1: 0/10    Objective:     Communicated with: nurse prior to session.  Patient found supine with peripheral IV, telemetry upon OT entry to room.    General Precautions: Standard, fall    Orthopedic Precautions:RLE non weight bearing  Braces:  (CAM bootRLE)  Respiratory Status: Room air     Occupational Performance:       AMPA 6 Click ADL: 17    Treatment & Education:  Pt supine just following 3 hours of sitting up in bedside chair. Reviewed with patient exercises to perform throughout the day to increase functional strength and endurance. Pt verbalized understanding. Pt was stating the CAM boot was making it  increasingly difficult to transfer and reposition in bed. Encouraged patient to notify doctor and to clarify wear schedule/CAM precautions. Pt verbalized understanding. Encouraged patient to call for assistance to transfer to/from bed/chair to prevent falls. Pt verbalized understanding.       Patient left supine with all lines intact, call button in reach, and family present    GOALS:   Multidisciplinary Problems       Occupational Therapy Goals          Problem: Occupational Therapy    Goal Priority Disciplines Outcome Interventions   Occupational Therapy Goal     OT, PT/OT Ongoing, Progressing    Description: O.T. GOALS TO BE MET BY 1-18-24  PT WILL TOLERATE 1 SET X 15 REPS B UE ROM EXERCISE  S WITH UE DRESSING  MIN A WITH TOILET T/F'S (BSC)  MIN A WITH TOILET ING   MIN A WITH LE DRESSING                         Time Tracking:     OT Date of Treatment: 01/09/24  OT Start Time: 1330  OT Stop Time: 1345  OT Total Time (min): 15 min    Billable Minutes:Therapeutic Activity 15    SELVIN Salinas  OT/PRITI: OT          1/9/2024
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
## 2024-01-09 NOTE — PROGRESS NOTES
Patient is stable. No signs or symptoms of acute distress. Pain management with pain meds. Meds given as ordered. Incision, CDI. Patient remained free from falls. Bed in lowest position, call light in reach. Chart orders reviewed.

## 2024-01-09 NOTE — SUBJECTIVE & OBJECTIVE
Review of Systems   All other systems reviewed and are negative.    Objective:     Vital Signs (Most Recent):  Temp: 97.9 °F (36.6 °C) (01/09/24 1330)  Pulse: 92 (01/09/24 1330)  Resp: 18 (01/09/24 1641)  BP: (!) 154/78 (01/09/24 1330)  SpO2: 98 % (01/09/24 1330) Vital Signs (24h Range):  Temp:  [97.9 °F (36.6 °C)-98.8 °F (37.1 °C)] 97.9 °F (36.6 °C)  Pulse:  [] 92  Resp:  [18] 18  SpO2:  [96 %-98 %] 98 %  BP: (123-163)/(71-78) 154/78     Weight: 106.2 kg (234 lb 2.1 oz)  Body mass index is 41.47 kg/m².    Intake/Output Summary (Last 24 hours) at 1/9/2024 1713  Last data filed at 1/9/2024 0728  Gross per 24 hour   Intake 270 ml   Output 3200 ml   Net -2930 ml           Physical Exam  Vitals and nursing note reviewed.   Constitutional:       General: She is not in acute distress.     Appearance: Normal appearance. She is normal weight.   Cardiovascular:      Rate and Rhythm: Normal rate and regular rhythm.      Heart sounds: No murmur heard.  Pulmonary:      Effort: Pulmonary effort is normal. No respiratory distress.      Breath sounds: No wheezing.   Musculoskeletal:      Comments: RLE in boot   Neurological:      General: No focal deficit present.      Mental Status: She is alert and oriented to person, place, and time.   Psychiatric:         Mood and Affect: Mood normal.         Behavior: Behavior normal.             Significant Labs: All pertinent labs within the past 24 hours have been reviewed.  Recent Lab Results         01/09/24  1124   01/09/24  0527        Baso #   0.03       Basophil %   0.5       Differential Method   Automated       Eos #   0.2       Eosinophil %   4.0       Gran # (ANC)   3.3       Gran %   60.3       Hematocrit   25.2       Hemoglobin   7.9       Immature Grans (Abs)   0.02  Comment: Mild elevation in immature granulocytes is non specific and   can be seen in a variety of conditions including stress response,   acute inflammation, trauma and pregnancy. Correlation with other    laboratory and clinical findings is essential.         Immature Granulocytes   0.4       Lymph #   1.4       Lymph %   25.7       MCH   22.1       MCHC   31.3       MCV   71       Mono #   0.5       Mono %   9.1       MPV   9.2       nRBC   0       Platelet Estimate   Appears normal       Platelet Count   190       POCT Glucose 117         RBC   3.57       RDW   13.6       WBC   5.52               Significant Imaging: I have reviewed all pertinent imaging results/findings within the past 24 hours.    X-Ray Tibia Fibula 2 View Right   Final Result      Satisfactory postoperative appearance         Electronically signed by: Chintan Tate   Date:    01/04/2024   Time:    16:30      SURG FL Surgery Fluoro Usage   Final Result      X-Ray Tibia Fibula 2 View Right   Final Result      As above.         Electronically signed by: Harjinder Akhtar   Date:    01/04/2024   Time:    15:32      CT Knee Without Contrast Right   Final Result      Acute fractures, as above.         Electronically signed by: Sandy Cloud   Date:    01/03/2024   Time:    01:28      CT Cervical Spine Without Contrast   Final Result      No acute findings.         Electronically signed by: Sandy Cloud   Date:    01/03/2024   Time:    01:32      X-Ray Chest AP Portable   Final Result      No acute findings.         Electronically signed by: Sandy Cloud   Date:    01/03/2024   Time:    00:40      X-Ray Hip 2 or 3 views Right (with Pelvis when performed)   Final Result      No acute abnormality.         Electronically signed by: Chintan Tate   Date:    01/02/2024   Time:    23:26      X-Ray Knee 1 or 2 View Right   Final Result      As above         Electronically signed by: Chintan Tate   Date:    01/02/2024   Time:    22:58      X-Ray Ankle 2 View Right   Final Result      As above         Electronically signed by: Chintan Tate   Date:    01/02/2024   Time:    23:00      X-Ray Tibia Fibula 2 View Right   Final Result      Acute  fractures, as above.         Electronically signed by: Sandy Cloud   Date:    01/02/2024   Time:    23:18      CT Maxillofacial Without Contrast   Final Result      No acute fracture or dislocation of the facial bones as far seen         Electronically signed by: Chintan Tate   Date:    01/02/2024   Time:    22:40

## 2024-01-09 NOTE — PLAN OF CARE
Discussed plan of care with patient and this patient, verbalized understanding.  Patient remains free from injury.  Safety and comfort precautions maintained this shift.   Call light and personal belongings within reach, bed in low position with bed wheels locked.  No s/s of acute distress.   Purposeful rounding continued this shift.  Pain levels are being controlled per MD order. IVF infusing.  Vital signs continued per order.  Q2 repositioning to the right lower extremity.  Patient mobility status is monitored by staff.  Chart and orders review completed.   Patient education about care completed.       Problem: Adult Inpatient Plan of Care  Goal: Plan of Care Review  Outcome: Ongoing, Progressing  Goal: Patient-Specific Goal (Individualized)  Outcome: Ongoing, Progressing  Goal: Optimal Comfort and Wellbeing  Outcome: Ongoing, Progressing  Goal: Readiness for Transition of Care  Outcome: Ongoing, Progressing     Problem: Skin Injury Risk Increased  Goal: Skin Health and Integrity  Outcome: Ongoing, Progressing     Problem: Bariatric Environmental Safety  Goal: Safety Maintained with Care  Outcome: Ongoing, Progressing     Problem: Fall Injury Risk  Goal: Absence of Fall and Fall-Related Injury  Outcome: Ongoing, Progressing     Problem: Embolism (Orthopaedic Fracture)  Goal: Absence of Embolism Signs and Symptoms  Outcome: Ongoing, Progressing     Problem: Delayed Union/Nonunion (Orthopaedic Fracture)  Goal: Fracture Stability  Outcome: Ongoing, Progressing     Problem: Functional Ability Impaired (Orthopaedic Fracture)  Goal: Optimal Functional Ability  Outcome: Ongoing, Progressing     Problem: Infection (Orthopaedic Fracture)  Goal: Absence of Infection Signs and Symptoms  Outcome: Ongoing, Progressing     Problem: Neurovascular Compromise (Orthopaedic Fracture)  Goal: Effective Tissue Perfusion  Outcome: Ongoing, Progressing     Problem: Pain (Orthopaedic Fracture)  Goal: Acceptable Pain Control  Outcome:  Ongoing, Progressing     Problem: Respiratory Compromise (Orthopaedic Fracture)  Goal: Effective Oxygenation and Ventilation  Outcome: Ongoing, Progressing     Problem: Self-Care Deficit  Goal: Improved Ability to Complete Activities of Daily Living  Outcome: Ongoing, Progressing     Problem: Infection  Goal: Absence of Infection Signs and Symptoms  Outcome: Ongoing, Progressing
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
## 2024-01-10 VITALS
HEART RATE: 115 BPM | RESPIRATION RATE: 18 BRPM | SYSTOLIC BLOOD PRESSURE: 126 MMHG | TEMPERATURE: 99 F | HEIGHT: 63 IN | DIASTOLIC BLOOD PRESSURE: 85 MMHG | OXYGEN SATURATION: 100 % | WEIGHT: 234.13 LBS | BODY MASS INDEX: 41.48 KG/M2

## 2024-01-10 LAB
ANISOCYTOSIS BLD QL SMEAR: SLIGHT
BASOPHILS # BLD AUTO: 0.05 K/UL (ref 0–0.2)
BASOPHILS NFR BLD: 0.8 % (ref 0–1.9)
DIFFERENTIAL METHOD BLD: ABNORMAL
EOSINOPHIL # BLD AUTO: 0.2 K/UL (ref 0–0.5)
EOSINOPHIL NFR BLD: 3.6 % (ref 0–8)
ERYTHROCYTE [DISTWIDTH] IN BLOOD BY AUTOMATED COUNT: 13.8 % (ref 11.5–14.5)
GIANT PLATELETS BLD QL SMEAR: PRESENT
HCT VFR BLD AUTO: 31.4 % (ref 37–48.5)
HGB BLD-MCNC: 9.6 G/DL (ref 12–16)
HYPOCHROMIA BLD QL SMEAR: ABNORMAL
IMM GRANULOCYTES # BLD AUTO: 0.01 K/UL (ref 0–0.04)
IMM GRANULOCYTES NFR BLD AUTO: 0.2 % (ref 0–0.5)
LYMPHOCYTES # BLD AUTO: 1.1 K/UL (ref 1–4.8)
LYMPHOCYTES NFR BLD: 18.3 % (ref 18–48)
MCH RBC QN AUTO: 21.2 PG (ref 27–31)
MCHC RBC AUTO-ENTMCNC: 30.6 G/DL (ref 32–36)
MCV RBC AUTO: 69 FL (ref 82–98)
MONOCYTES # BLD AUTO: 0.7 K/UL (ref 0.3–1)
MONOCYTES NFR BLD: 11 % (ref 4–15)
NEUTROPHILS # BLD AUTO: 4.1 K/UL (ref 1.8–7.7)
NEUTROPHILS NFR BLD: 66.1 % (ref 38–73)
NRBC BLD-RTO: 0 /100 WBC
PLATELET # BLD AUTO: 417 K/UL (ref 150–450)
PLATELET BLD QL SMEAR: ABNORMAL
PMV BLD AUTO: 8.8 FL (ref 9.2–12.9)
POCT GLUCOSE: 103 MG/DL (ref 70–110)
RBC # BLD AUTO: 4.53 M/UL (ref 4–5.4)
STOMATOCYTES BLD QL SMEAR: PRESENT
TARGETS BLD QL SMEAR: ABNORMAL
WBC # BLD AUTO: 6.19 K/UL (ref 3.9–12.7)

## 2024-01-10 PROCEDURE — 97530 THERAPEUTIC ACTIVITIES: CPT

## 2024-01-10 PROCEDURE — 25000003 PHARM REV CODE 250: Performed by: NURSE PRACTITIONER

## 2024-01-10 PROCEDURE — 25000003 PHARM REV CODE 250: Performed by: ORTHOPAEDIC SURGERY

## 2024-01-10 PROCEDURE — 85025 COMPLETE CBC W/AUTO DIFF WBC: CPT

## 2024-01-10 PROCEDURE — 97535 SELF CARE MNGMENT TRAINING: CPT

## 2024-01-10 PROCEDURE — 25000003 PHARM REV CODE 250: Performed by: HOSPITALIST

## 2024-01-10 PROCEDURE — 99024 POSTOP FOLLOW-UP VISIT: CPT | Mod: ,,, | Performed by: ORTHOPAEDIC SURGERY

## 2024-01-10 RX ADMIN — GABAPENTIN 300 MG: 300 CAPSULE ORAL at 03:01

## 2024-01-10 RX ADMIN — METHOCARBAMOL 750 MG: 750 TABLET ORAL at 03:01

## 2024-01-10 RX ADMIN — OXYCODONE AND ACETAMINOPHEN 1 TABLET: 10; 325 TABLET ORAL at 09:01

## 2024-01-10 RX ADMIN — POLYETHYLENE GLYCOL 3350 17 G: 17 POWDER, FOR SOLUTION ORAL at 09:01

## 2024-01-10 RX ADMIN — GABAPENTIN 300 MG: 300 CAPSULE ORAL at 09:01

## 2024-01-10 RX ADMIN — METHOCARBAMOL 750 MG: 750 TABLET ORAL at 09:01

## 2024-01-10 NOTE — PT/OT/SLP PROGRESS
Occupational Therapy  Treatment    Harrison Estevez   MRN: [id number]   Admitting Diagnosis: Tibia/fibula fracture, right, closed, initial encounter    OT Date of Treatment: 01/10/24   OT Start Time: 1100  OT Stop Time: 1200  OT Total Time (min): 60 min    Billable Minutes:Self Care/Home Management 45 MINUTES and Therapeutic Activity 15 MINUTES    OT/PRITI: OT          General Precautions: Standard, fall  Orthopedic Precautions: RLE non weight bearing  Braces: N/A  Respiratory Status: Room air         Subjective:  Communicated with NURSE AND EPIC CHART REVIEW prior to session.    Pain/Comfort  Pain Rating 1: 0/10    Objective:  Patient found with: peripheral IV, telemetry     Functional Mobility:  Bed Mobility:  MIN A WITH R LE LEG LIFT    Transfers:   MOD A WITH SIT<>STAND TRANSFERS WITH ROLLING WALKER VC'S FOR SAFETY AND ADHERENCE TO WEIGHT BEARING PRECAUTION    MIN A WITH STEP <PIVOT TRANSFER WITH ROLLING WALKER  AND VC'S FOR SAFETY AND ADHERENCE TO WEIGHT BEARING PRECEUTIONS  Activities of Daily Living:  (S) FOR SET UP ONLY WITH  G/H TASK SEATED IN BED SIDE CHAIR   UE CGA WITH SANTO/ DOFF PULLOVER SHIRT  LE DRESSING MOD A SANTO PANTS AND UNDERWEAR   SIMPLE BATHING SEATED IN BED SIDE CHAIR MOD A   Balance:   Static Sit: GOOD: Takes MODERATE challenges from all directions  Dynamic Sit: GOOD: Maintains balance through MODERATE excursions of active trunk movement  Static Stand: POOR+: Needs MINIMAL assist to maintain  Dynamic stand: POOR+: Needs MIN (minimal ) assist during gait    Therapeutic Activities and Exercises:  Educated patient on importance of increased tolerance to upright position and direct impact on CV endurance and strength. Patient encouraged to sit up in chair/ EOB, for a minimum of 2 consecutive hours including for all meals..Pt educated on how to adhere to weight bearing precaution Encouraged patient to perform AROM TE to BUE throughout the day within all available planes of motion. Re enforced importance  "of utilizing call light to meet needs in room and not attempt to get up without staff assistance. Patient verbalized understanding and agreed to comply.           AM-PAC 6 CLICK ADL   How much help from another person does this patient currently need?   1 = Unable, Total/Dependent Assistance  2 = A lot, Maximum/Moderate Assistance  3 = A little, Minimum/Contact Guard/Supervision  4 = None, Modified [place]/Independent    Putting on and taking off regular lower body clothing? : 2  Bathing (including washing, rinsing, drying)?: 2  Toileting, which includes using toilet, bedpan, or urinal? : 2  Putting on and taking off regular upper body clothing?: 3  Taking care of personal grooming such as brushing teeth?: 3  Eating meals?: 4  Daily Activity Total Score: 16     AM-PAC Raw Score CMS "G-Code Modifier Level of Impairment Assistance   6 % Total / Unable   7 - 8 CM 80 - 100% Maximal Assist   9-13 CL 60 - 80% Moderate Assist   14 - 19 CK 40 - 60% Moderate Assist   20 - 22 CJ 20 - 40% Minimal Assist   23 CI 1-20% SBA / CGA   24 CH 0% Independent/ Mod I       Patient left up in chair with all lines intact, call button in reach, NURSE notified, and DAUGHTER present    ASSESSMENT:  Harrison Estevez is a 48 y.o. female with a medical diagnosis of Tibia/fibula fracture, right, closed, initial encounter and presents with DEBILITY AND GENERALIZED WEAKNESS.    Rehab identified problem list/impairments:  weakness, impaired functional mobility, decreased safety awareness, gait instability, impaired endurance, impaired balance, impaired self care skills, decreased upper extremity function    Rehab potential is good.    Activity tolerance: Good    Discharge recommendations: High Intensity Therapy   Barriers to discharge:      Equipment recommendations: to be determined by next level of care    GOALS:   Multidisciplinary Problems       Occupational Therapy Goals          Problem: Occupational Therapy    Goal Priority " Disciplines Outcome Interventions   Occupational Therapy Goal     OT, PT/OT Ongoing, Progressing    Description: O.T. GOALS TO BE MET BY 1-18-24  PT WILL TOLERATE 1 SET X 15 REPS B UE ROM EXERCISE  S WITH UE DRESSING  MIN A WITH TOILET T/F'S (BSC)  MIN A WITH TOILET ING   MIN A WITH LE DRESSING                         Plan:  Patient to be seen 2 x/week to address the above listed problems via self-care/home management, therapeutic activities, therapeutic exercises  Plan of Care expires: 01/19/24  Plan of Care reviewed with: daughter, patient, family         01/10/2024
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
## 2024-01-10 NOTE — PLAN OF CARE
[facility] - Med Surg  Discharge Final Note    Primary Care Provider: No, Primary Doctor    Expected Discharge Date: 1/10/2024    Final Discharge Note (most recent)       Final Note - 01/10/24 0900          Final Note    Assessment Type Final Discharge Note     Anticipated Discharge Disposition Rehab Facility        Post-Acute Status    Post-Acute Authorization Placement     Post-Acute Placement Status Set-up Complete/Auth obtained     Coverage bcbs                                Contact Info       Brenda Henson DO   Specialty: Orthopedic Surgery    [address] [place] [address]   [facility] - [facility]oped[facility]  [facility] [address]   Phone: [phone]       Next Steps: Follow up in 2 week(s)    Instructions: Hospital discharge follow up
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
## 2024-01-10 NOTE — PLAN OF CARE
Discussed POC with patient and, patient verbalized understanding.   Patient remains free from falls and injury this shift.   Safety precautions maintained.  No s/s of acute distress.   Purposeful rounding continued this shift.  Pain controlled per MD order, check MAR.  Blood glucose monitoring continued this shift.  Diet orders continued this shift:   Vital signs continued per orders.  Q2 positioning continued this shift q 2 hours.   Chart check complete.  Patient education and care continued this shift.       Problem: Adult Inpatient Plan of Care  Goal: Plan of Care Review  Outcome: Ongoing, Progressing  Goal: Patient-Specific Goal (Individualized)  Outcome: Ongoing, Progressing  Goal: Absence of Hospital-Acquired Illness or Injury  Outcome: Ongoing, Progressing  Goal: Optimal Comfort and Wellbeing  Outcome: Ongoing, Progressing  Goal: Readiness for Transition of Care  Outcome: Ongoing, Progressing     Problem: Skin Injury Risk Increased  Goal: Skin Health and Integrity  Outcome: Ongoing, Progressing     Problem: Bariatric Environmental Safety  Goal: Safety Maintained with Care  Outcome: Ongoing, Progressing     Problem: Fall Injury Risk  Goal: Absence of Fall and Fall-Related Injury  Outcome: Ongoing, Progressing     Problem: Bleeding (Orthopaedic Fracture)  Goal: Absence of Bleeding  Outcome: Ongoing, Progressing     Problem: Embolism (Orthopaedic Fracture)  Goal: Absence of Embolism Signs and Symptoms  Outcome: Ongoing, Progressing     Problem: Delayed Union/Nonunion (Orthopaedic Fracture)  Goal: Fracture Stability  Outcome: Ongoing, Progressing     Problem: Functional Ability Impaired (Orthopaedic Fracture)  Goal: Optimal Functional Ability  Outcome: Ongoing, Progressing     Problem: Infection (Orthopaedic Fracture)  Goal: Absence of Infection Signs and Symptoms  Outcome: Ongoing, Progressing     Problem: Neurovascular Compromise (Orthopaedic Fracture)  Goal: Effective Tissue Perfusion  Outcome: Ongoing,  Progressing     Problem: Pain (Orthopaedic Fracture)  Goal: Acceptable Pain Control  Outcome: Ongoing, Progressing     Problem: Respiratory Compromise (Orthopaedic Fracture)  Goal: Effective Oxygenation and Ventilation  Outcome: Ongoing, Progressing     Problem: Self-Care Deficit  Goal: Improved Ability to Complete Activities of Daily Living  Outcome: Ongoing, Progressing     Problem: Infection  Goal: Absence of Infection Signs and Symptoms  Outcome: Ongoing, Progressing

## 2024-01-16 ENCOUNTER — TELEPHONE (OUTPATIENT)
Dept: ORTHOPEDICS | Facility: CLINIC | Age: 49
End: 2024-01-16
Payer: COMMERCIAL

## 2024-01-16 NOTE — TELEPHONE ENCOUNTER
Spoke with [facility] and informed her that the patient's boot should stay on and scheduled her postop appointment with [facility]. Understanding verbalized by both.

## 2024-01-16 NOTE — TELEPHONE ENCOUNTER
----- Message from Catherine Reddy sent at 1/16/2024  8:40 AM CST -----  [facility] if the cam boot can be removed at night when patient is in bed. Call back number is [phone]. [email or web address]

## 2024-01-22 ENCOUNTER — OFFICE VISIT (OUTPATIENT)
Dept: ORTHOPEDICS | Facility: CLINIC | Age: 49
End: 2024-01-22
Payer: COMMERCIAL

## 2024-01-22 VITALS
WEIGHT: 224 LBS | BODY MASS INDEX: 39.69 KG/M2 | HEART RATE: 85 BPM | DIASTOLIC BLOOD PRESSURE: 77 MMHG | TEMPERATURE: 98 F | HEIGHT: 63 IN | SYSTOLIC BLOOD PRESSURE: 134 MMHG

## 2024-01-22 DIAGNOSIS — S82.261D CLOSED DISPLACED SEGMENTAL FRACTURE OF SHAFT OF RIGHT TIBIA WITH ROUTINE HEALING, SUBSEQUENT ENCOUNTER: ICD-10-CM

## 2024-01-22 DIAGNOSIS — S84.01XD: ICD-10-CM

## 2024-01-22 DIAGNOSIS — S82.401D CLOSED FRACTURE OF SHAFT OF RIGHT FIBULA WITH ROUTINE HEALING, UNSPECIFIED FRACTURE MORPHOLOGY, SUBSEQUENT ENCOUNTER: ICD-10-CM

## 2024-01-22 DIAGNOSIS — M79.661 PAIN OF RIGHT LOWER LEG: Primary | ICD-10-CM

## 2024-01-22 DIAGNOSIS — G57.31 RIGHT PERONEAL NERVE PALSY: ICD-10-CM

## 2024-01-22 DIAGNOSIS — Z47.89 ORTHOPEDIC AFTERCARE: Primary | ICD-10-CM

## 2024-01-22 PROCEDURE — 3075F SYST BP GE 130 - 139MM HG: CPT | Mod: CPTII,S$GLB,, | Performed by: ORTHOPAEDIC SURGERY

## 2024-01-22 PROCEDURE — 1159F MED LIST DOCD IN RCRD: CPT | Mod: CPTII,S$GLB,, | Performed by: ORTHOPAEDIC SURGERY

## 2024-01-22 PROCEDURE — 99999 PR PBB SHADOW E&M-EST. PATIENT-LVL IV: CPT | Mod: PBBFAC,,, | Performed by: ORTHOPAEDIC SURGERY

## 2024-01-22 PROCEDURE — 99024 POSTOP FOLLOW-UP VISIT: CPT | Mod: S$GLB,,, | Performed by: ORTHOPAEDIC SURGERY

## 2024-01-22 PROCEDURE — 3078F DIAST BP <80 MM HG: CPT | Mod: CPTII,S$GLB,, | Performed by: ORTHOPAEDIC SURGERY

## 2024-01-22 PROCEDURE — 1160F RVW MEDS BY RX/DR IN RCRD: CPT | Mod: CPTII,S$GLB,, | Performed by: ORTHOPAEDIC SURGERY

## 2024-01-22 RX ORDER — OXYCODONE AND ACETAMINOPHEN 10; 325 MG/1; MG/1
1 TABLET ORAL EVERY 6 HOURS PRN
COMMUNITY
End: 2024-05-13

## 2024-01-22 NOTE — PATIENT INSTRUCTIONS
You may shower starting tonight with the incisions uncover.      Pat dry.  Don't scrub too hard.        You may use whatever moisturizer you prefer.      For the nerve pain:    Neurontin (gabapentin) 300mg  (1 tablet?) in the morning, in the middle of the day      At night take 600mg (?2 tablets?)      Still no weight on the leg!     Keep up the exercises including trying to move foot and toes up and down!!     You may take the boot off for limited amounts of time while seated and doing nothing    Keep elevating as well for the swelling    Keep taking Vitamin C 500mg twice a day  Take Vitamin D 5000IU (125mcg) at least 1 a day

## 2024-01-24 ENCOUNTER — PATIENT MESSAGE (OUTPATIENT)
Dept: ORTHOPEDICS | Facility: CLINIC | Age: 49
End: 2024-01-24
Payer: COMMERCIAL

## 2024-01-24 RX ORDER — ENOXAPARIN SODIUM 100 MG/ML
40 INJECTION SUBCUTANEOUS EVERY 24 HOURS
Qty: 12 ML | Refills: 0
Start: 2024-01-24 | End: 2024-01-25 | Stop reason: SDUPTHER

## 2024-01-26 RX ORDER — ENOXAPARIN SODIUM 100 MG/ML
40 INJECTION SUBCUTANEOUS EVERY 24 HOURS
Qty: 12 ML | Refills: 0
Start: 2024-01-26 | End: 2024-02-25

## 2024-01-26 NOTE — PROGRESS NOTES
Date of Surgery     (2024)  Surgery                   RIGHT Tibia IMN                                Chief Complaint: Post-op Evaluation and Pain of the Right Lower Leg      HPI: Harrison Estevez is a 48 y.o.  female who is here for follow-up of her surgery.  She is accompanied by two of her daughter Sherita.    Today, the patient's pain is mild but can be moderately severe at times.  She is having a lot of numbness and tingling and a burning pain.  She is being discharged from rehab today.    The numbness and burning is more bothersome than the pain.     Past Medical History:   Diagnosis Date    Abnormal Pap smear 2011    Cryosurgery done    General anesthetics causing adverse effect in therapeutic use     slow to wake up following      Past Surgical History:   Procedure Laterality Date    ANKLE SURGERY      APPENDECTOMY      GYNECOLOGIC CRYOSURGERY  2011    INSERTION OF INTRAMEDULLARY NAIL INTO TIBIA Right 2024    Procedure: INSERTION, INTRAMEDULLARY JANIS, TIBIA;  Surgeon: Brenda Henson DO;  Location: [facility] OR;  Service: Orthopedics;  Laterality: Right;    ROBOT-ASSISTED LAPAROSCOPIC ABDOMINAL HYSTERECTOMY USING DA NICKY XI N/A 2023    Procedure: XI ROBOTIC HYSTERECTOMY;  Surgeon: BHUMI Shine MD;  Location: [facility] OR;  Service: OB/GYN;  Laterality: N/A;     Family History   Problem Relation Age of Onset    Breast cancer Maternal Grandmother     Breast cancer Maternal Aunt      Social History     Socioeconomic History    Marital status:    Tobacco Use    Smoking status: Never    Smokeless tobacco: Never   Substance and Sexual Activity    Alcohol use: Yes     Comment: occ; hold 72hrs    Drug use: No    Sexual activity: Yes     Partners: Male     Current Outpatient Medications   Medication Instructions    enoxaparin (LOVENOX) 40 mg, Subcutaneous, Every 24 hours    gabapentin (NEURONTIN) 300 mg, Oral, 3 times daily    oxyCODONE-acetaminophen (PERCOCET)   mg per tablet 1 tablet, Oral, Every 6 hours PRN    polyethylene glycol (GLYCOLAX) 17 g, Oral, Daily PRN     Review of patient's allergies indicates:   Allergen Reactions    Penicillanic sulfone bl beta-lactamase inhibitors Hives       Physical Exam:     Wt Readings from Last 1 Encounters:   01/22/24 101.6 kg (224 lb)     Temp Readings from Last 1 Encounters:   01/22/24 97.8 °F (36.6 °C) (Oral)     BP Readings from Last 1 Encounters:   01/22/24 134/77     Pulse Readings from Last 1 Encounters:   01/22/24 85           General Appearance:   NAD, well appearing, cooperative    Neurologic:  Alert and oriented x3    Pysch:  Age appropriate     STATION:  Seated erect in w/c     Musculoskeletal:     Right leg:  Incisions well healed.  No erythema. Swelling moderate and improving.  Ecchymosis evolving.  AROM of the knee is 20-80.  Ankle has dorsiflexion and can maintain at 90 but can actively only dorsiflex to 60.  Good ankle plantarflexion.  Has extension of both great toe and lesser toes.  Good toe flexion.  Sensation on posterior calf/medial calf and plantar foot signficiantly improved.  Sensation on lateral calf, dorsal foot, first webspace is nearly  Distal neurovascular status intact.                        IMAGING: No new XR    Assessment:       Encounter Diagnoses   Name Primary?    Orthopedic aftercare Yes    Closed displaced segmental fracture of shaft of right tibia with routine healing, subsequent encounter     Right peroneal nerve palsy     Closed fracture of shaft of right fibula with routine healing, unspecified fracture morphology, subsequent encounter     Injury of right tibial nerve, subsequent encounter               DISCUSSION:   Patient's symptoms, imaging, diagnosis and prognosis reviewed and discussed.  Discussed  healing progression.   Discussed weight bearing status and the progression Discussed the need for compliance with treatment.     Patient given an opportunity to ask questions.        Plan:       Harrison was seen today for post-op evaluation and pain.    Diagnoses and all orders for this visit:    Orthopedic aftercare    Closed displaced segmental fracture of shaft of right tibia with routine healing, subsequent encounter    Right peroneal nerve palsy    Closed fracture of shaft of right fibula with routine healing, unspecified fracture morphology, subsequent encounter    Injury of right tibial nerve, subsequent encounter         Weight bearing:    Right leg Non-Weight Bearing   Precautions:  boot on at all times eXCEPT when showering and when seated  Ice/elevate right leg to help decrease pain and swelling  Continue with gabapentin to help with nerve pain but increase to two tablets at bedtime  Add vitamin C 500mg and Vitamin D 5000iu (125mcg) daily  Follow-up in  3w with XR out of boot    The patient understands, chooses and consents to this plan and accepts all   the risks which include but are not limited to the risks mentioned above.             Brenda Henson DO, CAQSM, [facility]  Orthopaedic Surgeon

## 2024-02-12 ENCOUNTER — HOSPITAL ENCOUNTER (OUTPATIENT)
Dept: RADIOLOGY | Facility: HOSPITAL | Age: 49
Discharge: HOME | End: 2024-02-12
Attending: ORTHOPAEDIC SURGERY
Payer: COMMERCIAL

## 2024-02-12 ENCOUNTER — OFFICE VISIT (OUTPATIENT)
Dept: ORTHOPEDICS | Facility: CLINIC | Age: 49
End: 2024-02-12
Payer: COMMERCIAL

## 2024-02-12 VITALS — SYSTOLIC BLOOD PRESSURE: 114 MMHG | TEMPERATURE: 98 F | DIASTOLIC BLOOD PRESSURE: 78 MMHG | HEART RATE: 75 BPM

## 2024-02-12 DIAGNOSIS — M79.661 PAIN OF RIGHT LOWER LEG: ICD-10-CM

## 2024-02-12 DIAGNOSIS — S82.261D CLOSED DISPLACED SEGMENTAL FRACTURE OF SHAFT OF RIGHT TIBIA WITH ROUTINE HEALING, SUBSEQUENT ENCOUNTER: Primary | ICD-10-CM

## 2024-02-12 DIAGNOSIS — M79.661 PAIN OF RIGHT LOWER LEG: Primary | ICD-10-CM

## 2024-02-12 DIAGNOSIS — S82.401D CLOSED FRACTURE OF SHAFT OF RIGHT FIBULA WITH ROUTINE HEALING, UNSPECIFIED FRACTURE MORPHOLOGY, SUBSEQUENT ENCOUNTER: ICD-10-CM

## 2024-02-12 PROCEDURE — 99024 POSTOP FOLLOW-UP VISIT: CPT | Mod: S$GLB,,, | Performed by: PHYSICIAN ASSISTANT

## 2024-02-12 PROCEDURE — 1160F RVW MEDS BY RX/DR IN RCRD: CPT | Mod: CPTII,S$GLB,, | Performed by: PHYSICIAN ASSISTANT

## 2024-02-12 PROCEDURE — 73590 X-RAY EXAM OF LOWER LEG: CPT | Mod: TC,RT

## 2024-02-12 PROCEDURE — 3074F SYST BP LT 130 MM HG: CPT | Mod: CPTII,S$GLB,, | Performed by: PHYSICIAN ASSISTANT

## 2024-02-12 PROCEDURE — 1159F MED LIST DOCD IN RCRD: CPT | Mod: CPTII,S$GLB,, | Performed by: PHYSICIAN ASSISTANT

## 2024-02-12 PROCEDURE — 99999 PR PBB SHADOW E&M-EST. PATIENT-LVL III: CPT | Mod: PBBFAC,,, | Performed by: PHYSICIAN ASSISTANT

## 2024-02-12 PROCEDURE — 3078F DIAST BP <80 MM HG: CPT | Mod: CPTII,S$GLB,, | Performed by: PHYSICIAN ASSISTANT

## 2024-02-12 PROCEDURE — 73590 X-RAY EXAM OF LOWER LEG: CPT | Mod: 26,RT,, | Performed by: RADIOLOGY

## 2024-02-12 RX ORDER — GABAPENTIN 300 MG/1
300 CAPSULE ORAL 3 TIMES DAILY
Qty: 90 CAPSULE | Refills: 0 | Status: SHIPPED | OUTPATIENT
Start: 2024-02-12 | End: 2024-05-13

## 2024-02-12 RX ORDER — METHOCARBAMOL 750 MG/1
750 TABLET, FILM COATED ORAL 3 TIMES DAILY PRN
Qty: 30 TABLET | Refills: 0 | Status: SHIPPED | OUTPATIENT
Start: 2024-02-12 | End: 2024-02-22

## 2024-02-12 NOTE — PROGRESS NOTES
Subjective:      Patient ID: Harrison Estevez is a 48 y.o. female.    Chief Complaint: Pain of the Right Lower Leg      HPI: Harrison Estevez is a 48-year-old female in clinic today for postoperative follow-up.  Patient is 5-1/2 weeks status post ORIF of right tibia with intramedullary nail.  Patient is doing well this time.  No new complaints.  She has been compliant with nonweightbearing to the right lower extremity.    Past Medical History:   Diagnosis Date    Abnormal Pap smear 2011    Cryosurgery done    General anesthetics causing adverse effect in therapeutic use     slow to wake up following        Current Outpatient Medications:     polyethylene glycol (GLYCOLAX) 17 gram PwPk, Take 17 g by mouth daily as needed for Constipation., Disp: , Rfl: 0    enoxaparin (LOVENOX) 40 mg/0.4 mL Syrg, Inject 0.4 mLs (40 mg total) into the skin Q24H (prophylaxis, 1700). (Patient not taking: Reported on 2024), Disp: 12 mL, Rfl: 0    gabapentin (NEURONTIN) 300 MG capsule, Take 1 capsule (300 mg total) by mouth 3 (three) times daily., Disp: 90 capsule, Rfl: 0    methocarbamoL (ROBAXIN) 750 MG Tab, Take 1 tablet (750 mg total) by mouth 3 (three) times daily as needed (muscle spasms)., Disp: 30 tablet, Rfl: 0    oxyCODONE-acetaminophen (PERCOCET)  mg per tablet, Take 1 tablet by mouth every 6 (six) hours as needed for Pain., Disp: , Rfl:   Review of patient's allergies indicates:   Allergen Reactions    Penicillanic sulfone bl beta-lactamase inhibitors Hives       /78   Pulse 75   Temp 98.1 °F (36.7 °C)   LMP 2023     ROS      Objective:    Ortho Exam   Right lower extremity:  Boot was removed for physical exam   Incisions are well healed, no signs of infection  Moderate edema   Mild TTP   ROM of the knee and ankle are decreased secondary to swelling and stiffness   Calf and compartments are soft and compressible  Motor exam normal   Sensation and pulses intact  Cap refill brisk    GEN: Well  developed, well nourished female.  AAOX3. No acute distress.   Head: Normocephalic, atraumatic.   Eyes: FARHAN  Neck: Trachea is midline, no adenopathy  Resp: Breathing unlabored.  Neuro: Motor function normal, Cranial nerves intact  Psych: Mood and affect appropriate.       Assessment:     Imaging:  X-ray right tibia/fibula obtained today shows hardware in satisfactory alignment with interval healing of the fractures, but not complete healing yet.  No detrimental changes.        1. Closed displaced segmental fracture of shaft of right tibia with routine healing, subsequent encounter    2. Closed fracture of shaft of right fibula with routine healing, unspecified fracture morphology, subsequent encounter          Plan:       Reviewed the radiographs with the patient.  Encouraged her on the progress she has made so far.  Explained that her fractures have not fully healed quite yet.  I would like her to continue with nonweightbearing in the fracture boot at this time.  She may remove the boot for bathing/showering and while not up and moving around.  We will see her back in clinic in about 3 weeks for repeat radiographs and further evaluation.    Orders Placed This Encounter    methocarbamoL (ROBAXIN) 750 MG Tab    gabapentin (NEURONTIN) 300 MG capsule     Follow up in about 3 weeks (around 3/4/2024).          Patient note was created using MModal Dictation.  Any errors in syntax or even information may not have been identified and edited on initial review prior to signing this note.

## 2024-02-13 ENCOUNTER — PATIENT MESSAGE (OUTPATIENT)
Dept: ORTHOPEDICS | Facility: CLINIC | Age: 49
End: 2024-02-13
Payer: COMMERCIAL

## 2024-03-04 ENCOUNTER — HOSPITAL ENCOUNTER (OUTPATIENT)
Dept: RADIOLOGY | Facility: HOSPITAL | Age: 49
Discharge: HOME | End: 2024-03-04
Attending: PHYSICIAN ASSISTANT
Payer: COMMERCIAL

## 2024-03-04 ENCOUNTER — OFFICE VISIT (OUTPATIENT)
Dept: ORTHOPEDICS | Facility: CLINIC | Age: 49
End: 2024-03-04
Payer: COMMERCIAL

## 2024-03-04 VITALS
WEIGHT: 224 LBS | SYSTOLIC BLOOD PRESSURE: 128 MMHG | DIASTOLIC BLOOD PRESSURE: 82 MMHG | BODY MASS INDEX: 39.69 KG/M2 | HEART RATE: 82 BPM | HEIGHT: 63 IN | TEMPERATURE: 99 F

## 2024-03-04 DIAGNOSIS — S82.261D CLOSED DISPLACED SEGMENTAL FRACTURE OF SHAFT OF RIGHT TIBIA WITH ROUTINE HEALING, SUBSEQUENT ENCOUNTER: Primary | ICD-10-CM

## 2024-03-04 DIAGNOSIS — M79.661 PAIN OF RIGHT LOWER LEG: ICD-10-CM

## 2024-03-04 DIAGNOSIS — S82.401D CLOSED FRACTURE OF SHAFT OF RIGHT FIBULA WITH ROUTINE HEALING, UNSPECIFIED FRACTURE MORPHOLOGY, SUBSEQUENT ENCOUNTER: ICD-10-CM

## 2024-03-04 DIAGNOSIS — M79.661 PAIN OF RIGHT LOWER LEG: Primary | ICD-10-CM

## 2024-03-04 PROCEDURE — 3079F DIAST BP 80-89 MM HG: CPT | Mod: CPTII,S$GLB,, | Performed by: PHYSICIAN ASSISTANT

## 2024-03-04 PROCEDURE — 1160F RVW MEDS BY RX/DR IN RCRD: CPT | Mod: CPTII,S$GLB,, | Performed by: PHYSICIAN ASSISTANT

## 2024-03-04 PROCEDURE — 3074F SYST BP LT 130 MM HG: CPT | Mod: CPTII,S$GLB,, | Performed by: PHYSICIAN ASSISTANT

## 2024-03-04 PROCEDURE — 99024 POSTOP FOLLOW-UP VISIT: CPT | Mod: S$GLB,,, | Performed by: PHYSICIAN ASSISTANT

## 2024-03-04 PROCEDURE — 99999 PR PBB SHADOW E&M-EST. PATIENT-LVL IV: CPT | Mod: PBBFAC,,, | Performed by: PHYSICIAN ASSISTANT

## 2024-03-04 PROCEDURE — 73590 X-RAY EXAM OF LOWER LEG: CPT | Mod: TC,RT

## 2024-03-04 PROCEDURE — 73590 X-RAY EXAM OF LOWER LEG: CPT | Mod: 26,RT,, | Performed by: RADIOLOGY

## 2024-03-04 PROCEDURE — 1159F MED LIST DOCD IN RCRD: CPT | Mod: CPTII,S$GLB,, | Performed by: PHYSICIAN ASSISTANT

## 2024-03-04 NOTE — PROGRESS NOTES
"Subjective:      Patient ID: Harrison Estevez is a 48 y.o. female.    Chief Complaint: Post-op Evaluation of the Right Lower Leg      HPI: Harrison Estevez is a 48-year-old female in clinic today for postoperative follow-up.  Patient is 8-1/2 weeks status post ORIF of right tibia with intramedullary nail.  Patient is doing well at this time.  No new complaints.  Patient was last seen in this clinic on 2024 and at that time was instructed to be nonweightbearing.  She has been compliant with this.  She denies numbness or tingling in the extremity.    Past Medical History:   Diagnosis Date    Abnormal Pap smear 2011    Cryosurgery done    General anesthetics causing adverse effect in therapeutic use     slow to wake up following        Current Outpatient Medications:     gabapentin (NEURONTIN) 300 MG capsule, Take 1 capsule (300 mg total) by mouth 3 (three) times daily., Disp: 90 capsule, Rfl: 0    oxyCODONE-acetaminophen (PERCOCET)  mg per tablet, Take 1 tablet by mouth every 6 (six) hours as needed for Pain., Disp: , Rfl:     polyethylene glycol (GLYCOLAX) 17 gram PwPk, Take 17 g by mouth daily as needed for Constipation. (Patient not taking: Reported on 3/4/2024), Disp: , Rfl: 0  Review of patient's allergies indicates:   Allergen Reactions    Penicillanic sulfone bl beta-lactamase inhibitors Hives       /82   Pulse 82   Temp 98.5 °F (36.9 °C)   Ht 5' 3" (1.6 m)   Wt 101.6 kg (224 lb)   LMP 2023   BMI 39.68 kg/m²     ROS      Objective:    Ortho Exam   Right lower extremity:   Boot was removed for physical exam   Incisions are well healed, no signs of infection   Moderate edema  Mild TTP  ROM of the knee and ankle are decreased secondary to swelling and stiffness  Calf and compartments are soft and compressible   Motor exam normal   Sensation and pulses intact  Cap refill brisk    GEN: Well developed, well nourished female. AAOX3. No acute distress.   Head: Normocephalic, " atraumatic.   Eyes: FARHAN  Neck: Trachea is midline, no adenopathy  Resp: Breathing unlabored.  Neuro: Motor function normal, Cranial nerves intact  Psych: Mood and affect appropriate.       Assessment:     Imaging:  X-ray right tibia/fibula obtained today shows hardware in satisfactory alignment no signs of failure.  There is some interval healing with increased callus formation noted.  No detrimental changes.        1. Closed displaced segmental fracture of shaft of right tibia with routine healing, subsequent encounter    2. Closed fracture of shaft of right fibula with routine healing, unspecified fracture morphology, subsequent encounter          Plan:       Reviewed the radiographs with the patient.  Encouraged her on the progress she has made so far.  Explained that her fracture is healing.  She may begin to partially weightbear as tolerated in the fracture boot.  We will start her in physical therapy at this time.  Patient may remove the boot for bathing/showering, but she understands that she has to wear the boot at all times while ambulating.  We will see her back in clinic in about 1 month for repeat radiographs and further evaluation.    Orders Placed This Encounter    Ambulatory referral/consult to Physical/Occupational Therapy     Follow up in about 1 month (around 4/4/2024).          Patient note was created using SimpliVT Dictation.  Any errors in syntax or even information may not have been identified and edited on initial review prior to signing this note.

## 2024-03-13 ENCOUNTER — CLINICAL SUPPORT (OUTPATIENT)
Dept: REHABILITATION | Facility: HOSPITAL | Age: 49
End: 2024-03-13
Payer: COMMERCIAL

## 2024-03-13 DIAGNOSIS — S82.401D CLOSED FRACTURE OF SHAFT OF RIGHT FIBULA WITH ROUTINE HEALING, UNSPECIFIED FRACTURE MORPHOLOGY, SUBSEQUENT ENCOUNTER: ICD-10-CM

## 2024-03-13 DIAGNOSIS — R26.9 GAIT DIFFICULTY: ICD-10-CM

## 2024-03-13 DIAGNOSIS — S82.261D CLOSED DISPLACED SEGMENTAL FRACTURE OF SHAFT OF RIGHT TIBIA WITH ROUTINE HEALING, SUBSEQUENT ENCOUNTER: ICD-10-CM

## 2024-03-13 DIAGNOSIS — R52 PAIN: Primary | ICD-10-CM

## 2024-03-13 DIAGNOSIS — R53.1 WEAKNESS: ICD-10-CM

## 2024-03-13 PROCEDURE — 97112 NEUROMUSCULAR REEDUCATION: CPT | Mod: PN

## 2024-03-13 PROCEDURE — 97161 PT EVAL LOW COMPLEX 20 MIN: CPT | Mod: PN

## 2024-03-13 PROCEDURE — 97530 THERAPEUTIC ACTIVITIES: CPT | Mod: PN

## 2024-03-13 NOTE — PLAN OF CARE
[facility]   Physical Therapy Initial Evaluation        Name: Harrison Estevez  [facility] Number: [id number]    Therapy Diagnosis:   Encounter Diagnoses   Name Primary?    Closed displaced segmental fracture of shaft of right tibia with routine healing, subsequent encounter     Closed fracture of shaft of right fibula with routine healing, unspecified fracture morphology, subsequent encounter     Pain Yes    Weakness     Gait difficulty      Physician: Mg Fiore, RAFAEL    Physician Orders: PT Eval and Treat   Medical Diagnosis from Referral:  Closed displaced segmental fracture of shaft of right tibia with routine healing, subsequent encounter   Closed fracture of shaft of right fibula with routine healing, unspecified fracture morphology, subsequent encounter  Evaluation Date: 3/13/2024  Authorization Period Expiration: 12/31/2024  Plan of Care Expiration: 5/24/24  Progress Note Due: 30 days  Visit # / Visits authorized: 1/ 1   FOTO: 1/3  Precautions: Standard, status post ORIF of right tibia with intramedullary nail with DOS on 1/4/24. She may begin to partially weightbear as tolerated in the fracture boot.  We will start her in physical therapy at this time.  Patient may remove the boot for bathing/showering, but she understands that she has to wear the boot at all times while ambulating.    Time In: 4:05  Time Out: 5:00  Total Billable Time: 55 minutes (low Complexity Evaluation, Therapeutic Exercise - 5, Manual Therapy - 0, Therapeutic activities- 15, Neuro muscular re education- 15)      Subjective   Date of onset: 1/4/24 status post ORIF of right tibia with intramedullary nail   History of current condition - Harrison reports: she has a f/u with ortho on 4/1/24. Patient reports pain in R knee and ankle.      Medical History:   Past Medical History:   Diagnosis Date    Abnormal Pap smear 01/01/2011    Cryosurgery done    General anesthetics causing adverse effect in therapeutic use      slow to wake up following        Surgical History:   Harrison Estevez  has a past surgical history that includes Appendectomy; Ankle surgery; Gynecologic cryosurgery (2011); Robot-assisted laparoscopic abdominal hysterectomy using da Nicole Xi (N/A, 2023); and Insertion of intramedullary nail into tibia (Right, 2024).    Medications:   Harrison has a current medication list which includes the following prescription(s): gabapentin, oxycodone-acetaminophen, and polyethylene glycol.    Allergies:   Review of patient's allergies indicates:   Allergen Reactions    Penicillanic sulfone bl beta-lactamase inhibitors Hives        Imaging, : See chart    Prior Therapy: no  Social History:  lives with their daughter  Occupation:  for [employer] stands 4 hours/ sits 4 hours and lifting about 30# with assistance  Prior Level of Function: independent  Current Level of Function: pain, difficulty walking and in fracture boot with PWB R LE, difficulty sleeping and finding a comfortable position    Pain:   Current /10, worst /10, best 1/10   Location: right knee mostly and R ankle   Description: Aching and Dull  Aggravating Factors: Walking  Easing Factors: rest    Pts goals: decrease pain and return to PRIOR LEVEL OF FUNCTION, working out 3-4 times a week walking and resistive training    Objective     Posture: R hand dominant, fracture boot donned, swelling R LE,   Palpation: TTP R knee and ankle  Sensation: numb and decrease sensations R LE medial   Range of Motion/Strength:         Knee Right Left Pain/Dysfunction with Movement   AROM      flexion  100  120    extension  0  0      Ankle Right Left Pain/Dysfunction with Movement   AROM/PROM      dorsiflexion  20 degrees from neutral  neutral    plantarflexion  40  60    inversion  3  20    eversion  10  30      L/E MMT Right Left Pain/Dysfunction with Movement   Knee Flexion 3/5 4/5    Knee Extension 2/5 4+/5    Ankle DF 3-/5 5/5    Ankle PF  2/5 NT    Ankle Inversion 3-/5 4/5    Ankle Eversion 3-/5 3+/5    Big Toe Extension 3+/5 5/5          Flexibility: mild hamstring tightness    Special Tests: patella mobility limited, not able to perform R SLR    Gait Analysis:With AD.  Device Used -  Rolling walker Assistance none  Deviations: step to pattern PWB R LE          Intake Outcome Measure for FOTO LE  Survey    Therapist reviewed FOTO scores for Harrison Estevez on 3/13/2024.   FOTO documents entered into [employer] - see Media section.    Intake Score: 38%       TREATMENT   Treatment Time In: 4:05  Treatment Time Out: 5:00  Total Treatment time separate from Evaluation: 35 minutes    Prenda received therapeutic exercises to develop ROM and flexibility for 5 minutes including:  AROM  Hamstring stretch      Prenda received the following manual therapy techniques:  were applied to the:  for 0 minutes, includin      neuromuscular re-education activities to improve: Balance, Coordination, Kinesthetic, Sense, Proprioception, and Posture for 15 minutes. The following activities were included:  Quad sets  SLR  Ankle pumps  Ankle alphabet    therapeutic activities to improve functional performance for 15  minutes, including:  -HEP issued and reviewed with patient, form and technique reviewed, benefits and frequency discussed  - transfers  - swelling management discussed  - RW height adjusted for patient    Prenda participated in gait training to improve functional mobility and safety for 0  minutes, includin    Prenda received cold pack for 0 minutes to 0.      Home Exercises Provided and Patient Education Provided       Education/Self-Care provided:   Patient educated on the impairments noted above and the effects of physical therapy intervention to improve overall condition and QOL.   Patient was educated on all the above exercise prior/during/after for proper posture, positioning, and execution for safe performance with home exercise program.     Written  Home Exercises Provided: yes.  Exercises were reviewed and Harrison was able to demonstrate them prior to the end of the session.  Harrison demonstrated good  understanding of the education provided.     See EMR under Patient Instructions for exercises provided 3/13/2024.      Assessment   Harrison is a 48 y.o. female referred to outpatient Physical Therapy with a medical diagnosis of Closed displaced segmental fracture of shaft of right tibia with routine healing, subsequent encounter ,  Closed fracture of shaft of right fibula with routine healing, unspecified fracture morphology, subsequent encounter. Patient is 10 weeks S/P ORIF of right tibia with intramedullary nail.  Pt presents with  swelling and minimal pain,  weakness, limited ROM, and decrease functional mobility. She is only allowed to PWB in walking boot. Patient would benefit from skilled PT intervention for post surgical rehab.     Pt prognosis is Good.   Pt will benefit from skilled outpatient Physical Therapy to address the deficits stated above and in the chart below, provide pt/family education, and to maximize pt's level of independence.     Plan of care discussed with patient: Yes  Pt's spiritual, cultural and educational needs considered and patient is agreeable to the plan of care and goals as stated below:     Anticipated Barriers for therapy: attendance       Medical Necessity is demonstrated by the following  History  Co-morbidities and personal factors that may impact the plan of care [x] LOW: no personal factors / co-morbidities  [] MODERATE: 1-2 personal factors / co-morbidities  [] HIGH: 3+ personal factors / co-morbidities     Moderate / High Support Documentation:   Co-morbidities affecting plan of care:      Personal Factors:   no deficits      Examination  Body Structures and Functions, activity limitations and participation restrictions that may impact the plan of care [x] LOW: addressing 1-2 elements  [] MODERATE: 3+ elements  [] HIGH:  4+ elements (please support below)     Moderate / High Support Documentation:       Clinical Presentation [x] LOW: stable  [] MODERATE: Evolving  [] HIGH: Unstable      Decision Making/ Complexity Score: low         Goals:  STG's 2 weeks  Patient will be independent with 50% of HEP    LTG's 10 weeks  Patient will improve FOTO functional measure  score  to 65 %  in order to improve overall QOL & return to PLOF   2. Patient will report an overall decrease in pain with ADL's and functional mobility  3. Patient will increase strength by at least 1/2 muscle grade in affected musculature to   improve functional mobility  4. Patient will improve R knee and ankle ROM to improve functional mobility and ADL's  5. Patient will be more aware of posture throughout the day to reduce stress  and maintain optimal alignment of the spine  6. Patient will be independent with HEP   Plan   Plan of care Certification: 3/13/2024 to 5/24/24.    Outpatient Physical Therapy 2-3  times weekly for 10 weeks to include the following interventions: Electrical Stimulation PRN, Gait Training, Manual Therapy, Moist Heat/ Ice, Neuromuscular Re-ed, Patient Education, Self Care, Therapeutic Activities, Therapeutic Exercise, and Ultrasound, ASTYM, Kinesiotaping PRN, Functional Dry Needling      Rosa Hernandez, PT        I CERTIFY THE NEED FOR THESE SERVICES FURNISHED UNDER THIS PLAN OF TREATMENT AND WHILE UNDER MY CARE   Physician's comments:     Physician's Signature: ___________________________________________________

## 2024-03-20 ENCOUNTER — DOCUMENTATION ONLY (OUTPATIENT)
Dept: REHABILITATION | Facility: HOSPITAL | Age: 49
End: 2024-03-20

## 2024-03-20 ENCOUNTER — CLINICAL SUPPORT (OUTPATIENT)
Dept: REHABILITATION | Facility: HOSPITAL | Age: 49
End: 2024-03-20
Payer: COMMERCIAL

## 2024-03-20 DIAGNOSIS — R53.1 WEAKNESS: ICD-10-CM

## 2024-03-20 DIAGNOSIS — R26.9 GAIT DIFFICULTY: ICD-10-CM

## 2024-03-20 DIAGNOSIS — R52 PAIN: Primary | ICD-10-CM

## 2024-03-20 PROCEDURE — 97112 NEUROMUSCULAR REEDUCATION: CPT | Mod: PN,CQ

## 2024-03-20 PROCEDURE — 97110 THERAPEUTIC EXERCISES: CPT | Mod: PN,CQ

## 2024-03-20 NOTE — PROGRESS NOTES
"[facility]   Physical Therapy Treatment Note      Name: Harrison Estevez  [facility] Number: [id number]    Therapy Diagnosis:   Encounter Diagnoses   Name Primary?    Pain Yes    Weakness     Gait difficulty      Physician: Mg Fiore PA-C    Visit Date: 3/20/2024    Physician Orders: PT Eval and Treat   Medical Diagnosis from Referral:  Closed displaced segmental fracture of shaft of right tibia with routine healing, subsequent encounter   Closed fracture of shaft of right fibula with routine healing, unspecified fracture morphology, subsequent encounter  Evaluation Date: 3/13/2024  Authorization Period Expiration: 12/31/2024  Plan of Care Expiration: 5/24/24  Progress Note Due: 30 days  Visit # / Visits authorized: 1/ 1   FOTO: 1/3  Precautions: Standard, status post ORIF of right tibia with intramedullary nail with DOS on 1/4/24. She may begin to partially weightbear as tolerated in the fracture boot.  We will start her in physical therapy at this time.  Patient may remove the boot for bathing/showering, but she understands that she has to wear the boot at all times while ambulating.    PTA Visit #: 1/5     Time In: 3:34pm  Time Out: 4:30pm  Total Time: 53 minutes    Subjective     Patient reports: pain most prevalent at night but at most gets to a 3/10. Patient reports ready to be out of the boot and walker.  She was compliant with home exercise program.  Response to previous treatment: n/a  Functional change: n/a    Pain: 2/10  Location: right leg      Objective      Objective Measures updated at progress report unless specified.     Treatment     Harrison received the treatments listed below:      therapeutic exercises to develop strength, endurance, ROM, and flexibility for 30 minutes including:  Bridges on ball 3 x10  DKTC with ball; 2 x10  Single knee fall out; Green Theraband 2 x10 each  Supine hip adduction into ball; 5" hold 2 x10  Sidelying Clams; 2 x10    manual therapy " "techniques: Myofacial release and Soft tissue Mobilization were applied to the: right lower extremity for 00 minutes, including:  Not performed    neuromuscular re-education activities to improve: Balance, Coordination, Kinesthetic, and Proprioception for 23 minutes. The following activities were included:  Quad sets; 10" x15  SAQ; 5" hold 2 x10  Seated heel slides; 2 x10  Seated Marches maintain core stability  PPT; 3 x10    therapeutic activities to improve functional performance for 00  minutes, including:  Not performed    gait training to improve functional mobility and safety for 00  minutes, including:  Not performed    Patient Education and Home Exercises       Education provided:   - continuing to follow weightbearing precautions at this time    Written Home Exercises Provided: Patient instructed to cont prior HEP. Exercises were reviewed and Harrison was able to demonstrate them prior to the end of the session.  Harrison demonstrated good  understanding of the education provided. See Electronic Medical Record under Patient Instructions for exercises provided during therapy sessions    Assessment     Today is patient's first treatment visit since initial evaluation. Introduced patient to Range of Motion and strengthening activities focusing on quadriceps/knee as patient demonstrated tolerance to. Included core stability and hip strengthening activities to treatment too in attempt to minimize potential irritations from weightbearing in boot. Patient was able to complete all prescribed activities without limitations of knee pain being present. Plan to continue strengthening activities in non weightbearing until update from MD. Terry Is progressing well towards her goals.   Patient prognosis is Good.     Patient will continue to benefit from skilled outpatient physical therapy to address the deficits listed in the problem list box on initial evaluation, provide pt/family education and to maximize pt's level of " independence in the home and community environment.     Patient's spiritual, cultural and educational needs considered and pt agreeable to plan of care and goals.     Anticipated barriers to physical therapy: attendance     Goals:   STG's 2 weeks  Patient will be independent with 50% of HEP     LTG's 10 weeks  Patient will improve FOTO functional measure  score  to 65 %  in order to improve overall QOL & return to PLOF   2. Patient will report an overall decrease in pain with ADL's and functional mobility  3. Patient will increase strength by at least 1/2 muscle grade in affected musculature to   improve functional mobility  4. Patient will improve R knee and ankle ROM to improve functional mobility and ADL's  5. Patient will be more aware of posture throughout the day to reduce stress  and maintain optimal alignment of the spine  6. Patient will be independent with HEP   Plan   Plan of care Certification: 3/13/2024 to 5/24/24.     Outpatient Physical Therapy 2-3  times weekly for 10 weeks to include the following interventions: Electrical Stimulation PRN, Gait Training, Manual Therapy, Moist Heat/ Ice, Neuromuscular Re-ed, Patient Education, Self Care, Therapeutic Activities, Therapeutic Exercise, and Ultrasound, ASTYM, Kinesiotaping PRN, Functional Dry Needling    Rohan Dominguez, PTA

## 2024-03-20 NOTE — PROGRESS NOTES
PT/PTA met face to face to discuss pt's treatment plan and progress towards established goals. Pt will be seen by a physical therapist minimally every 6th visit or every 30 days.    Rohan Dominguez PTA

## 2024-03-22 ENCOUNTER — CLINICAL SUPPORT (OUTPATIENT)
Dept: REHABILITATION | Facility: HOSPITAL | Age: 49
End: 2024-03-22
Payer: COMMERCIAL

## 2024-03-22 DIAGNOSIS — R52 PAIN: Primary | ICD-10-CM

## 2024-03-22 DIAGNOSIS — R26.9 GAIT DIFFICULTY: ICD-10-CM

## 2024-03-22 DIAGNOSIS — R53.1 WEAKNESS: ICD-10-CM

## 2024-03-22 PROCEDURE — 97140 MANUAL THERAPY 1/> REGIONS: CPT | Mod: PN,CQ

## 2024-03-22 PROCEDURE — 97110 THERAPEUTIC EXERCISES: CPT | Mod: PN,CQ

## 2024-03-22 PROCEDURE — 97112 NEUROMUSCULAR REEDUCATION: CPT | Mod: PN,CQ

## 2024-03-22 NOTE — PROGRESS NOTES
"[facility]   Physical Therapy Treatment Note      Name: Harrison Estevez  [facility] Number: [id number]    Therapy Diagnosis:   Encounter Diagnoses   Name Primary?    Pain Yes    Weakness     Gait difficulty      Physician: Mg Fiore PA-C    Visit Date: 3/22/2024    Physician Orders: PT Eval and Treat   Medical Diagnosis from Referral:  Closed displaced segmental fracture of shaft of right tibia with routine healing, subsequent encounter   Closed fracture of shaft of right fibula with routine healing, unspecified fracture morphology, subsequent encounter  Evaluation Date: 3/13/2024  Authorization Period Expiration: 12/31/2024  Plan of Care Expiration: 5/24/24  Progress Note Due: 30 days  Visit # / Visits authorized: 1/ 1   FOTO: 1/3  Precautions: Standard, status post ORIF of right tibia with intramedullary nail with DOS on 1/4/24. She may begin to partially weightbear as tolerated in the fracture boot.  We will start her in physical therapy at this time.  Patient may remove the boot for bathing/showering, but she understands that she has to wear the boot at all times while ambulating.    PTA Visit #: 1/5     Time In: 3:03pm  Time Out: 3:56pm  Total Time: 51 minutes    Subjective     Patient reports: pain most prevalent at night but at most gets to a 3/10. Patient reports ready to be out of the boot and walker.  She was compliant with home exercise program.  Response to previous treatment: n/a  Functional change: n/a    Pain: 2/10  Location: right leg      Objective      Objective Measures updated at progress report unless specified.     Treatment     Harrison received the treatments listed below:      therapeutic exercises to develop strength, endurance, ROM, and flexibility for 18 minutes including:  Bridges on wedges 3 x10  DKTC with ball; 2 x10  Supine hip adduction into ball; 5" hold 2 x10 - NT  sidelying Clams; 2 x10 - NT  Seated heel slides; 2 x10    manual therapy techniques: " "Myofacial release and Soft tissue Mobilization were applied to the: right lower extremity for 09 minutes, including:  Knee PROM  Ankle PROM  Soft Tissue Mobilization around patella  Patella Mobs  Effleurage    neuromuscular re-education activities to improve: Balance, Coordination, Kinesthetic, and Proprioception for 24 minutes. The following activities were included:  Quad sets; 10" x15  SAQ; 5" hold 2 x10  Seated heel slides; 2 x10  Seated Marches maintain core stability; 2 x10  PPT; 3 x10  Seated heel/toe raises; 3 x10    therapeutic activities to improve functional performance for 00  minutes, including:  Not performed    gait training to improve functional mobility and safety for 00  minutes, including:  Not performed    Patient Education and Home Exercises       Education provided:   - continuing to follow weightbearing precautions at this time    Written Home Exercises Provided: Patient instructed to cont prior HEP. Exercises were reviewed and Harrison was able to demonstrate them prior to the end of the session.  Harrison demonstrated good  understanding of the education provided. See Electronic Medical Record under Patient Instructions for exercises provided during therapy sessions    Assessment     Continued work of previous visit attempting to improve hip and lower extremity strength as well as mobility. Patient presents with swelling throughout right lower leg admitting to not elevating her legs over the last few days. Included manual interventions for edema and attempting to minimize complaints of clicking in knee during active extension. Patient demonstrated improvements in comfort with extension following manual. Will continue progression of non weightbearing activities as patient is able to tolerate.    Harveyda Is progressing well towards her goals.   Patient prognosis is Good.     Patient will continue to benefit from skilled outpatient physical therapy to address the deficits listed in the problem list " box on initial evaluation, provide pt/family education and to maximize pt's level of independence in the home and community environment.     Patient's spiritual, cultural and educational needs considered and pt agreeable to plan of care and goals.     Anticipated barriers to physical therapy: attendance     Goals:   STG's 2 weeks  Patient will be independent with 50% of HEP     LTG's 10 weeks  Patient will improve FOTO functional measure  score  to 65 %  in order to improve overall QOL & return to PLOF   2. Patient will report an overall decrease in pain with ADL's and functional mobility  3. Patient will increase strength by at least 1/2 muscle grade in affected musculature to   improve functional mobility  4. Patient will improve R knee and ankle ROM to improve functional mobility and ADL's  5. Patient will be more aware of posture throughout the day to reduce stress  and maintain optimal alignment of the spine  6. Patient will be independent with [facility]   Plan   Plan of care Certification: 3/13/2024 to 5/24/24.     Outpatient Physical Therapy 2-3  times weekly for 10 weeks to include the following interventions: Electrical Stimulation PRN, Gait Training, Manual Therapy, Moist Heat/ Ice, Neuromuscular Re-ed, Patient Education, Self Care, Therapeutic Activities, Therapeutic Exercise, and Ultrasound, ASTYM, Kinesiotaping PRN, Functional Dry Needling    Rohan Dominguez, PTA

## 2024-03-28 ENCOUNTER — CLINICAL SUPPORT (OUTPATIENT)
Dept: REHABILITATION | Facility: HOSPITAL | Age: 49
End: 2024-03-28
Payer: COMMERCIAL

## 2024-03-28 DIAGNOSIS — R53.1 WEAKNESS: ICD-10-CM

## 2024-03-28 DIAGNOSIS — R26.9 GAIT DIFFICULTY: ICD-10-CM

## 2024-03-28 DIAGNOSIS — R52 PAIN: Primary | ICD-10-CM

## 2024-03-28 PROCEDURE — 97112 NEUROMUSCULAR REEDUCATION: CPT | Mod: PN,CQ

## 2024-03-28 PROCEDURE — 97110 THERAPEUTIC EXERCISES: CPT | Mod: PN,CQ

## 2024-03-28 PROCEDURE — 97140 MANUAL THERAPY 1/> REGIONS: CPT | Mod: PN,CQ

## 2024-03-28 NOTE — PROGRESS NOTES
"[facility]   Physical Therapy Treatment Note      Name: Harrison Estevez  [facility] Number: [id number]    Therapy Diagnosis:   Encounter Diagnoses   Name Primary?    Pain Yes    Weakness     Gait difficulty      Physician: Mg Fiore PA-C    Visit Date: 3/28/2024    Physician Orders: PT Eval and Treat   Medical Diagnosis from Referral:  Closed displaced segmental fracture of shaft of right tibia with routine healing, subsequent encounter   Closed fracture of shaft of right fibula with routine healing, unspecified fracture morphology, subsequent encounter  Evaluation Date: 3/13/2024  Authorization Period Expiration: 12/31/2024  Plan of Care Expiration: 5/24/24  Progress Note Due: 30 days  Visit # / Visits authorized: 1/ 1 ; 3/30  FOTO: 1/3  Precautions: Standard, status post ORIF of right tibia with intramedullary nail with DOS on 1/4/24. She may begin to partially weightbear as tolerated in the fracture boot.  We will start her in physical therapy at this time.  Patient may remove the boot for bathing/showering, but she understands that she has to wear the boot at all times while ambulating.    PTA Visit #: 1/5     Time In: 3:31pm  Time Out: 4:30pm  Total Time: 31 minutes    Subjective     Patient reports: continuing to have aching pain in right knee and ankle mostly at night.   She was compliant with home exercise program.  Response to previous treatment: soreness  Functional change: n/a    Pain: 2/10  Location: right leg      Objective      Objective Measures updated at progress report unless specified.     Treatment     Harrison received the treatments listed below:      therapeutic exercises to develop strength, endurance, ROM, and flexibility for 10 minutes including:  Bridges on wedges 3 x10  DKTC with ball; 2 x10  Supine hip adduction into ball; 5" hold 2 x10 - NT  sidelying Clams; 2 x10 - NT  Seated heel slides; 2 x10    manual therapy techniques: Myofacial release and Soft tissue " "Mobilization were applied to the: right lower extremity for 09 minutes, including:  Knee PROM  Ankle PROM  Soft Tissue Mobilization around patella  Patella Mobs  Effleurage    neuromuscular re-education activities to improve: Balance, Coordination, Kinesthetic, and Proprioception for 12 minutes. The following activities were included:  Quad sets; 10" x15  SAQ; 5" hold 2 x10  Seated heel slides; 2 x10  Seated Marches maintain core stability; 2 x10  PPT; 3 x10  Seated heel/toe raises; 3 x10    therapeutic activities to improve functional performance for 00  minutes, including:  Not performed    gait training to improve functional mobility and safety for 00  minutes, including:  Not performed    Patient Education and Home Exercises       Education provided:   - continuing to follow weightbearing precautions at this time    Written Home Exercises Provided: Patient instructed to cont prior HEP. Exercises were reviewed and Harrison was able to demonstrate them prior to the end of the session.  Harrison demonstrated good  understanding of the education provided. See Electronic Medical Record under Patient Instructions for exercises provided during therapy sessions    Assessment     Treatment minimally progressed from last visit following patient's subjective reports of increased pain from walking a lot yesterday as well as no updates on weightbearing yet. Continued inclusion of manual in attempt to decrease edema present, improve ankle Range of Motion, and decrease lower extremity myofascial tension. Patient continues to demonstrate compliance with toe touch weightbearing in the boot with RW. Plan to continue progression of treatment when prescribed by MD. Terry Is progressing well towards her goals.   Patient prognosis is Good.     Patient will continue to benefit from skilled outpatient physical therapy to address the deficits listed in the problem list box on initial evaluation, provide pt/family education and to " maximize pt's level of independence in the home and community environment.     Patient's spiritual, cultural and educational needs considered and pt agreeable to plan of care and goals.     Anticipated barriers to physical therapy: attendance     Goals:   STG's 2 weeks  Patient will be independent with 50% of HEP     LTG's 10 weeks  Patient will improve FOTO functional measure  score  to 65 %  in order to improve overall QOL & return to PLOF   2. Patient will report an overall decrease in pain with ADL's and functional mobility  3. Patient will increase strength by at least 1/2 muscle grade in affected musculature to   improve functional mobility  4. Patient will improve R knee and ankle ROM to improve functional mobility and ADL's  5. Patient will be more aware of posture throughout the day to reduce stress  and maintain optimal alignment of the spine  6. Patient will be independent with HEP   Plan   Plan of care Certification: 3/13/2024 to 5/24/24.     Outpatient Physical Therapy 2-3  times weekly for 10 weeks to include the following interventions: Electrical Stimulation PRN, Gait Training, Manual Therapy, Moist Heat/ Ice, Neuromuscular Re-ed, Patient Education, Self Care, Therapeutic Activities, Therapeutic Exercise, and Ultrasound, ASTYM, Kinesiotaping PRN, Functional Dry Needling    Rohan Dominguez, PTA

## 2024-04-01 ENCOUNTER — HOSPITAL ENCOUNTER (OUTPATIENT)
Dept: RADIOLOGY | Facility: HOSPITAL | Age: 49
Discharge: HOME | End: 2024-04-01
Attending: PHYSICIAN ASSISTANT
Payer: COMMERCIAL

## 2024-04-01 ENCOUNTER — OFFICE VISIT (OUTPATIENT)
Dept: ORTHOPEDICS | Facility: CLINIC | Age: 49
End: 2024-04-01
Payer: COMMERCIAL

## 2024-04-01 VITALS
TEMPERATURE: 98 F | HEART RATE: 90 BPM | DIASTOLIC BLOOD PRESSURE: 75 MMHG | BODY MASS INDEX: 39.69 KG/M2 | HEIGHT: 63 IN | SYSTOLIC BLOOD PRESSURE: 113 MMHG | WEIGHT: 224 LBS

## 2024-04-01 DIAGNOSIS — S84.01XD: ICD-10-CM

## 2024-04-01 DIAGNOSIS — Z47.89 ORTHOPEDIC AFTERCARE: Primary | ICD-10-CM

## 2024-04-01 DIAGNOSIS — M79.661 PAIN OF RIGHT LOWER LEG: ICD-10-CM

## 2024-04-01 DIAGNOSIS — M79.661 PAIN OF RIGHT LOWER LEG: Primary | ICD-10-CM

## 2024-04-01 DIAGNOSIS — G57.31 RIGHT PERONEAL NERVE PALSY: ICD-10-CM

## 2024-04-01 DIAGNOSIS — S82.261D CLOSED DISPLACED SEGMENTAL FRACTURE OF SHAFT OF RIGHT TIBIA WITH ROUTINE HEALING, SUBSEQUENT ENCOUNTER: ICD-10-CM

## 2024-04-01 PROCEDURE — 3074F SYST BP LT 130 MM HG: CPT | Mod: CPTII,S$GLB,, | Performed by: ORTHOPAEDIC SURGERY

## 2024-04-01 PROCEDURE — 3078F DIAST BP <80 MM HG: CPT | Mod: CPTII,S$GLB,, | Performed by: ORTHOPAEDIC SURGERY

## 2024-04-01 PROCEDURE — 99999 PR PBB SHADOW E&M-EST. PATIENT-LVL III: CPT | Mod: PBBFAC,,, | Performed by: ORTHOPAEDIC SURGERY

## 2024-04-01 PROCEDURE — 1160F RVW MEDS BY RX/DR IN RCRD: CPT | Mod: CPTII,S$GLB,, | Performed by: ORTHOPAEDIC SURGERY

## 2024-04-01 PROCEDURE — 1159F MED LIST DOCD IN RCRD: CPT | Mod: CPTII,S$GLB,, | Performed by: ORTHOPAEDIC SURGERY

## 2024-04-01 PROCEDURE — 73590 X-RAY EXAM OF LOWER LEG: CPT | Mod: 26,RT,, | Performed by: RADIOLOGY

## 2024-04-01 PROCEDURE — 73590 X-RAY EXAM OF LOWER LEG: CPT | Mod: TC,RT

## 2024-04-01 PROCEDURE — 99024 POSTOP FOLLOW-UP VISIT: CPT | Mod: S$GLB,,, | Performed by: ORTHOPAEDIC SURGERY

## 2024-04-01 RX ORDER — NAPROXEN 500 MG/1
500 TABLET ORAL 2 TIMES DAILY
Qty: 60 TABLET | Refills: 1 | Status: SHIPPED | OUTPATIENT
Start: 2024-04-01 | End: 2024-05-31

## 2024-04-01 NOTE — PATIENT INSTRUCTIONS
You may wean out of the boot as tolerated.       *Do wear it if it is slippery/wet.    You may wean off of the walker as tolerated- but progress to a cane first.    You may need the boot/walker for longer expeditions.

## 2024-04-01 NOTE — PROGRESS NOTES
Date of Surgery     (2024)  Surgery                   RIGHT Segmental Tibia Fx IMN                                Chief Complaint: Pain of the Right Lower Leg      HPI: Harrison Estevez is a 48 y.o. female who is here for follow-up of her surgery.  She is accompanied by her family.    Today, the patient's pain is  minimal  .  She has been working with PT.  The nerve pain has nearly resolved.  She reports minimal pain with being 50% weight bearing.     She is having pain/clicking in her knee that she reports was there before the accident.     Past Medical History:   Diagnosis Date    Abnormal Pap smear 2011    Cryosurgery done    General anesthetics causing adverse effect in therapeutic use     slow to wake up following        Past Surgical History:   Procedure Laterality Date    ANKLE SURGERY      APPENDECTOMY      GYNECOLOGIC CRYOSURGERY  2011    INSERTION OF INTRAMEDULLARY NAIL INTO TIBIA Right 2024    Procedure: INSERTION, INTRAMEDULLARY JANIS, TIBIA;  Surgeon: Brenda Henson DO;  Location: [facility] OR;  Service: Orthopedics;  Laterality: Right;    ROBOT-ASSISTED LAPAROSCOPIC ABDOMINAL HYSTERECTOMY USING DA NICKY XI N/A 2023    Procedure: XI ROBOTIC HYSTERECTOMY;  Surgeon: BHUMI Shine MD;  Location: [facility] OR;  Service: OB/GYN;  Laterality: N/A;       Family History   Problem Relation Age of Onset    Breast cancer Maternal Grandmother     Breast cancer Maternal Aunt        Social History     Socioeconomic History    Marital status:    Tobacco Use    Smoking status: Never    Smokeless tobacco: Never   Substance and Sexual Activity    Alcohol use: Yes     Comment: occ; hold 72hrs    Drug use: No    Sexual activity: Yes     Partners: Male       Current Outpatient Medications   Medication Instructions    gabapentin (NEURONTIN) 300 mg, Oral, 3 times daily    naproxen (NAPROSYN) 500 mg, Oral, 2 times daily    oxyCODONE-acetaminophen (PERCOCET)  mg per tablet 1  tablet, Oral, Every 6 hours PRN    polyethylene glycol (GLYCOLAX) 17 g, Oral, Daily PRN       Review of patient's allergies indicates:   Allergen Reactions    Penicillanic sulfone bl beta-lactamase inhibitors Hives       Physical Exam:     Wt Readings from Last 1 Encounters:   04/01/24 101.6 kg (223 lb 15.8 oz)     Temp Readings from Last 1 Encounters:   04/01/24 97.5 °F (36.4 °C) (Oral)     BP Readings from Last 1 Encounters:   04/01/24 113/75     Pulse Readings from Last 1 Encounters:   04/01/24 90           General Appearance:   NAD, well appearing, cooperative    Neurologic:  Alert and oriented x3    Pysch:  Age appropriate    GAIT:  Mildly antalgic gait with assistive device     Musculoskeletal:     Right leg:  Incisions nicely healed.  No erythema. Swelling nearly resolved.  Ecchymosis resolved.  AROM of knee nearly normal.  Good AROM of ankle.  Sensation intact now throughout foot/calf.  EHL/toe extension 5/5.  Ankle plantar/dorsiflexion 5/5.                        IMAGING: RIGHT Tib/Fib XR  Hardware in good position.  Tibia fractures with good interval healing.  Fibula fracture with good interval healing.  No signs of loosening of hardware.    Assessment:       Encounter Diagnoses   Name Primary?    Orthopedic aftercare Yes    Closed displaced segmental fracture of shaft of right tibia with routine healing, subsequent encounter     Right peroneal nerve palsy     Injury of right tibial nerve, subsequent encounter               DISCUSSION:   Patient's symptoms, imaging, diagnosis and prognosis reviewed and discussed.  Discussed  healing progression.   Discussed weight bearing status and the progression Discussed the need for compliance with treatment.     Patient given an opportunity to ask questions.       Plan:       Harrison was seen today for pain.    Diagnoses and all orders for this visit:    Orthopedic aftercare    Closed displaced segmental fracture of shaft of right tibia with routine healing,  subsequent encounter  -     naproxen (NAPROSYN) 500 MG tablet; Take 1 tablet (500 mg total) by mouth 2 (two) times daily.    Right peroneal nerve palsy    Injury of right tibial nerve, subsequent encounter         Weight bearing:    Right leg As Tolerated   Precautions:  May wean out of boot and off of walker  Ice/elevate right leg to help decrease pain and swelling  Continue with PT  May return to work - see note for restrictions  Follow-up in 6 weeks     The patient understands, chooses and consents to this plan and accepts all   the risks which include but are not limited to the risks mentioned above.             Brenda Henson DO, CAQSM, [facility] [place]

## 2024-04-04 ENCOUNTER — CLINICAL SUPPORT (OUTPATIENT)
Dept: REHABILITATION | Facility: HOSPITAL | Age: 49
End: 2024-04-04
Payer: COMMERCIAL

## 2024-04-04 DIAGNOSIS — R52 PAIN: Primary | ICD-10-CM

## 2024-04-04 DIAGNOSIS — R53.1 WEAKNESS: ICD-10-CM

## 2024-04-04 DIAGNOSIS — R26.9 GAIT DIFFICULTY: ICD-10-CM

## 2024-04-04 PROCEDURE — 97112 NEUROMUSCULAR REEDUCATION: CPT | Mod: PN,CQ

## 2024-04-04 PROCEDURE — 97110 THERAPEUTIC EXERCISES: CPT | Mod: PN,CQ

## 2024-04-04 NOTE — PROGRESS NOTES
[facility]   Physical Therapy Treatment Note      Name: Harrison Estevez  Clinic Number: [id number]    Therapy Diagnosis:   Encounter Diagnoses   Name Primary?    Pain Yes    Weakness     Gait difficulty      Physician: Mg Fiore PA-C    Visit Date: 4/4/2024    Physician Orders: PT Eval and Treat   Medical Diagnosis from Referral:  Closed displaced segmental fracture of shaft of right tibia with routine healing, subsequent encounter   Closed fracture of shaft of right fibula with routine healing, unspecified fracture morphology, subsequent encounter  Evaluation Date: 3/13/2024  Authorization Period Expiration: 12/31/2024  Plan of Care Expiration: 5/24/24  Progress Note Due: 30 days  Visit # / Visits authorized: 1/ 1 ; 4/30  FOTO: 1/3  Precautions: Standard, status post ORIF of right tibia with intramedullary nail with DOS on 1/4/24. She may begin to partially weightbear as tolerated in the fracture boot.  We will start her in physical therapy at this time.  Patient may remove the boot for bathing/showering, but she understands that she has to wear the boot at all times while ambulating.    PTA Visit #: 3/5     Time In: 3:31pm  Time Out: 4:30pm  Total Time: 54 minutes    Subjective     Patient reports: seeing MD on Monday. Patient reports new Xrays show improvements in healing with no complications present. Patient also reports MD gave patient permission to wean out of the boot and walker as well as return to work part time with restricitons.  She was compliant with home exercise program.  Response to previous treatment: soreness  Functional change: n/a    Pain: 2/10  Location: right leg      Objective      Objective Measures updated at progress report unless specified.     Treatment     Harrison received the treatments listed below:      therapeutic exercises to develop strength, endurance, ROM, and flexibility for 14 minutes including:    Nustep 6 minutes no boot  Bridges on ball 3  "x10  DKTC with ball; 2 x10  Seated heel slides; 2 x10    manual therapy techniques: Myofacial release and Soft tissue Mobilization were applied to the: right lower extremity for 00 minutes, including:  Knee PROM  Ankle PROM  Soft Tissue Mobilization around patella  Patella Mobs  Effleurage    neuromuscular re-education activities to improve: Balance, Coordination, Kinesthetic, and Proprioception for 40 minutes. The following activities were included:      SAQ; 5" hold 2 x10  Seated heel slides; 2 x10  PPT; 3 x10  Seated heel/toe raises; 3 x10  BAPS fwd/back, side/side, circles cw/ccw; x20 each  SLR; 2 x10  Sit to Stands from hi lo in boot; 2 x10 (weights under Left Lower Extremity to balance height)  Standing weight shifts fwd/back and side/side; 2 minutes each    therapeutic activities to improve functional performance for 00  minutes, including:  Not performed    gait training to improve functional mobility and safety for 00  minutes, including:  Not performed    Patient Education and Home Exercises       Education provided:     Written Home Exercises Provided: Patient instructed to cont prior HEP. Exercises were reviewed and Harrison was able to demonstrate them prior to the end of the session.  Harveyda demonstrated good  understanding of the education provided. See Electronic Medical Record under Patient Instructions for exercises provided during therapy sessions    Assessment     Patient demonstrates antalgic gait ambulating with CAM boot and no rolling walker today. Discussed with patient about following MD recommendations to wean out of the boot and walker gradually but to be patient with progress over the next month to minimize potentially overloading healing tissue. Patient demonstrates gradually improving strength with hip/lower extremity strengthening activities but does continue to demonstrate fatigue following exercises. Patient also demonstrated hesitation with proper weight shifts forward and back into " and from Right Lower Extremity. Cueing provided for proper weight shift mechanics attempting to maintain terminal knee extension when shifting forward as well as allowing posterior lower extremity to flex and relax.    Harrison Is progressing well towards her goals.   Patient prognosis is Good.     Patient will continue to benefit from skilled outpatient physical therapy to address the deficits listed in the problem list box on initial evaluation, provide pt/family education and to maximize pt's level of independence in the home and community environment.     Patient's spiritual, cultural and educational needs considered and pt agreeable to plan of care and goals.     Anticipated barriers to physical therapy: attendance     Goals:   STG's 2 weeks  Patient will be independent with 50% of HEP     LTG's 10 weeks  Patient will improve FOTO functional measure  score  to 65 %  in order to improve overall QOL & return to PLOF   2. Patient will report an overall decrease in pain with ADL's and functional mobility  3. Patient will increase strength by at least 1/2 muscle grade in affected musculature to   improve functional mobility  4. Patient will improve R knee and ankle ROM to improve functional mobility and ADL's  5. Patient will be more aware of posture throughout the day to reduce stress  and maintain optimal alignment of the spine  6. Patient will be independent with HEP   Plan   Plan of care Certification: 3/13/2024 to 5/24/24.     Outpatient Physical Therapy 2-3  times weekly for 10 weeks to include the following interventions: Electrical Stimulation PRN, Gait Training, Manual Therapy, Moist Heat/ Ice, Neuromuscular Re-ed, Patient Education, Self Care, Therapeutic Activities, Therapeutic Exercise, and Ultrasound, ASTYM, Kinesiotaping PRN, Functional Dry Needling    Rohan Dominguez, PTA

## 2024-04-05 ENCOUNTER — CLINICAL SUPPORT (OUTPATIENT)
Dept: REHABILITATION | Facility: HOSPITAL | Age: 49
End: 2024-04-05
Payer: COMMERCIAL

## 2024-04-05 DIAGNOSIS — R52 PAIN: Primary | ICD-10-CM

## 2024-04-05 DIAGNOSIS — R53.1 WEAKNESS: ICD-10-CM

## 2024-04-05 DIAGNOSIS — R26.9 GAIT DIFFICULTY: ICD-10-CM

## 2024-04-05 PROCEDURE — 97112 NEUROMUSCULAR REEDUCATION: CPT | Mod: PN,CQ

## 2024-04-05 PROCEDURE — 97110 THERAPEUTIC EXERCISES: CPT | Mod: PN,CQ

## 2024-04-05 NOTE — PROGRESS NOTES
[facility]   Physical Therapy Treatment Note      Name: Harrison Estevez  [facility] Number: [id number]    Therapy Diagnosis:   Encounter Diagnoses   Name Primary?    Pain Yes    Weakness     Gait difficulty      Physician: Mg Fiore PA-C    Visit Date: 4/5/2024    Physician Orders: PT Eval and Treat   Medical Diagnosis from Referral:  Closed displaced segmental fracture of shaft of right tibia with routine healing, subsequent encounter   Closed fracture of shaft of right fibula with routine healing, unspecified fracture morphology, subsequent encounter  Evaluation Date: 3/13/2024  Authorization Period Expiration: 12/31/2024  Plan of Care Expiration: 5/24/24  Progress Note Due: 30 days  Visit # / Visits authorized: 1/ 1 ; 5/30  FOTO: 1/3  Precautions: Standard, status post ORIF of right tibia with intramedullary nail with DOS on 4/1/24. You may wean out of the boot as tolerated.       *Do wear it if it is slippery/wet.  You may wean off of the walker as tolerated- but progress to a cane first.  You may need the boot/walker for longer expeditions.     PTA Visit #: 5/5     Time In: 2:00pm  Time Out: 3:00pm  Total Time: 57 minutes    Subjective     Patient reports: a little soreness in Right Lower Extremity musculature after yesterday's visit but stated feeling better compared to before her visit.  She was compliant with home exercise program.  Response to previous treatment: soreness  Functional change: n/a    Pain: 2/10  Location: right leg      Objective      Objective Measures updated at progress report unless specified.     Treatment     Harrison received the treatments listed below:      therapeutic exercises to develop strength, endurance, ROM, and flexibility for 14 minutes including:    Nustep 6 minutes no boot  Bridges on ball 2 x10  DKTC with ball; 2 x10  Lower Trunk Rotation with ball; 2 x10  Seated heel slides; 2 x10    manual therapy techniques: Myofacial release and Soft tissue  Mobilization were applied to the: right lower extremity for 00 minutes, including:  Knee PROM  Ankle PROM  Soft Tissue Mobilization around patella  Patella Mobs  Effleurage    neuromuscular re-education activities to improve: Balance, Coordination, Kinesthetic, and Proprioception for 39 minutes. The following activities were included:  Seated heel/toe raises; 3 x10  BAPS fwd/back, side/side, circles cw/ccw; x20 each  Sit to Stands from hi lo in boot; 2 x10 (weights under Left Lower Extremity to balance height)  SLR; 3 x 6, right    Sidelying hip abduction; 3 x6, right    Sidelying Clams; 3 x 6, right        therapeutic activities to improve functional performance for 00  minutes, including:  Not performed    gait training to improve functional mobility and safety for 04  minutes, including:  Ambulating with CAM boot and standard cane    Patient Education and Home Exercises       Education provided:     Written Home Exercises Provided: Patient instructed to cont prior HEP. Exercises were reviewed and Harrison was able to demonstrate them prior to the end of the session.  Harrison demonstrated good  understanding of the education provided. See Electronic Medical Record under Patient Instructions for exercises provided during therapy sessions    Assessment     Continued work of previous visit attempting to improve lower extremity strength and endurance. Patient demonstrates improvements in gait mechanics ambulating with CAM boot compared to yesterday. Patient demonstrate endurance deficits with most lower extremity strengthening activities prescribed today. Plan to continue progression of treatment and improving weightbearing tolerance as patient is able to tolerate.    Harveyelroy Is progressing well towards her goals.   Patient prognosis is Good.     Patient will continue to benefit from skilled outpatient physical therapy to address the deficits listed in the problem list box on initial evaluation, provide pt/family  education and to maximize pt's level of independence in the home and community environment.     Patient's spiritual, cultural and educational needs considered and pt agreeable to plan of care and goals.     Anticipated barriers to physical therapy: attendance     Goals:   STG's 2 weeks  Patient will be independent with 50% of HEP     LTG's 10 weeks  Patient will improve FOTO functional measure  score  to 65 %  in order to improve overall QOL & return to PLOF   2. Patient will report an overall decrease in pain with ADL's and functional mobility  3. Patient will increase strength by at least 1/2 muscle grade in affected musculature to   improve functional mobility  4. Patient will improve R knee and ankle ROM to improve functional mobility and ADL's  5. Patient will be more aware of posture throughout the day to reduce stress  and maintain optimal alignment of the spine  6. Patient will be independent with [facility]   Plan   Plan of care Certification: 3/13/2024 to 5/24/24.     Outpatient Physical Therapy 2-3  times weekly for 10 weeks to include the following interventions: Electrical Stimulation PRN, Gait Training, Manual Therapy, Moist Heat/ Ice, Neuromuscular Re-ed, Patient Education, Self Care, Therapeutic Activities, Therapeutic Exercise, and Ultrasound, ASTYM, Kinesiotaping PRN, Functional Dry Needling    Rohanburke Dominguez, PTA

## 2024-04-10 ENCOUNTER — CLINICAL SUPPORT (OUTPATIENT)
Dept: REHABILITATION | Facility: HOSPITAL | Age: 49
End: 2024-04-10
Payer: COMMERCIAL

## 2024-04-10 DIAGNOSIS — R53.1 WEAKNESS: ICD-10-CM

## 2024-04-10 DIAGNOSIS — R26.9 GAIT DIFFICULTY: ICD-10-CM

## 2024-04-10 DIAGNOSIS — R52 PAIN: Primary | ICD-10-CM

## 2024-04-10 PROCEDURE — 97530 THERAPEUTIC ACTIVITIES: CPT | Mod: PN

## 2024-04-10 PROCEDURE — 97112 NEUROMUSCULAR REEDUCATION: CPT | Mod: PN

## 2024-04-10 PROCEDURE — 97110 THERAPEUTIC EXERCISES: CPT | Mod: PN

## 2024-04-10 NOTE — PROGRESS NOTES
[facility]   Physical Therapy Treatment Note      Name: Harrison Estevez  [facility] Number: [id number]    Therapy Diagnosis:   Encounter Diagnoses   Name Primary?    Pain Yes    Weakness     Gait difficulty        Physician: Mg Fiore PA-C    Visit Date: 4/10/2024    Physician Orders: PT Eval and Treat   Medical Diagnosis from Referral:  Closed displaced segmental fracture of shaft of right tibia with routine healing, subsequent encounter   Closed fracture of shaft of right fibula with routine healing, unspecified fracture morphology, subsequent encounter  Evaluation Date: 3/13/2024  Authorization Period Expiration: 12/31/2024  Plan of Care Expiration: 5/24/24  Progress Note Due: 30 days  Visit # / Visits authorized: 6/20 + eval  FOTO: 2/3  Precautions: Standard, status post ORIF of right tibia with intramedullary nail with DOS on 4/1/24. You may wean out of the boot as tolerated.       *Do wear it if it is slippery/wet.  You may wean off of the walker as tolerated- but progress to a cane first.  You may need the boot/walker for longer expeditions.     PTA Visit #: 0/5    Time In: 1:13 (patient late)  Time Out: 2:07  Total Time: 54 minutes    Subjective     Patient reports: she feel good today. Patient plans to return to work part time on 4/16/24. Patient is not allowed to wear walking boot to work.   She was compliant with home exercise program.  Response to previous treatment: good  Functional change: n/a    Pain: 2/10  Location: right leg      Objective    4/10/24 R knee AROM 0- 105  4/10/24 FOTO completed       Treatment     Harrison received the treatments listed below:      therapeutic exercises to develop strength, endurance, ROM, and flexibility for 10 minutes including:    Nustep 6 minutes no boot R: 4  Incline gastroc stretch 3 x 30 secs  R knee AROM    deferred  Bridges on ball 2 x10  DKTC with ball; 2 x10  Lower Trunk Rotation with ball; 2 x10  Seated heel slides; 2  x10    manual therapy techniques: Myofacial release and Soft tissue Mobilization were applied to the: right lower extremity for 00 minutes, including:  Knee PROM  Ankle PROM  Soft Tissue Mobilization around patella  Patella Mobs  Effleurage    neuromuscular re-education activities to improve: Balance, Coordination, Kinesthetic, and Proprioception for 36 minutes. The following activities were included:  Shuttle 4 bands DL/ 2 bands 1 minute x 2  R  Pre gait WS in //bars f/B 3' each leg/ s/s 3' with UE support   Ball in and outs 1 x 20 reps  Sitting knee flexion with assistance from L 10 x with a 10 sec hold        deferred  Seated heel/toe raises; 3 x10  BAPS fwd/back, side/side, circles cw/ccw; x20 each  Sit to Stands from hi lo in boot; 2 x10 (weights under Left Lower Extremity to balance height)  SLR; 3 x 6, right    Sidelying hip abduction; 3 x6, right    Sidelying Clams; 3 x 6, right        therapeutic activities to improve functional performance for 8 minutes, including:  FOTO completed and functional activities discussed  Transfers  Gait Short distances with HHA of PT  HEP reviewed including assisted knee flexion for increase R knee ROM    gait training to improve functional mobility and safety for 0  minutes, including:  Ambulating with CAM boot and standard cane    Patient Education and Home Exercises       Education provided:   HEP reviewed / sitting knee flexion  WB and progression      Written Home Exercises Provided: Patient instructed to cont prior HEP. Exercises were reviewed and Harrison was able to demonstrate them prior to the end of the session.  Harveyda demonstrated good  understanding of the education provided. See Electronic Medical Record under Patient Instructions for exercises provided during therapy sessions    Assessment   Patient is 3 months post op. She is responding well to treatment and is progressing with WBAT on R LE. PT working on weaning out of boot so patient can return to work. She is  not allowed to return to work with walking boot. She presents today with walking boot donned with no AD. She tolerated increase WB on R LE well with no complaints of pain only tightness. FOTO score is improving. R knee AROM 0- 105 degrees. PT will progress patient as tolerated.       Harrison Is progressing well towards her goals.   Patient prognosis is Good.     Patient will continue to benefit from skilled outpatient physical therapy to address the deficits listed in the problem list box on initial evaluation, provide pt/family education and to maximize pt's level of independence in the home and community environment.     Patient's spiritual, cultural and educational needs considered and pt agreeable to plan of care and goals.     Anticipated barriers to physical therapy: attendance     Goals:   STG's 2 weeks  Patient will be independent with 50% of HEP MET 4/10/24     LTG's 10 weeks  Patient will improve FOTO functional measure  score  to 65 %  in order to improve overall QOL & return to PLOF Progressing 44 4/10/24  2. Patient will report an overall decrease in pain with ADL's and functional mobility Progressing  3. Patient will increase strength by at least 1/2 muscle grade in affected musculature to improve functional mobility  4. Patient will improve R knee and ankle ROM to improve functional mobility and ACTIVITIES OF DAILY LIVING's Progressing slowly  5. Patient will be more aware of posture throughout the day to reduce stress  and maintain optimal alignment of the spine Progressing  6. Patient will be independent with HEP Progressing  Plan   Plan of care Certification: 3/13/2024 to 5/24/24.     Outpatient Physical Therapy 2-3  times weekly for 10 weeks to include the following interventions: Electrical Stimulation PRN, Gait Training, Manual Therapy, Moist Heat/ Ice, Neuromuscular Re-ed, Patient Education, Self Care, Therapeutic Activities, Therapeutic Exercise, and Ultrasound, ASTYM, Kinesiotaping PRN,  Functional Dry Needling    Rosa Hernandez, PT

## 2024-04-12 ENCOUNTER — CLINICAL SUPPORT (OUTPATIENT)
Dept: REHABILITATION | Facility: HOSPITAL | Age: 49
End: 2024-04-12
Payer: COMMERCIAL

## 2024-04-12 DIAGNOSIS — R26.9 GAIT DIFFICULTY: ICD-10-CM

## 2024-04-12 DIAGNOSIS — R52 PAIN: Primary | ICD-10-CM

## 2024-04-12 DIAGNOSIS — R53.1 WEAKNESS: ICD-10-CM

## 2024-04-12 PROCEDURE — 97116 GAIT TRAINING THERAPY: CPT | Mod: PN

## 2024-04-12 PROCEDURE — 97110 THERAPEUTIC EXERCISES: CPT | Mod: PN

## 2024-04-12 PROCEDURE — 97112 NEUROMUSCULAR REEDUCATION: CPT | Mod: PN

## 2024-04-12 NOTE — PROGRESS NOTES
[facility]   Physical Therapy Treatment Note      Name: Harrison Estevez  [facility] Number: [id number]    Therapy Diagnosis:   Encounter Diagnoses   Name Primary?    Pain Yes    Weakness     Gait difficulty        Physician: Mg Fiore PA-C    Visit Date: 4/12/2024    Physician Orders: PT Eval and Treat   Medical Diagnosis from Referral:  Closed displaced segmental fracture of shaft of right tibia with routine healing, subsequent encounter   Closed fracture of shaft of right fibula with routine healing, unspecified fracture morphology, subsequent encounter  Evaluation Date: 3/13/2024  Authorization Period Expiration: 12/31/2024  Plan of Care Expiration: 5/24/24  Progress Note Due: 30 days  Visit # / Visits authorized: 7/20 + eval  FOTO: 2/3  Precautions: Standard, status post ORIF of right tibia with intramedullary nail with DOS on 4/1/24. You may wean out of the boot as tolerated.       *Do wear it if it is slippery/wet.  You may wean off of the walker as tolerated- but progress to a cane first.  You may need the boot/walker for longer expeditions.     PTA Visit #: 0/5    Time In: 2:30  Time Out: 3:28  Total Time: 58 minutes    Subjective     Patient reports: she is feeling better.  Patient plans to return to work part time on 4/16/24. Patient is not allowed to wear walking boot to work. Patient reports no pain only stiffness.   She was compliant with home exercise program.  Response to previous treatment: good  Functional change: n/a    Pain: 0/10  Location: right leg      Objective    4/10/24 R knee AROM 0- 105  4/10/24 FOTO completed       Treatment     Harrison received the treatments listed below:      therapeutic exercises to develop strength, endurance, ROM, and flexibility for 14 minutes including:    Nustep 8 minutes no boot R: 4  Incline gastroc stretch 3 x 30 secs/ soleus stretch       deferred  Bridges on ball 2 x10  DKTC with ball; 2 x10  Lower Trunk Rotation with ball; 2  x10  Seated heel slides; 2 x10    manual therapy techniques: Myofacial release and Soft tissue Mobilization were applied to the: right lower extremity for 00 minutes, including:  Knee PROM  Ankle PROM  Soft Tissue Mobilization around patella  Patella Mobs  Effleurage    neuromuscular re-education activities to improve: Balance, Coordination, Kinesthetic, and Proprioception for 21 minutes. The following activities were included:  Shuttle 4 bands DL 3 minutes/ 2 bands 1 minute x 2  R  Standing heel raises 2 x 10 reps  Standing alternating marches with UE support 2 x 10 reps  Side stepping in // bars with UE support    deferred  Pre gait WS in //bars f/B 3' each leg/ s/s 3' with UE support   Ball in and outs 1 x 20 reps  Sitting knee flexion with assistance from L 10 x with a 10 sec hold      Seated heel/toe raises; 3 x10  BAPS fwd/back, side/side, circles cw/ccw; x20 each  Sit to Stands from hi lo in boot; 2 x10 (weights under Left Lower Extremity to balance height)  SLR; 3 x 6, right    Sidelying hip abduction; 3 x6, right    Sidelying Clams; 3 x 6, right        therapeutic activities to improve functional performance for 0  minutes, including:    Transfers  Gait Short distances with HHA of PT  HEP reviewed including assisted knee flexion for increase R knee ROM    gait training to improve functional mobility and safety for 23  minutes, including:  Gait training with SC with sequence and proper height  Gait training up and down steps using a step to pattern / 4 steps x 2 using B HR's      Patient Education and Home Exercises       Education provided:   HEP reviewed / sitting knee flexion  WB and progression  Gait with SC and negotiation of steps      Written Home Exercises Provided: Patient instructed to cont prior HEP. Exercises were reviewed and Harrison was able to demonstrate them prior to the end of the session.  Harrison demonstrated good  understanding of the education provided. See Electronic Medical Record under  Patient Instructions for exercises provided during therapy sessions    Assessment   Patient is 3 months post op. She presents to clinic walking in tennis shoes with a limp. She presents with swelling R LE. Gait training with SC with cuing for sequence. Gait training on how to negotiate steps with a step to pattern. She demonstrated and verbalized understanding. She tolerated treatment with mild difficulty.  PT will progress patient as tolerated.       Harveyda Is progressing well towards her goals.   Patient prognosis is Good.     Patient will continue to benefit from skilled outpatient physical therapy to address the deficits listed in the problem list box on initial evaluation, provide pt/family education and to maximize pt's level of independence in the home and community environment.     Patient's spiritual, cultural and educational needs considered and pt agreeable to plan of care and goals.     Anticipated barriers to physical therapy: attendance     Goals:   STG's 2 weeks  Patient will be independent with 50% of HEP MET 4/10/24     LTG's 10 weeks  Patient will improve FOTO functional measure  score  to 65 %  in order to improve overall QOL & return to PLOF Progressing 44 4/10/24  2. Patient will report an overall decrease in pain with ADL's and functional mobility Progressing  3. Patient will increase strength by at least 1/2 muscle grade in affected musculature to improve functional mobility  4. Patient will improve R knee and ankle ROM to improve functional mobility and ACTIVITIES OF DAILY LIVING's Progressing slowly  5. Patient will be more aware of posture throughout the day to reduce stress  and maintain optimal alignment of the spine Progressing  6. Patient will be independent with HEP Progressing  Plan   Plan of care Certification: 3/13/2024 to 5/24/24.     Outpatient Physical Therapy 2-3  times weekly for 10 weeks to include the following interventions: Electrical Stimulation PRN, Gait Training, Manual  Therapy, Moist Heat/ Ice, Neuromuscular Re-ed, Patient Education, Self Care, Therapeutic Activities, Therapeutic Exercise, and Ultrasound, ASTYM, Kinesiotaping PRN, Functional Dry Needling    Rosa Hernandez, PT

## 2024-04-15 ENCOUNTER — CLINICAL SUPPORT (OUTPATIENT)
Dept: REHABILITATION | Facility: HOSPITAL | Age: 49
End: 2024-04-15
Payer: COMMERCIAL

## 2024-04-15 DIAGNOSIS — R53.1 WEAKNESS: ICD-10-CM

## 2024-04-15 DIAGNOSIS — R52 PAIN: Primary | ICD-10-CM

## 2024-04-15 DIAGNOSIS — R26.9 GAIT DIFFICULTY: ICD-10-CM

## 2024-04-15 PROCEDURE — 97110 THERAPEUTIC EXERCISES: CPT | Mod: PN,CQ

## 2024-04-15 PROCEDURE — 97112 NEUROMUSCULAR REEDUCATION: CPT | Mod: PN,CQ

## 2024-04-15 PROCEDURE — 97140 MANUAL THERAPY 1/> REGIONS: CPT | Mod: PN,CQ

## 2024-04-15 NOTE — PROGRESS NOTES
[facility]   Physical Therapy Treatment Note      Name: Harrison Estevez  [facility] Number: [id number]    Therapy Diagnosis:   Encounter Diagnoses   Name Primary?    Pain Yes    Weakness     Gait difficulty        Physician: Mg Fiore PA-C    Visit Date: 4/15/2024    Physician Orders: PT Eval and Treat   Medical Diagnosis from Referral:  Closed displaced segmental fracture of shaft of right tibia with routine healing, subsequent encounter   Closed fracture of shaft of right fibula with routine healing, unspecified fracture morphology, subsequent encounter  Evaluation Date: 3/13/2024  Authorization Period Expiration: 12/31/2024  Plan of Care Expiration: 5/24/24  Progress Note Due: 30 days  Visit # / Visits authorized: 8/20 + eval  FOTO: 2/3  Precautions: Standard, status post ORIF of right tibia with intramedullary nail with DOS on 4/1/24. You may wean out of the boot as tolerated.       *Do wear it if it is slippery/wet.  You may wean off of the walker as tolerated- but progress to a cane first.  You may need the boot/walker for longer expeditions.     PTA Visit #: 1/5    Time In: 1:30pm  Time Out: 2:30pm  Total Time: 53 minutes    Subjective     Patient reports: being on her feet a lot of yesterday helping her mom out resulting in an increase in right knee pain. Patient reports really no complaints in lower leg. Patient reports returning to work limited tomorrow.   She was compliant with home exercise program.  Response to previous treatment: sore  Functional change: n/a    Pain: 0/10  Location: right leg      Objective    4/10/24 R knee AROM 0- 105  4/10/24 FOTO completed       Treatment     Harrison received the treatments listed below:      therapeutic exercises to develop strength, endurance, ROM, and flexibility for 14 minutes including:    Nustep 8 minutes no boot R: 4  Incline gastroc stretch 3 x 30 secs/ soleus stretch   Bridges 2 x10  DKTC with ball; 2 x10    manual therapy  techniques: Myofacial release and Soft tissue Mobilization were applied to the: right lower extremity for 11 minutes, including:  Knee PROM  Ankle PROM  Soft Tissue Mobilization around patella  Patella Mobs  Effleurage    neuromuscular re-education activities to improve: Balance, Coordination, Kinesthetic, and Proprioception for 28 minutes. The following activities were included:    Shuttle 4 bands DL 3 minutes/ 2 bands 1 minute x 2  R  Standing heel raises 2 x 10 reps  Standing alternating marches with UE support 2 x 10 reps  Side stepping in // bars with UE support  SLR; 2 x10  Sidelying hip abduction; 2 x10, right    Sidelying Clams; 2x10, right        therapeutic activities to improve functional performance for 0  minutes, including:      gait training to improve functional mobility and safety for 00  minutes, including:      Patient Education and Home Exercises       Education provided:   Written Home Exercises Provided: Patient instructed to cont prior HEP. Exercises were reviewed and Harrison was able to demonstrate them prior to the end of the session.  Harveyda demonstrated good  understanding of the education provided. See Electronic Medical Record under Patient Instructions for exercises provided during therapy sessions    Assessment     Patient continues to present with swelling in Right Lower Extremity primarily in knee and lower leg. Discussed with patient about potential benefits of compression or at least elevating bilateral lower extremities and potentially discuss with MD about compression. Continued limitations in knee and ankle Range of Motion present. Patient was able to complete all prescribed activities without limitations of pain being present.     Harrison Is progressing well towards her goals.   Patient prognosis is Good.     Patient will continue to benefit from skilled outpatient physical therapy to address the deficits listed in the problem list box on initial evaluation, provide pt/family  education and to maximize pt's level of independence in the home and community environment.     Patient's spiritual, cultural and educational needs considered and pt agreeable to plan of care and goals.     Anticipated barriers to physical therapy: attendance     Goals:   STG's 2 weeks  Patient will be independent with 50% of HEP MET 4/10/24     LTG's 10 weeks  Patient will improve FOTO functional measure  score  to 65 %  in order to improve overall QOL & return to PLOF Progressing 44 4/10/24  2. Patient will report an overall decrease in pain with ADL's and functional mobility Progressing  3. Patient will increase strength by at least 1/2 muscle grade in affected musculature to improve functional mobility  4. Patient will improve R knee and ankle ROM to improve functional mobility and ACTIVITIES OF DAILY LIVING's Progressing slowly  5. Patient will be more aware of posture throughout the day to reduce stress  and maintain optimal alignment of the spine Progressing  6. Patient will be independent with HEP Progressing  Plan   Plan of care Certification: 3/13/2024 to 5/24/24.     Outpatient Physical Therapy 2-3  times weekly for 10 weeks to include the following interventions: Electrical Stimulation PRN, Gait Training, Manual Therapy, Moist Heat/ Ice, Neuromuscular Re-ed, Patient Education, Self Care, Therapeutic Activities, Therapeutic Exercise, and Ultrasound, ASTYM, Kinesiotaping PRN, Functional Dry Needling    Rohan Dominguez, PTA

## 2024-04-17 ENCOUNTER — CLINICAL SUPPORT (OUTPATIENT)
Dept: REHABILITATION | Facility: HOSPITAL | Age: 49
End: 2024-04-17
Payer: COMMERCIAL

## 2024-04-17 DIAGNOSIS — R26.9 GAIT DIFFICULTY: ICD-10-CM

## 2024-04-17 DIAGNOSIS — R53.1 WEAKNESS: ICD-10-CM

## 2024-04-17 DIAGNOSIS — R52 PAIN: Primary | ICD-10-CM

## 2024-04-17 PROCEDURE — 97110 THERAPEUTIC EXERCISES: CPT | Mod: PN,CQ

## 2024-04-17 PROCEDURE — 97112 NEUROMUSCULAR REEDUCATION: CPT | Mod: PN,CQ

## 2024-04-17 PROCEDURE — 97530 THERAPEUTIC ACTIVITIES: CPT | Mod: PN,CQ

## 2024-04-17 NOTE — PROGRESS NOTES
[facility]   Physical Therapy Treatment Note      Name: Harrison Estevez  [facility] Number: [id number]    Therapy Diagnosis:   Encounter Diagnoses   Name Primary?    Pain Yes    Weakness     Gait difficulty        Physician: Mg Fiore PA-C    Visit Date: 4/17/2024    Physician Orders: PT Eval and Treat   Medical Diagnosis from Referral:  Closed displaced segmental fracture of shaft of right tibia with routine healing, subsequent encounter   Closed fracture of shaft of right fibula with routine healing, unspecified fracture morphology, subsequent encounter  Evaluation Date: 3/13/2024  Authorization Period Expiration: 12/31/2024  Plan of Care Expiration: 5/24/24  Progress Note Due: 30 days  Visit # / Visits authorized: 8/20 + eval  FOTO: 2/3  Precautions: Standard, status post ORIF of right tibia with intramedullary nail with DOS on 4/1/24. You may wean out of the boot as tolerated.       *Do wear it if it is slippery/wet.  You may wean off of the walker as tolerated- but progress to a cane first.  You may need the boot/walker for longer expeditions.     PTA Visit #: 1/5    Time In: 1:30pm  Time Out: 2:30pm  Total Time: 55 minutes    Subjective     Patient reports: going to work for the first time in months yesterday. Patient reports an increase in stiffness in right knee with discomfort when first getting up after sitting for a few hours. Patient also reports significant tiredness after and napped for 6 hours in the afternoon. Patient reports not getting any sleep last night as a result.   She was compliant with home exercise program.  Response to previous treatment: sore  Functional change: n/a    Pain: 0/10  Location: right leg      Objective    4/10/24 R knee AROM 0- 105  4/10/24 FOTO completed       Treatment     Harrison received the treatments listed below:      therapeutic exercises to develop strength, endurance, ROM, and flexibility for 10 minutes including:    Nustep 6 minutes no  "boot R: 4  Bridges 3 x10  DKTC with ball; 3 x10    manual therapy techniques: Myofacial release and Soft tissue Mobilization were applied to the: right lower extremity for 00 minutes, including:  Knee PROM  Ankle PROM  Soft Tissue Mobilization around patella  Patella Mobs  Effleurage    neuromuscular re-education activities to improve: Balance, Coordination, Kinesthetic, and Proprioception for 34 minutes. The following activities were included:    Shuttle 4 bands DL 3 minutes/ 2 bands, 2 x 1 minute R  Standing heel raises 2 x 10 reps   Standing alternating marches with UE support 2 x 10 reps  Side stepping in // bars with UE support  SLR; 2 x10 right   Sidelying hip abduction; 3 x8, right    Sidelying Clams; 3 x8 right    LAQ with strap assist; 3 x10 R      therapeutic activities to improve functional performance for 11 minutes, including:    Step ups at stiars; x15 each  Seated Heel Slides; 2 x10  Seated gastroc stretch with towel; 3 x20"  Standing wall stretch; 3 x20" gastroc     Patient Education and Home Exercises       Education provided:   Written Home Exercises Provided: Patient instructed to cont prior HEP. Exercises were reviewed and Harrison was able to demonstrate them prior to the end of the session.  Harveyda demonstrated good  understanding of the education provided. See Electronic Medical Record under Patient Instructions for exercises provided during therapy sessions    Assessment     Patient initially demonstrated difficulty with long arc quadriceps AROM requiring assistance from a strap to relieve clicking discomfort present. Patient continues to present with limited ankle dorsiflexion with tightness throughout. Patient initially demonstrated a clicking discomfort preventing full knee extension active Range of Motion. With assistance from a theraband strap patient gradually demonstrated improvements with decreasing limitations until she was successfully able to complete the activity without assistance " or limitations. Provided patient with additional home exercises to further improve ankle mobility and gastroc extensibility. Plan to continue progression of mobility and strengthening activities into greater function as patient is able to tolerate.    Harrison Is progressing well towards her goals.   Patient prognosis is Good.     Patient will continue to benefit from skilled outpatient physical therapy to address the deficits listed in the problem list box on initial evaluation, provide pt/family education and to maximize pt's level of independence in the home and community environment.     Patient's spiritual, cultural and educational needs considered and pt agreeable to plan of care and goals.     Anticipated barriers to physical therapy: attendance     Goals:   STG's 2 weeks  Patient will be independent with 50% of HEP MET 4/10/24     LTG's 10 weeks  Patient will improve FOTO functional measure  score  to 65 %  in order to improve overall QOL & return to PLOF Progressing 44 4/10/24  2. Patient will report an overall decrease in pain with ADL's and functional mobility Progressing  3. Patient will increase strength by at least 1/2 muscle grade in affected musculature to improve functional mobility  4. Patient will improve R knee and ankle ROM to improve functional mobility and ACTIVITIES OF DAILY LIVING's Progressing slowly  5. Patient will be more aware of posture throughout the day to reduce stress  and maintain optimal alignment of the spine Progressing  6. Patient will be independent with HEP Progressing  Plan   Plan of care Certification: 3/13/2024 to 5/24/24.     Outpatient Physical Therapy 2-3  times weekly for 10 weeks to include the following interventions: Electrical Stimulation PRN, Gait Training, Manual Therapy, Moist Heat/ Ice, Neuromuscular Re-ed, Patient Education, Self Care, Therapeutic Activities, Therapeutic Exercise, and Ultrasound, ASTYM, Kinesiotaping PRN, Functional Dry Needling    Rohan  Alberto, PTA

## 2024-04-19 ENCOUNTER — CLINICAL SUPPORT (OUTPATIENT)
Dept: REHABILITATION | Facility: HOSPITAL | Age: 49
End: 2024-04-19
Payer: COMMERCIAL

## 2024-04-19 DIAGNOSIS — R52 PAIN: Primary | ICD-10-CM

## 2024-04-19 DIAGNOSIS — R26.9 GAIT DIFFICULTY: ICD-10-CM

## 2024-04-19 DIAGNOSIS — R53.1 WEAKNESS: ICD-10-CM

## 2024-04-19 PROCEDURE — 97530 THERAPEUTIC ACTIVITIES: CPT | Mod: PN

## 2024-04-19 PROCEDURE — 97110 THERAPEUTIC EXERCISES: CPT | Mod: PN

## 2024-04-19 PROCEDURE — 97112 NEUROMUSCULAR REEDUCATION: CPT | Mod: PN

## 2024-04-19 NOTE — PROGRESS NOTES
[facility]   Physical Therapy Treatment Note      Name: Harrison Estevez  [facility] Number: [id number]    Therapy Diagnosis:   Encounter Diagnoses   Name Primary?    Pain Yes    Weakness     Gait difficulty        Physician: Mg Fiore PA-C    Visit Date: 4/19/2024    Physician Orders: PT Eval and Treat   Medical Diagnosis from Referral:  Closed displaced segmental fracture of shaft of right tibia with routine healing, subsequent encounter   Closed fracture of shaft of right fibula with routine healing, unspecified fracture morphology, subsequent encounter  Evaluation Date: 3/13/2024  Authorization Period Expiration: 12/31/2024  Plan of Care Expiration: 5/24/24  Progress Note Due: 30 days  Visit # / Visits authorized: 10/20 + eval  FOTO: 2/3  Precautions: Standard, status post ORIF of right tibia with intramedullary nail with DOS on 4/1/24. You may wean out of the boot as tolerated.       *Do wear it if it is slippery/wet.  You may wean off of the walker as tolerated- but progress to a cane first.  You may need the boot/walker for longer expeditions.     PTA Visit #: 0/5    Time In: 12:00  Time Out: 12:55  Total Time: 55 minutes    Subjective     Patient reports: she went back to work on 4/16/24. She reports she has been tired since getting back into the routine. Patient reports more shaking in R LE since starting work.  She was compliant with home exercise program.  Response to previous treatment: sore  Functional change: n/a    Pain: 0/10  Location: right leg      Objective    4/10/24 R knee AROM 0- 105  4/10/24 FOTO completed       Treatment     Harrison received the treatments listed below:      therapeutic exercises to develop strength, endurance, ROM, and flexibility for 12 minutes including:    Bike for ROM, strength, and endurance x 6 minutes  Incline gastroc stretch 3 x 30 secs/ soleus stretch     deferred  Bridges 3 x10  DKTC with ball; 3 x10    manual therapy techniques: Myofacial  release and Soft tissue Mobilization were applied to the: right lower extremity for 00 minutes, including:  Knee PROM  Ankle PROM  Soft Tissue Mobilization around patella  Patella Mobs  Effleurage    neuromuscular re-education activities to improve: Balance, Coordination, Kinesthetic, and Proprioception for 20 minutes. The following activities were included:  Wobble board in sitting 3 min f/ 3 min B  Standing heel raises 3 x 10 reps with no UE support  Shuttle 5 bands DL 3 minutes/ 3 bands, 2 x 1 minute R    deferred  Standing alternating marches with UE support 2 x 10 reps  Side stepping in // bars with UE support  SLR; 2 x10 right   Sidelying hip abduction; 3 x8, right    Sidelying Clams; 3 x8 right    LAQ with strap assist; 3 x10 R      therapeutic activities to improve functional performance for 23 minutes, including:  TRX mini squats 2 x 10 reps/ quivering R LE  Negotiation of 4 steps x 4 using HR's with a step to pattern  Transfers  Walking short distances with no AD    Patient Education and Home Exercises       Education provided:   Written Home Exercises Provided: Patient instructed to cont prior HEP. Exercises were reviewed and Harrison was able to demonstrate them prior to the end of the session.  Harrison demonstrated good  understanding of the education provided. See Electronic Medical Record under Patient Instructions for exercises provided during therapy sessions    Assessment   Patient presents to clinic with both shoes donned. She presents with no pain complaints walking with SC. TRX mini squats added today. Quivering in R LE noted during activity. Patient progressed with negotiation of steps using HR's with step to pattern. Patient progressing with resistance on shuttle today. PT will progress patient as tolerated.     Harrison Is progressing well towards her goals.   Patient prognosis is Good.     Patient will continue to benefit from skilled outpatient physical therapy to address the deficits listed in  the problem list box on initial evaluation, provide pt/family education and to maximize pt's level of independence in the home and community environment.     Patient's spiritual, cultural and educational needs considered and pt agreeable to plan of care and goals.     Anticipated barriers to physical therapy: attendance     Goals:   STG's 2 weeks  Patient will be independent with 50% of HEP MET 4/10/24     LTG's 10 weeks  Patient will improve FOTO functional measure  score  to 65 %  in order to improve overall QOL & return to PLOF Progressing 44 4/10/24  2. Patient will report an overall decrease in pain with ADL's and functional mobility Progressing  3. Patient will increase strength by at least 1/2 muscle grade in affected musculature to improve functional mobility  4. Patient will improve R knee and ankle ROM to improve functional mobility and ACTIVITIES OF DAILY LIVING's Progressing slowly  5. Patient will be more aware of posture throughout the day to reduce stress  and maintain optimal alignment of the spine Progressing  6. Patient will be independent with HEP Progressing  Plan   Plan of care Certification: 3/13/2024 to 5/24/24.     Outpatient Physical Therapy 2-3  times weekly for 10 weeks to include the following interventions: Electrical Stimulation PRN, Gait Training, Manual Therapy, Moist Heat/ Ice, Neuromuscular Re-ed, Patient Education, Self Care, Therapeutic Activities, Therapeutic Exercise, and Ultrasound, ASTYM, Kinesiotaping PRN, Functional Dry Needling    Rosa Hernandez, PT

## 2024-04-22 ENCOUNTER — TELEPHONE (OUTPATIENT)
Dept: OBSTETRICS AND GYNECOLOGY | Facility: CLINIC | Age: 49
End: 2024-04-22
Payer: COMMERCIAL

## 2024-04-22 DIAGNOSIS — Z12.31 SCREENING MAMMOGRAM, ENCOUNTER FOR: Primary | ICD-10-CM

## 2024-04-22 NOTE — TELEPHONE ENCOUNTER
Contacted patient regarding her wanting to schedule her routine mammo. Patient wanted [facility] location. Informed patient of available times and dates. Patient stated 5/14/24 at 3:20 pm at [facility] is a good day and time. Patient scheduled, patient verbalized understanding.

## 2024-04-22 NOTE — TELEPHONE ENCOUNTER
----- Message from Toña Maxwell sent at 4/22/2024  9:15 AM CDT -----  Contact: Harrison Terry is calling to speak to the nurse regarding scheduling her mammogram for this year, please give her a call to further assist at        Thanks  LJ

## 2024-04-26 ENCOUNTER — CLINICAL SUPPORT (OUTPATIENT)
Dept: REHABILITATION | Facility: HOSPITAL | Age: 49
End: 2024-04-26
Payer: COMMERCIAL

## 2024-04-26 DIAGNOSIS — R52 PAIN: Primary | ICD-10-CM

## 2024-04-26 DIAGNOSIS — R53.1 WEAKNESS: ICD-10-CM

## 2024-04-26 DIAGNOSIS — R26.9 GAIT DIFFICULTY: ICD-10-CM

## 2024-04-26 PROCEDURE — 97112 NEUROMUSCULAR REEDUCATION: CPT | Mod: PN

## 2024-04-26 PROCEDURE — 97110 THERAPEUTIC EXERCISES: CPT | Mod: PN

## 2024-04-26 PROCEDURE — 97530 THERAPEUTIC ACTIVITIES: CPT | Mod: PN

## 2024-04-26 NOTE — PROGRESS NOTES
[facility]   Physical Therapy Treatment Note      Name: Harrison Estevez  [facility] Number: [id number]    Therapy Diagnosis:   Encounter Diagnoses   Name Primary?    Pain Yes    Weakness     Gait difficulty        Physician: Mg Fiore PA-C    Visit Date: 4/26/2024    Physician Orders: PT Eval and Treat   Medical Diagnosis from Referral:  Closed displaced segmental fracture of shaft of right tibia with routine healing, subsequent encounter   Closed fracture of shaft of right fibula with routine healing, unspecified fracture morphology, subsequent encounter  Evaluation Date: 3/13/2024  Authorization Period Expiration: 12/31/2024  Plan of Care Expiration: 5/24/24  Progress Note Due: 30 days  Visit # / Visits authorized: 11/ 20 + eval  FOTO: 2/3  Precautions: Standard, status post ORIF of right tibia with intramedullary nail with DOS on 4/1/24. You may wean out of the boot as tolerated.       *Do wear it if it is slippery/wet.  You may wean off of the walker as tolerated- but progress to a cane first.  You may need the boot/walker for longer expeditions.     PTA Visit #: 0/5    Time In: 12:30  Time Out: 1:28  Total Time: 58 minutes    Subjective     Patient reports: her R foot has been bothering her since she started to wear compression garment on 4/22/24.   She was compliant with home exercise program.  Response to previous treatment: sore  Functional change: n/a    Pain: 4/10  Location: right foot      Objective    4/26/24 R knee AROM 0-115  4/10/24 R knee AROM 0- 105  4/10/24 FOTO completed       Treatment     Harrison received the treatments listed below:      therapeutic exercises to develop strength, endurance, ROM, and flexibility for 10  minutes including:    Bike for ROM, strength, and endurance x 6 minutes  Incline gastroc stretch 3 x 30 secs/ soleus stretch   AROM     deferred  Bridges 3 x10  DKTC with ball; 3 x10    manual therapy techniques: Myofacial release and Soft tissue  Mobilization were applied to the: right lower extremity for 00 minutes, including:  Knee PROM  Ankle PROM  Soft Tissue Mobilization around patella  Patella Mobs  Effleurage    neuromuscular re-education activities to improve: Balance, Coordination, Kinesthetic, and Proprioception for 23 minutes. The following activities were included:  Wobble board in sitting 3 min f/ 3 min B  Standing heel raises 3 x 10 reps with no UE support  Shuttle 5 bands DL 3 minutes/ 3 bands, 2 x 1 minute R  Standing alternating  walking marches with  no UE support in // bars    deferred    Side stepping in // bars with UE support  SLR; 2 x10 right   Sidelying hip abduction; 3 x8, right    Sidelying Clams; 3 x8 right    LAQ with strap assist; 3 x10 R      therapeutic activities to improve functional performance for 25 minutes, including:  Education on proper fit of compression garment and placement  TRX mini squats 2 x 10 reps/ quivering R LE  Negotiation of 4 steps x 4 using HR's with a step to pattern  Transfers      Patient Education and Home Exercises       Education provided:   Written Home Exercises Provided: Patient instructed to cont prior HEP. Exercises were reviewed and Harrison was able to demonstrate them prior to the end of the session.  Harrison demonstrated good  understanding of the education provided. See Electronic Medical Record under Patient Instructions for exercises provided during therapy sessions    Assessment   Patient presents to clinic with both shoes donned and ambulating with SC. Compression garment removed due to increase swelling into foot and pain. Patient negotiated a flight of steps with better form and increase ease with step to pattern using HR's for support. Patient is progressing with R knee AROM 0-115 degrees.  Patient responded well to treatment and reported decrease pain after her session. Referral for lymphedema evaluation requested.  PT will progress patient as tolerated.     Prenda Is progressing well  towards her goals.   Patient prognosis is Good.     Patient will continue to benefit from skilled outpatient physical therapy to address the deficits listed in the problem list box on initial evaluation, provide pt/family education and to maximize pt's level of independence in the home and community environment.     Patient's spiritual, cultural and educational needs considered and pt agreeable to plan of care and goals.     Anticipated barriers to physical therapy: attendance     Goals:   STG's 2 weeks  Patient will be independent with 50% of HEP MET 4/10/24     LTG's 10 weeks  Patient will improve FOTO functional measure  score  to 65 %  in order to improve overall QOL & return to PLOF Progressing 44 4/10/24  2. Patient will report an overall decrease in pain with ADL's and functional mobility Progressing  3. Patient will increase strength by at least 1/2 muscle grade in affected musculature to improve functional mobility  4. Patient will improve R knee and ankle ROM to improve functional mobility and ACTIVITIES OF DAILY LIVING's Progressing slowly  5. Patient will be more aware of posture throughout the day to reduce stress  and maintain optimal alignment of the spine Progressing  6. Patient will be independent with HEP Progressing  Plan   Plan of care Certification: 3/13/2024 to 5/24/24.     Outpatient Physical Therapy 2-3  times weekly for 10 weeks to include the following interventions: Electrical Stimulation PRN, Gait Training, Manual Therapy, Moist Heat/ Ice, Neuromuscular Re-ed, Patient Education, Self Care, Therapeutic Activities, Therapeutic Exercise, and Ultrasound, ASTYM, Kinesiotaping PRN, Functional Dry Needling    Rosa Henrandez, PT

## 2024-05-01 ENCOUNTER — CLINICAL SUPPORT (OUTPATIENT)
Dept: REHABILITATION | Facility: HOSPITAL | Age: 49
End: 2024-05-01
Payer: COMMERCIAL

## 2024-05-01 DIAGNOSIS — R52 PAIN: Primary | ICD-10-CM

## 2024-05-01 DIAGNOSIS — R26.9 GAIT DIFFICULTY: ICD-10-CM

## 2024-05-01 DIAGNOSIS — R53.1 WEAKNESS: ICD-10-CM

## 2024-05-01 PROCEDURE — 97110 THERAPEUTIC EXERCISES: CPT | Mod: PN,CQ

## 2024-05-01 PROCEDURE — 97112 NEUROMUSCULAR REEDUCATION: CPT | Mod: PN,CQ

## 2024-05-01 NOTE — PROGRESS NOTES
[facility]   Physical Therapy Treatment Note      Name: Harrison Estevez  [facility] Number: [id number]    Therapy Diagnosis:   Encounter Diagnoses   Name Primary?    Pain Yes    Weakness     Gait difficulty        Physician: Mg Fiore PA-C    Visit Date: 5/1/2024    Physician Orders: PT Eval and Treat   Medical Diagnosis from Referral:  Closed displaced segmental fracture of shaft of right tibia with routine healing, subsequent encounter   Closed fracture of shaft of right fibula with routine healing, unspecified fracture morphology, subsequent encounter  Evaluation Date: 3/13/2024  Authorization Period Expiration: 12/31/2024  Plan of Care Expiration: 5/24/24  Progress Note Due: 30 days  Visit # / Visits authorized: 12/ 20 + eval  FOTO: 2/3  Precautions: Standard, status post ORIF of right tibia with intramedullary nail with DOS on 4/1/24. You may wean out of the boot as tolerated.       *Do wear it if it is slippery/wet.  You may wean off of the walker as tolerated- but progress to a cane first.  You may need the boot/walker for longer expeditions.     PTA Visit #: 1/5    Time In: 3:05pm  Time Out: 4:05pm  Total Time: 55 minutes    Subjective     Patient reports: been on her feet and walking a lot recently resulting in an increase in ankle/foot pain. Patient reports not hearing anything about compression sleeve/brace for the knee yet.  She was compliant with home exercise program.  Response to previous treatment: sore  Functional change: n/a    Pain: 4/10  Location: right foot      Objective    4/26/24 R knee AROM 0-115  4/10/24 R knee AROM 0- 105  4/10/24 FOTO completed       Treatment     Harrison received the treatments listed below:      therapeutic exercises to develop strength, endurance, ROM, and flexibility for 23 minutes including:    Bike for ROM, strength, and endurance x 6 minutes  Incline gastroc stretch 3 x 30 secs/ soleus stretch   Forward lunge to stairs for ankle DF 10  "x5"  DKTC; 2 x10  LAQ; 3# 2 x10    manual therapy techniques: Myofacial release and Soft tissue Mobilization were applied to the: right lower extremity for 00 minutes, including:  Knee PROM  Ankle PROM  Soft Tissue Mobilization around patella  Patella Mobs  Effleurage    neuromuscular re-education activities to improve: Balance, Coordination, Kinesthetic, and Proprioception for 32 minutes. The following activities were included:    Wobble board in sitting 2 min fwd/back  Standing heel raises 3 x 10 reps with no UE support  Shuttle 5 bands DL 3 minutes/ 3 bands, 2 x 1 minute R  Walking Marches; 3 laps in // bars  BAPs fwd/back, side/side, cw/ccw; 2 x10 each  SLR; 2 x10 right     therapeutic activities to improve functional performance for 00 minutes, including:    TRX mini squats 2 x 10 reps/ quivering R LE  Negotiation of 4 steps x 4 using HR's with a step to pattern      Patient Education and Home Exercises       Education provided:   Written Home Exercises Provided: Patient instructed to cont prior HEP. Exercises were reviewed and Harrison was able to demonstrate them prior to the end of the session.  Harrison demonstrated good  understanding of the education provided. See Electronic Medical Record under Patient Instructions for exercises provided during therapy sessions    Assessment     Continued focus of treatment attempting to improve knee and ankle Range of Motion, strength, as well as tolerance to activity. Patient demonstrates antalgic gait with use of standard cane while weightbearing through Right Lower Extremity. Discussed with patient about the importance of proper mechanics and potentially decreasing marlena and step length to assist with form. Patient demonstrate difficulty with ankle mobility activities as well as Right quadriceps strengthening activities. Discussed with patient about the importance of consistency with home exercises attempting to improve strength and mobility, but also monitoring " exacerbations of pain     Harveyda Is progressing well towards her goals.   Patient prognosis is Good.     Patient will continue to benefit from skilled outpatient physical therapy to address the deficits listed in the problem list box on initial evaluation, provide pt/family education and to maximize pt's level of independence in the home and community environment.     Patient's spiritual, cultural and educational needs considered and pt agreeable to plan of care and goals.     Anticipated barriers to physical therapy: attendance     Goals:   STG's 2 weeks  Patient will be independent with 50% of HEP MET 4/10/24     LTG's 10 weeks  Patient will improve FOTO functional measure  score  to 65 %  in order to improve overall QOL & return to PLOF Progressing 44 4/10/24  2. Patient will report an overall decrease in pain with ADL's and functional mobility Progressing  3. Patient will increase strength by at least 1/2 muscle grade in affected musculature to improve functional mobility  4. Patient will improve R knee and ankle ROM to improve functional mobility and ACTIVITIES OF DAILY LIVING's Progressing slowly  5. Patient will be more aware of posture throughout the day to reduce stress  and maintain optimal alignment of the spine Progressing  6. Patient will be independent with HEP Progressing  Plan   Plan of care Certification: 3/13/2024 to 5/24/24.     Outpatient Physical Therapy 2-3  times weekly for 10 weeks to include the following interventions: Electrical Stimulation PRN, Gait Training, Manual Therapy, Moist Heat/ Ice, Neuromuscular Re-ed, Patient Education, Self Care, Therapeutic Activities, Therapeutic Exercise, and Ultrasound, ASTYM, Kinesiotaping PRN, Functional Dry Needling    Rohan Dominguez, PTA

## 2024-05-06 DIAGNOSIS — I89.0 LYMPHEDEMA: Primary | ICD-10-CM

## 2024-05-09 ENCOUNTER — CLINICAL SUPPORT (OUTPATIENT)
Dept: REHABILITATION | Facility: HOSPITAL | Age: 49
End: 2024-05-09
Payer: COMMERCIAL

## 2024-05-09 DIAGNOSIS — R26.9 GAIT DIFFICULTY: ICD-10-CM

## 2024-05-09 DIAGNOSIS — R53.1 WEAKNESS: ICD-10-CM

## 2024-05-09 DIAGNOSIS — R52 PAIN: Primary | ICD-10-CM

## 2024-05-09 PROCEDURE — 97112 NEUROMUSCULAR REEDUCATION: CPT | Mod: PN,CQ

## 2024-05-09 PROCEDURE — 97530 THERAPEUTIC ACTIVITIES: CPT | Mod: PN,CQ

## 2024-05-09 PROCEDURE — 97110 THERAPEUTIC EXERCISES: CPT | Mod: PN,CQ

## 2024-05-09 NOTE — PROGRESS NOTES
[facility]   Physical Therapy Treatment Note      Name: Harrison Estevez  [facility] Number: [id number]    Therapy Diagnosis:   Encounter Diagnoses   Name Primary?    Pain Yes    Weakness     Gait difficulty        Physician: Mg Fiore PA-C    Visit Date: 5/9/2024    Physician Orders: PT Eval and Treat   Medical Diagnosis from Referral:  Closed displaced segmental fracture of shaft of right tibia with routine healing, subsequent encounter   Closed fracture of shaft of right fibula with routine healing, unspecified fracture morphology, subsequent encounter  Evaluation Date: 3/13/2024  Authorization Period Expiration: 12/31/2024  Plan of Care Expiration: 5/24/24  Progress Note Due: 30 days  Visit # / Visits authorized: 13/ 20 + eval  FOTO: 2/3  Precautions: Standard, status post ORIF of right tibia with intramedullary nail with DOS on 4/1/24. You may wean out of the boot as tolerated.       *Do wear it if it is slippery/wet.  You may wean off of the walker as tolerated- but progress to a cane first.  You may need the boot/walker for longer expeditions.     PTA Visit #: 2/5    Time In: 12:30pm  Time Out: 1:30pm  Total Time: 30 minutes    Subjective     Patient reports: continuing to have primary complaints in right foot. Patient reports her right hip and right knee have not been giving her any limitations. Patient reports since she has been getting back to taking care of her mother she has not really had any days of rest and is always on her feet.   She was compliant with home exercise program.  Response to previous treatment: sore  Functional change: n/a    Pain: 4/10  Location: right foot      Objective    4/26/24 R knee AROM 0-115  4/10/24 R knee AROM 0- 105  4/10/24 FOTO completed       Treatment     Harrison received the treatments listed below:      therapeutic exercises to develop strength, endurance, ROM, and flexibility for 08 minutes including:    Bike for ROM, strength, and endurance  "x 6 minutes  Incline gastroc stretch 3 x 30 secs/ soleus stretch   Forward lunge to stairs for ankle DF 10 x5"  LAQ; 5# 2 x10    manual therapy techniques: Myofacial release and Soft tissue Mobilization were applied to the: right lower extremity for 00 minutes, including:  Knee PROM  Ankle PROM  Soft Tissue Mobilization around patella  Patella Mobs  Effleurage    neuromuscular re-education activities to improve: Balance, Coordination, Kinesthetic, and Proprioception for 10 minutes. The following activities were included:    Wobble board in sitting 2 min fwd/back  Shuttle DL 5 bands 3 minutes/  Shuttle SL 3 bands, 2 x 1 minute R  Walking Marches; 3 laps in // bars  BAPs fwd/back, side/side, cw/ccw; 2 x10 each  SLR; 2 x12    therapeutic activities to improve functional performance for 12 minutes, including:    Additional HEP:  Toe Yoga  Toe Crunches  Toe Splaying  Short Foot  Heel/Toe Raises  Ankle Inv/EV    Patient Education and Home Exercises       Education provided:   Written Home Exercises Provided: Patient instructed to cont prior HEP. Exercises were reviewed and Harrison was able to demonstrate them prior to the end of the session.  Harveyda demonstrated good  understanding of the education provided. See Electronic Medical Record under Patient Instructions for exercises provided during therapy sessions    Assessment     Discussed with patient again about importance of implementing rest breaks throughout the day in attempt to minimize exacerbations of subjective foot pain. Patient was provided with foot intrinsic/extrinsic as well as ankle strengthening activities to perform daily as well as for gradual strengthening. Patient verbally stated understanding of compliance with HEP for proper duration and frequency. Patient continues to demonstrate weakness of hip musculature but is able to complete more exercises with decreased fatigue. Patient continues to present with swelling in lower leg and foot. Patient has an " upcoming appointment with Physical Therapist and lymphedema expert Carol Samayoa next visit.    Harrison Is progressing well towards her goals.   Patient prognosis is Good.     Patient will continue to benefit from skilled outpatient physical therapy to address the deficits listed in the problem list box on initial evaluation, provide pt/family education and to maximize pt's level of independence in the home and community environment.     Patient's spiritual, cultural and educational needs considered and pt agreeable to plan of care and goals.     Anticipated barriers to physical therapy: attendance     Goals:   STG's 2 weeks  Patient will be independent with 50% of HEP MET 4/10/24     LTG's 10 weeks  Patient will improve FOTO functional measure  score  to 65 %  in order to improve overall QOL & return to PLOF Progressing 44 4/10/24  2. Patient will report an overall decrease in pain with ADL's and functional mobility Progressing  3. Patient will increase strength by at least 1/2 muscle grade in affected musculature to improve functional mobility  4. Patient will improve R knee and ankle ROM to improve functional mobility and ACTIVITIES OF DAILY LIVING's Progressing slowly  5. Patient will be more aware of posture throughout the day to reduce stress  and maintain optimal alignment of the spine Progressing  6. Patient will be independent with HEP Progressing  Plan   Plan of care Certification: 3/13/2024 to 5/24/24.     Outpatient Physical Therapy 2-3  times weekly for 10 weeks to include the following interventions: Electrical Stimulation PRN, Gait Training, Manual Therapy, Moist Heat/ Ice, Neuromuscular Re-ed, Patient Education, Self Care, Therapeutic Activities, Therapeutic Exercise, and Ultrasound, ASTYM, Kinesiotaping PRN, Functional Dry Needling    Rohan Dominguez, PTA

## 2024-05-13 ENCOUNTER — HOSPITAL ENCOUNTER (OUTPATIENT)
Dept: RADIOLOGY | Facility: HOSPITAL | Age: 49
Discharge: HOME | End: 2024-05-13
Attending: ORTHOPAEDIC SURGERY
Payer: COMMERCIAL

## 2024-05-13 ENCOUNTER — OFFICE VISIT (OUTPATIENT)
Dept: ORTHOPEDICS | Facility: CLINIC | Age: 49
End: 2024-05-13
Payer: COMMERCIAL

## 2024-05-13 ENCOUNTER — CLINICAL SUPPORT (OUTPATIENT)
Dept: REHABILITATION | Facility: HOSPITAL | Age: 49
End: 2024-05-13
Attending: ORTHOPAEDIC SURGERY
Payer: COMMERCIAL

## 2024-05-13 VITALS
HEIGHT: 63 IN | TEMPERATURE: 98 F | SYSTOLIC BLOOD PRESSURE: 119 MMHG | DIASTOLIC BLOOD PRESSURE: 72 MMHG | HEART RATE: 71 BPM | WEIGHT: 224 LBS | BODY MASS INDEX: 39.69 KG/M2

## 2024-05-13 DIAGNOSIS — R60.9 LIPEDEMA: Primary | ICD-10-CM

## 2024-05-13 DIAGNOSIS — G57.31 RIGHT PERONEAL NERVE PALSY: ICD-10-CM

## 2024-05-13 DIAGNOSIS — S84.01XD: ICD-10-CM

## 2024-05-13 DIAGNOSIS — S82.261D CLOSED DISPLACED SEGMENTAL FRACTURE OF SHAFT OF RIGHT TIBIA WITH ROUTINE HEALING, SUBSEQUENT ENCOUNTER: Primary | ICD-10-CM

## 2024-05-13 DIAGNOSIS — I89.0 LYMPHEDEMA: ICD-10-CM

## 2024-05-13 DIAGNOSIS — I89.0 LYMPHEDEMA OF RIGHT LOWER EXTREMITY: ICD-10-CM

## 2024-05-13 DIAGNOSIS — M79.661 PAIN OF RIGHT LOWER LEG: ICD-10-CM

## 2024-05-13 DIAGNOSIS — S82.401D CLOSED FRACTURE OF SHAFT OF RIGHT FIBULA WITH ROUTINE HEALING, UNSPECIFIED FRACTURE MORPHOLOGY, SUBSEQUENT ENCOUNTER: ICD-10-CM

## 2024-05-13 DIAGNOSIS — M79.661 PAIN IN RIGHT LOWER LEG: ICD-10-CM

## 2024-05-13 PROCEDURE — 99999 PR PBB SHADOW E&M-EST. PATIENT-LVL III: CPT | Mod: PBBFAC,,, | Performed by: ORTHOPAEDIC SURGERY

## 2024-05-13 PROCEDURE — 3074F SYST BP LT 130 MM HG: CPT | Mod: CPTII,S$GLB,, | Performed by: ORTHOPAEDIC SURGERY

## 2024-05-13 PROCEDURE — 1159F MED LIST DOCD IN RCRD: CPT | Mod: CPTII,S$GLB,, | Performed by: ORTHOPAEDIC SURGERY

## 2024-05-13 PROCEDURE — 73590 X-RAY EXAM OF LOWER LEG: CPT | Mod: TC,RT

## 2024-05-13 PROCEDURE — 3078F DIAST BP <80 MM HG: CPT | Mod: CPTII,S$GLB,, | Performed by: ORTHOPAEDIC SURGERY

## 2024-05-13 PROCEDURE — 97161 PT EVAL LOW COMPLEX 20 MIN: CPT | Mod: PN

## 2024-05-13 PROCEDURE — 97535 SELF CARE MNGMENT TRAINING: CPT | Mod: PN

## 2024-05-13 PROCEDURE — 3008F BODY MASS INDEX DOCD: CPT | Mod: CPTII,S$GLB,, | Performed by: ORTHOPAEDIC SURGERY

## 2024-05-13 PROCEDURE — 73590 X-RAY EXAM OF LOWER LEG: CPT | Mod: 26,RT,, | Performed by: RADIOLOGY

## 2024-05-13 PROCEDURE — 99213 OFFICE O/P EST LOW 20 MIN: CPT | Mod: S$GLB,,, | Performed by: ORTHOPAEDIC SURGERY

## 2024-05-13 PROCEDURE — 1160F RVW MEDS BY RX/DR IN RCRD: CPT | Mod: CPTII,S$GLB,, | Performed by: ORTHOPAEDIC SURGERY
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
## 2024-05-14 ENCOUNTER — HOSPITAL ENCOUNTER (OUTPATIENT)
Dept: RADIOLOGY | Facility: HOSPITAL | Age: 49
Discharge: HOME | End: 2024-05-14
Attending: OBSTETRICS & GYNECOLOGY
Payer: COMMERCIAL

## 2024-05-14 VITALS — HEIGHT: 63 IN | BODY MASS INDEX: 39.69 KG/M2 | WEIGHT: 224 LBS

## 2024-05-14 DIAGNOSIS — Z12.31 SCREENING MAMMOGRAM, ENCOUNTER FOR: ICD-10-CM

## 2024-05-14 PROCEDURE — 77067 SCR MAMMO BI INCL CAD: CPT | Mod: 26,,, | Performed by: RADIOLOGY

## 2024-05-14 PROCEDURE — 77067 SCR MAMMO BI INCL CAD: CPT | Mod: TC

## 2024-05-14 PROCEDURE — 77063 BREAST TOMOSYNTHESIS BI: CPT | Mod: 26,,, | Performed by: RADIOLOGY

## 2024-05-14 PROCEDURE — 77063 BREAST TOMOSYNTHESIS BI: CPT | Mod: TC

## 2024-05-15 NOTE — PROGRESS NOTES
Date of Surgery     (2024)  Surgery                   RIGHT Tibia IMN                                Chief Complaint: Pain of the Right Lower Leg      HPI: Harrison Estevez is a 48 y.o.  female who is here for follow-up of her  surgery.       Today, the patient's pain is mild .  The swelling is still bothering her.  The nerve pain is still hurting at night but she stopped the gabapentin.    She is working with PT.    Past Medical History:   Diagnosis Date    Abnormal Pap smear 2011    Cryosurgery done    General anesthetics causing adverse effect in therapeutic use     slow to wake up following        Past Surgical History:   Procedure Laterality Date    ANKLE SURGERY      APPENDECTOMY      GYNECOLOGIC CRYOSURGERY  2011    HYSTERECTOMY      INSERTION OF INTRAMEDULLARY NAIL INTO TIBIA Right 2024    Procedure: INSERTION, INTRAMEDULLARY JANIS, TIBIA;  Surgeon: Brenda Henson DO;  Location: [facility] OR;  Service: Orthopedics;  Laterality: Right;    OOPHORECTOMY      ROBOT-ASSISTED LAPAROSCOPIC ABDOMINAL HYSTERECTOMY USING DA NICKY XI N/A 2023    Procedure: XI ROBOTIC HYSTERECTOMY;  Surgeon: BHUMI Shine MD;  Location: [facility] OR;  Service: OB/GYN;  Laterality: N/A;       Family History   Problem Relation Name Age of Onset    Breast cancer Maternal Grandmother      Breast cancer Maternal Aunt         Social History     Socioeconomic History    Marital status:    Tobacco Use    Smoking status: Never    Smokeless tobacco: Never   Substance and Sexual Activity    Alcohol use: Yes     Comment: occ    Drug use: No    Sexual activity: Yes     Partners: Male       Current Outpatient Medications   Medication Instructions    naproxen (NAPROSYN) 500 mg, Oral, 2 times daily       Review of patient's allergies indicates:   Allergen Reactions    Penicillanic sulfone bl beta-lactamase inhibitors Hives    Penicillins        Physical Exam:     Wt Readings from Last 1 Encounters:    05/14/24 101.6 kg (223 lb 15.8 oz)     Temp Readings from Last 1 Encounters:   05/13/24 98.2 °F (36.8 °C) (Oral)     BP Readings from Last 1 Encounters:   05/13/24 119/72     Pulse Readings from Last 1 Encounters:   05/13/24 71           General Appearance:   NAD, well appearing, cooperative    Neurologic:  Alert and oriented x3    Pysch:  Age appropriate    GAIT:  Mildly antalgic gait with assistive device       Musculoskeletal:     Right leg:  Incisions nicely healed.  No erythema. Swelling moderate but as expected.  Ecchymosis resolved.  AROM of knee is full.  Good AROM of ankle.  Ankle plantar/dorsiflexion 5/5.  Toe extension 4+/5.  Toe flexion 5/5.  Distal neurovascular status intact.                        IMAGING: RIGHT Tibia AP/Lat  IMN in place.  Tibia fractures healed.  Proximal fibula fracture healed.  Distal shaft fibula fracture nearly healed.     Assessment:       Encounter Diagnoses   Name Primary?    Closed displaced segmental fracture of shaft of right tibia with routine healing, subsequent encounter Yes    Right peroneal nerve palsy     Injury of right tibial nerve, subsequent encounter     Closed fracture of shaft of right fibula with routine healing, unspecified fracture morphology, subsequent encounter     Lymphedema               DISCUSSION:   Patient's symptoms, imaging, diagnosis and prognosis reviewed and discussed.  Discussed  healing progression.   Discussed weight bearing status and the progression Discussed the need for compliance with treatment.     Patient given an opportunity to ask questions.       Plan:       Harrison was seen today for pain.    Diagnoses and all orders for this visit:    Closed displaced segmental fracture of shaft of right tibia with routine healing, subsequent encounter    Right peroneal nerve palsy    Injury of right tibial nerve, subsequent encounter    Closed fracture of shaft of right fibula with routine healing, unspecified fracture morphology, subsequent  encounter    Lymphedema         Start lymphedema therapy as ordered  Continue with regular therapy  Weight bearing:    Right leg As Tolerated   Precautions:  fall  Ice/elevate right leg to help decrease pain and swelling  Follow-up in 6 weeks     The patient understands, chooses and consents to this plan and accepts all the risks which include but are not limited to the risks mentioned above.             Brenda Henson DO, CAQSM, [facility]  Orthopaedic Surgeon

## 2024-05-16 ENCOUNTER — CLINICAL SUPPORT (OUTPATIENT)
Dept: REHABILITATION | Facility: HOSPITAL | Age: 49
End: 2024-05-16
Payer: COMMERCIAL

## 2024-05-16 DIAGNOSIS — R26.9 GAIT DIFFICULTY: ICD-10-CM

## 2024-05-16 DIAGNOSIS — R52 PAIN: Primary | ICD-10-CM

## 2024-05-16 DIAGNOSIS — R53.1 WEAKNESS: ICD-10-CM

## 2024-05-16 PROCEDURE — 97112 NEUROMUSCULAR REEDUCATION: CPT | Mod: PN,CQ

## 2024-05-16 PROCEDURE — 97110 THERAPEUTIC EXERCISES: CPT | Mod: PN,CQ

## 2024-05-16 PROCEDURE — 97116 GAIT TRAINING THERAPY: CPT | Mod: PN,CQ

## 2024-05-16 NOTE — PROGRESS NOTES
[facility]   Physical Therapy Treatment Note      Name: Harrison Estevez  [facility] Number: [id number]    Therapy Diagnosis:   Encounter Diagnoses   Name Primary?    Pain Yes    Weakness     Gait difficulty        Physician: Mg Fiore PA-C    Visit Date: 5/16/2024    Physician Orders: PT Eval and Treat   Medical Diagnosis from Referral:  Closed displaced segmental fracture of shaft of right tibia with routine healing, subsequent encounter   Closed fracture of shaft of right fibula with routine healing, unspecified fracture morphology, subsequent encounter  Evaluation Date: 3/13/2024  Authorization Period Expiration: 12/31/2024  Plan of Care Expiration: 5/24/24  Progress Note Due: 30 days  Visit # / Visits authorized: 14/ 20 + eval  FOTO: 2/3  Precautions: Standard, status post ORIF of right tibia with intramedullary nail with DOS on 4/1/24. You may wean out of the boot as tolerated.       *Do wear it if it is slippery/wet.  You may wean off of the walker as tolerated- but progress to a cane first.  You may need the boot/walker for longer expeditions.     PTA Visit #: 3/5    Time In: 3:30pm  Time Out: 4:28pm  Total Time: 56 minutes    Subjective     Patient reports: seeing Dr. Henson on Monday for update on current foot pain. Patient reports MD stated current pain is expected/normal. Patient also states getting an updated Xray and a small fracture is still present but not concerned as of now. Patient states interest in possibly being without the cane around her birthday next month and ideally would like to be able to wear small wedges.  She was compliant with home exercise program.  Response to previous treatment: soreness  Functional change: continuing to be more active than before    Pain: 4/10  Location: right foot      Objective    4/26/24 R knee AROM 0-115  4/10/24 R knee AROM 0- 105  4/10/24 FOTO completed       Treatment     Harrison received the treatments listed below:   "    therapeutic exercises to develop strength, endurance, ROM, and flexibility for 23 minutes including:    Bike for ROM, strength, and endurance x 6 minutes  Incline gastroc stretch 3 x 30 secs/ soleus stretch   Forward lunge to stairs for ankle DF 10 x5"  LAQ; 4# 2 x10  Sidelying hip abduction; 2 x10  Standing heel raise isometrics; 5 x20"    manual therapy techniques: Myofacial release and Soft tissue Mobilization were applied to the: right lower extremity for 00 minutes, including:  Knee PROM  Ankle PROM  Soft Tissue Mobilization around patella  Patella Mobs  Effleurage    neuromuscular re-education activities to improve: Balance, Coordination, Kinesthetic, and Proprioception for 17 minutes. The following activities were included:    Wobble board in sitting 2 min fwd/back and side/side  Shuttle DL 5 bands 3 minutes/  Shuttle heel raises 3B 2 x12  BAPs fwd/back, side/side, cw/ccw; 2 x10 each  SLR; 2 x12    therapeutic activities to improve functional performance for 04 minutes, including:    Sit to stands from 20" box 2 x 12      Gait training for 12 minutes including:    Rocking horse for proper mechanics and equalizing step lengths  Ambulating without upper extremity assistance in // bars gradually increasing marlena as tolerate  Ambulating towards mirror to help with posture, mechanics, and natural     Patient Education and Home Exercises       Education provided:   Written Home Exercises Provided: Patient instructed to cont prior HEP. Exercises were reviewed and Harrison was able to demonstrate them prior to the end of the session.  Harrison demonstrated good  understanding of the education provided. See Electronic Medical Record under Patient Instructions for exercises provided during therapy sessions    Assessment     Patient continues to demonstrate antalgic gait especially without standard cane. Educated and discussed with patient about the importance of proper posture and mechanics attempting to allow " herself to relax and move naturally compared to her current presentation of hesitation. With several minutes of practice and a few verbal cues provided, patient was able to improve mechanics, marlena, as well as step length of Left Lower Extremity stepping through Right Lower Extremity greater. Patient demonstrates no exacerbations of foot pain with therapy today with only fatigue in hip/lower extremity musculature present post treatment.    Harrison Is progressing well towards her goals.   Patient prognosis is Good.     Patient will continue to benefit from skilled outpatient physical therapy to address the deficits listed in the problem list box on initial evaluation, provide pt/family education and to maximize pt's level of independence in the home and community environment.     Patient's spiritual, cultural and educational needs considered and pt agreeable to plan of care and goals.     Anticipated barriers to physical therapy: attendance     Goals:   STG's 2 weeks  Patient will be independent with 50% of HEP MET 4/10/24     LTG's 10 weeks  Patient will improve FOTO functional measure  score  to 65 %  in order to improve overall QOL & return to PLOF Progressing 44 4/10/24  2. Patient will report an overall decrease in pain with ADL's and functional mobility Progressing  3. Patient will increase strength by at least 1/2 muscle grade in affected musculature to improve functional mobility  4. Patient will improve R knee and ankle ROM to improve functional mobility and ACTIVITIES OF DAILY LIVING's Progressing slowly  5. Patient will be more aware of posture throughout the day to reduce stress  and maintain optimal alignment of the spine Progressing  6. Patient will be independent with HEP Progressing  Plan   Plan of care Certification: 3/13/2024 to 5/24/24.     Outpatient Physical Therapy 2-3  times weekly for 10 weeks to include the following interventions: Electrical Stimulation PRN, Gait Training, Manual Therapy,  Moist Heat/ Ice, Neuromuscular Re-ed, Patient Education, Self Care, Therapeutic Activities, Therapeutic Exercise, and Ultrasound, ASTYM, Kinesiotaping PRN, Functional Dry Needling    Rohan Dominguez, PTA

## 2024-05-20 ENCOUNTER — CLINICAL SUPPORT (OUTPATIENT)
Dept: REHABILITATION | Facility: HOSPITAL | Age: 49
End: 2024-05-20
Payer: COMMERCIAL

## 2024-05-20 DIAGNOSIS — R60.9 LIPEDEMA: Primary | ICD-10-CM

## 2024-05-20 DIAGNOSIS — I89.0 LYMPHEDEMA OF RIGHT LOWER EXTREMITY: ICD-10-CM

## 2024-05-20 PROCEDURE — 97535 SELF CARE MNGMENT TRAINING: CPT | Mod: PN

## 2024-05-20 NOTE — PROGRESS NOTES
[facility]   Physical Therapy Treatment Note/Lymphedema       Name: Harrison Estevez  Clinic Number: [id number]    Therapy Diagnosis:   Encounter Diagnoses   Name Primary?    Lipedema Yes    Lymphedema of right lower extremity       Physician: Brenda Henson DO    Visit Date: 5/20/2024    Physician Orders: PT Eval and Treat  Medical Diagnosis from Referral: Lymphedema  Evaluation Date: 5/13/2024  Authorization Period Expiration: 12/31/2024  Plan of Care Expiration: 7/22/2023  Visit # / Visits authorized: 1  FOTO: 1/3    Time In: 3:25 pm  Time Out: 4:20 pm  Total Billable Time: 55 minutes    Precautions: Standard    Subjective     Pt reports: has not purchased velcro wraps yet but would like to have her leg wrapped. She has her daughter with her today who can learn to bandage her right leg.  She was compliant with home exercise program.  Response to previous treatment: good  Functional change: none at this time    Pain: 4/10  Location: right leg      Objective     Objective Measures updated at progress report unless specified    FUNCTION: no FOTO    Treatment:     Harrison received the following manual therapy techniques/self care and home management x 55 minutes:    -instructed in skin care + Manual Lymph Drainage for right leg (recommended both legs due to hx of Lipedema  -instructed how to apply short stretch bandages to foot, ankle, lower leg, knee, distal thigh (video of session was provided). Her daughter will help her bandage her leg    MULTILAYERED BANDAGING:  Issued supplies and bandaged  RLE with cotton stockinet, Arteflex padding, 6cm, 8cm, 10 cm, 12 cm short stretch bandages - cotton padding at anterior crease of front ankle, bandaged from foot to mid thigh,  To leave intact 24-48 hours if tolerated, discontinue bandages with any problems    Harrison received supervised modalities without adverse effects x - minutes:  SEQUENTIAL COMPRESSION PUMP: bilateral LE- Lympha press  with full sleeve  with distal pressures at 40mmHg     Home Exercises Provided and Patient Education Provided:   -see self care section above  PATIENT/FAMILY Education: bandaging wear schedule,  HEP,  Beginning of self massage,  Self or assisted bandaging, and Risk reduction to decrease risk for cellulitis,  To remove bandages from lower extremity if there are signs of arterial compromise (pain that does not improve, pain increases with elevation, toes turn blue)      Assessment     Harrison was instructed in Manual Lymph Drainage today, with focus on right leg. It was recommended that she provide Manual Lymph Drainage to both legs due to hx of Lipedema. Her daughter was present today and will assist her mom in applying compression bandages  Harrison Is progressing well towards her goals. She has tried elevation, exercise, compression garments/bandaging for since 5/13/2024 and swelling still persists. Home pump is recommended for both legs and hips due to lipedema and lymphedema of right leg  Pt prognosis is Excellent.     Pt will continue to benefit from skilled outpatient physical therapy to address the deficits listed in the problem list box on initial evaluation, provide pt/family education and to maximize pt's level of independence in the home and community environment.     Pt's spiritual, cultural and educational needs considered and pt agreeable to plan of care and goals.     Anticipated barriers to physical therapy: none    Goals:   Short Term Goals: (5 weeks)  1. Patient will show decreased girth in R LE by up to 1-2 cm to allow for LE symmetry, shoe and clothing choice, and ability to apply needed compression.  (progressing, not met)   2. Patient will demonstrate 100% knowledge of lymphedema precautions and signs of infection to allow for reduced lymphedema risk, infection risk, and/or exacerbation of condition.  (progressing, not met)  3. Patient or caregiver will perform self-bandaging techniques and/or  wearing of compression garments to allow for lymphatic drainage support, skin elasticity, and reduction in shape and size of limb. (progressing, not met)  4. Patient will perform self lymph drainage techniques to areas within reach to enhance lymphatic drainage and skin condition.  (progressing, not met)  5. Patient will tolerate daily activities with multilayered bandaging to allow for lymphatic and venous support.  (progressing, not met)     Long Term Goals: (10  weeks)  1. Patient will show decreased girth in R LE by up to 2-4 cm  to allow for LE symmetry, shoe and clothing choice, and ability to apply needed compression daily.  (progressing, not met)  2. Patient will show reduction in density to mild or less with improved contour of limb to allow for cosmesis, LE symmetry, infection risk reduction, and clothing and compression choice.   (progressing, not met)  3. Patient to arun/doff compression garment with daily compliance to assist in lymphedema management, skin elasticity, and tissue density.  (progressing, not met)  4. Pt to show improved postural awareness and alignment.  (progressing, not met)  5. Pt to be I and compliant with HEP to allow for increased function in affected limb.   (progressing, not met)        Plan   Plan of care Certification: 5/13/2024 to 7/22/2024.     Outpatient Physical Therapy 1 times weekly for 10 weeks to include the following interventions: Manual Therapy, Patient Education, Self Care, and vaso-pneumatic compression. Complete Decongestive Therapy- compression and home equipment needs to be addressed and assisted.       Carol Samayoa, PT

## 2024-05-29 ENCOUNTER — CLINICAL SUPPORT (OUTPATIENT)
Dept: REHABILITATION | Facility: HOSPITAL | Age: 49
End: 2024-05-29
Payer: COMMERCIAL

## 2024-05-29 DIAGNOSIS — R53.1 WEAKNESS: ICD-10-CM

## 2024-05-29 DIAGNOSIS — R52 PAIN: Primary | ICD-10-CM

## 2024-05-29 DIAGNOSIS — R26.9 GAIT DIFFICULTY: ICD-10-CM

## 2024-05-29 PROCEDURE — 97112 NEUROMUSCULAR REEDUCATION: CPT | Mod: PN

## 2024-05-29 PROCEDURE — 97530 THERAPEUTIC ACTIVITIES: CPT | Mod: PN

## 2024-05-29 PROCEDURE — 97110 THERAPEUTIC EXERCISES: CPT | Mod: PN

## 2024-05-29 NOTE — PROGRESS NOTES
[facility]   Physical Therapy Treatment Note / updated POC     Name: Harrison Estevez  Clinic Number: [id number]    Therapy Diagnosis:   Encounter Diagnoses   Name Primary?    Pain Yes    Weakness     Gait difficulty        Physician: Mg Fiore PA-C    Visit Date: 5/29/2024    Physician Orders: PT Eval and Treat   Medical Diagnosis from Referral:  Closed displaced segmental fracture of shaft of right tibia with routine healing, subsequent encounter   Closed fracture of shaft of right fibula with routine healing, unspecified fracture morphology, subsequent encounter  Evaluation Date: 3/13/2024  Authorization Period Expiration: 12/31/2024  Plan of Care Expiration: 7/12/24  Progress Note Due: 30 days  Visit # / Visits authorized: 15/ 20 + eval  FOTO: 2/3  Precautions: Standard, status post ORIF of right tibia with intramedullary nail with DOS on 1/4/24. You may wean out of the boot as tolerated.       *Do wear it if it is slippery/wet.  You may wean off of the walker as tolerated- but progress to a cane first.  You may need the boot/walker for longer expeditions.     PTA Visit #: 0/5    Time In: 4:19 patient late  Time Out: 5:07  Total Time: 48 minutes    Subjective     Patient reports: she returned full time on 5/20/24 and she is tired. Patient reports no pain in knee and leg only pain in R foot and feels like her foot wants to turn out. F/u with Dr. Henson on 6/16/24 and will perform X-ray of R foot/ ankle at that time. Patient would like to walk without cane by 6/23/24 for her birthday and wear small wedges.     She was compliant with home exercise program.  Response to previous treatment: soreness  Functional change: continuing to be more active than before    Pain: 3/10  Location: right foot      Objective    5/29/24 FOTO completed    5/29/24 AROM R knee 0-110, R DF 10 degrees from neutral, PF 40 degrees, inversion 10, eversion 20, MMT DF R 3+/5, inversion 3/5, eversion 3+/5, big  toe 5/5, knee extension 2+/5 painful, knee flexion 3+/5,     4/26/24 R knee AROM 0-115  4/10/24 R knee AROM 0- 105  4/10/24 FOTO completed       Treatment     Prenda received the treatments listed below:      therapeutic exercises to develop strength, endurance, ROM, and flexibility for 8  minutes including:  MMT  AROM      manual therapy techniques: Myofacial release and Soft tissue Mobilization were applied to the: right lower extremity for 00 minutes, including:  Knee PROM  Ankle PROM  Soft Tissue Mobilization around patella  Patella Mobs  Effleurage    neuromuscular re-education activities to improve: Balance, Coordination, Kinesthetic, and Proprioception for 23 minutes. The following activities were included:  Ankle 4 way with Red TB 1 x 15 reps each  Wobble board in sitting 3' f/b/ 3' s/s    therapeutic activities to improve functional performance for 17 minutes, including:    HEP reviewed  Progress and POC reviewed  FOTO completed / functional activities discussed        Gait training for 0 minutes including:    Rocking horse for proper mechanics and equalizing step lengths  Ambulating without upper extremity assistance in // bars gradually increasing marlena as tolerate  Ambulating towards mirror to help with posture, mechanics, and natural     Patient Education and Home Exercises       Education provided:   Written Home Exercises Provided: Patient instructed to cont prior HEP. Exercises were reviewed and Harrison was able to demonstrate them prior to the end of the session.  Prenda demonstrated good  understanding of the education provided. See Electronic Medical Record under Patient Instructions for exercises provided during therapy sessions    Assessment   Patient is almost 5 months post surgery. She was last seen on 5/16/24 for therapy to [facility]IE METZGER. Patient overwhelmed since starting back full time at work. Patient is progressing in therapy with strength and ROM. AROM R knee 0-110, R DF 10 degrees from neutral,  PF 40 degrees, inversion 10, eversion 20, MMT DF R 3+/5, inversion 3/5, eversion 3+/5, big toe 5/5, knee extension 2+/5 painful, knee flexion 3+/5. Pain continues to be a limiting factor.  HEP reviewed to increase ROM in ankle and knee. FOTO score is improving.  Patient would benefit from continued skilled PT intervention for pain management, ROM/ flexibility, strength, and progressive gait/ balance training to return to PRIOR LEVEL OF FUNCTION. Swelling management being addressed by lymphedema specialist and pump is being ordered.     Harrison Is progressing well towards her goals.   Patient prognosis is Good.     Patient will continue to benefit from skilled outpatient physical therapy to address the deficits listed in the problem list box on initial evaluation, provide pt/family education and to maximize pt's level of independence in the home and community environment.     Patient's spiritual, cultural and educational needs considered and pt agreeable to plan of care and goals.     Anticipated barriers to physical therapy: attendance     Goals:   STG's 2 weeks  Patient will be independent with 50% of HEP MET 4/10/24     LTG's 10 weeks  Patient will improve FOTO functional measure  score  to 65 %  in order to improve overall QOL & return to PLOF Progressing 44 4/10/24/ 62 5/29/24  2. Patient will report an overall decrease in pain with ADL's and functional mobility Progressing  3. Patient will increase strength by at least 1/2 muscle grade in affected musculature to improve functional mobility MET 5/29/24  4. Patient will improve R knee and ankle ROM to improve functional mobility and ACTIVITIES OF DAILY LIVING's Progressing   5. Patient will be more aware of posture throughout the day to reduce stress  and maintain optimal alignment of the spine Progressing  6. Patient will be independent with HEP Progressing  7. Patient will increase strength by at least 1 muscle grade in affected musculature to improve functional  mobility  Plan   Plan of care Certification: 3/13/2024 to 5/24/24.     Outpatient Physical Therapy 2-3  times weekly for 10 weeks to include the following interventions: Electrical Stimulation PRN, Gait Training, Manual Therapy, Moist Heat/ Ice, Neuromuscular Re-ed, Patient Education, Self Care, Therapeutic Activities, Therapeutic Exercise, and Ultrasound, ASTYM, Kinesiotaping PRN, Functional Dry Needling    Rosa Hernandez, PT

## 2024-06-03 ENCOUNTER — CLINICAL SUPPORT (OUTPATIENT)
Dept: REHABILITATION | Facility: HOSPITAL | Age: 49
End: 2024-06-03
Payer: COMMERCIAL

## 2024-06-03 DIAGNOSIS — R53.1 WEAKNESS: ICD-10-CM

## 2024-06-03 DIAGNOSIS — R26.9 GAIT DIFFICULTY: ICD-10-CM

## 2024-06-03 DIAGNOSIS — R52 PAIN: Primary | ICD-10-CM

## 2024-06-03 PROCEDURE — 97530 THERAPEUTIC ACTIVITIES: CPT | Mod: PN,CQ

## 2024-06-03 PROCEDURE — 97110 THERAPEUTIC EXERCISES: CPT | Mod: PN,CQ

## 2024-06-03 PROCEDURE — 97112 NEUROMUSCULAR REEDUCATION: CPT | Mod: PN,CQ

## 2024-06-03 NOTE — PROGRESS NOTES
[facility]   Physical Therapy Treatment Note      Name: Harrison Estevez  [facility] Number: [id number]    Therapy Diagnosis:   Encounter Diagnoses   Name Primary?    Pain Yes    Weakness     Gait difficulty        Physician: Brenda Henson DO    Visit Date: 6/3/2024    Physician Orders: PT Eval and Treat   Medical Diagnosis from Referral:  Closed displaced segmental fracture of shaft of right tibia with routine healing, subsequent encounter   Closed fracture of shaft of right fibula with routine healing, unspecified fracture morphology, subsequent encounter  Evaluation Date: 3/13/2024  Authorization Period Expiration: 12/31/2024  Plan of Care Expiration: 7/12/24   Progress Note Due: 30 days  Visit # / Visits authorized: 2/ 20 + eval (episode 17)  FOTO: 2/3  Precautions: Standard, status post ORIF of right tibia with intramedullary nail with DOS on 4/1/24. You may wean out of the boot as tolerated.       *Do wear it if it is slippery/wet.  You may wean off of the walker as tolerated- but progress to a cane first.  You may need the boot/walker for longer expeditions.     PTA Visit #: 1/5    Time In: 4:50pm  Time Out: 5:35pm  Total Time: 30 minutes    Subjective     Patient reports: continuing to have anterior ankle pain during gait. Patient reports minimal consistency with home exercises with work and still trying to take care of her mother.   She was not compliant with home exercise program.  Response to previous treatment: soreness  Functional change: continuing to be more active than before    Pain: 4/10  Location: right ankle      Objective    4/26/24 R knee AROM 0-115  4/10/24 R knee AROM 0- 105  4/10/24 FOTO completed       Treatment     Harrison received the treatments listed below:      therapeutic exercises to develop strength, endurance, ROM, and flexibility for 11 minutes including:    Bike for ROM, strength, and endurance x 6 minutes  Incline gastroc stretch 3 x 30 secs/ soleus  "stretch   Forward lunge to stairs for ankle DF 10 x5"  LAQ; 4# 2 x10  Sidelying hip abduction; 2 x10  Standing heel raise isometrics; 5 x20"  Bridges; x25    manual therapy techniques: Myofacial release and Soft tissue Mobilization were applied to the: right lower extremity for 00 minutes, including:  Knee PROM  Ankle PROM  Soft Tissue Mobilization around patella  Patella Mobs  Effleurage    neuromuscular re-education activities to improve: Balance, Coordination, Kinesthetic, and Proprioception for 10 minutes. The following activities were included:    Wobble board in sitting 2 min fwd/back and side/side  Shuttle DL 5 bands 3 minutes/  Shuttle heel raises 3B 2 x12  BAPs fwd/back, side/side, cw/ccw; 2 x10 each  SLR; 2 x12    therapeutic activities to improve functional performance for 09 minutes, including:    Sit to stands from 20" box 2 x 12    Additional HEP:  Seated heel slides  Seated gastroc stretch  Ankle DF AROM with Theraband    Gait training for 00 minutes including:    Rocking horse for proper mechanics and equalizing step lengths  Ambulating without upper extremity assistance in // bars gradually increasing marlena as tolerate  Ambulating towards mirror to help with posture, mechanics, and natural     Patient Education and Home Exercises       Education provided:   Written Home Exercises Provided: Patient instructed to cont prior HEP. Exercises were reviewed and Harrison was able to demonstrate them prior to the end of the session.  Harrison demonstrated good  understanding of the education provided. See Electronic Medical Record under Patient Instructions for exercises provided during therapy sessions    Assessment     Continued antalgic gait present when weightbearing through Right Lower Extremity. Patient demonstrates limitations of pain in anteromedial ankle with ankle dorsiflexion primarily in weightbearing positions both seated and standing. Included additional dorsiflexion activities today in attempt " to improve current Range of Motion and strength deficits. Discussed with patient about including these activities daily for greater likelihood of improvements.     Harrison Is progressing well towards her goals.   Patient prognosis is Good.     Patient will continue to benefit from skilled outpatient physical therapy to address the deficits listed in the problem list box on initial evaluation, provide pt/family education and to maximize pt's level of independence in the home and community environment.     Patient's spiritual, cultural and educational needs considered and pt agreeable to plan of care and goals.     Anticipated barriers to physical therapy: attendance     Goals:   STG's 2 weeks  Patient will be independent with 50% of HEP MET 4/10/24     LTG's 10 weeks  Patient will improve FOTO functional measure  score  to 65 %  in order to improve overall QOL & return to PLOF Progressing 44 4/10/24/ 62 5/29/24  2. Patient will report an overall decrease in pain with ADL's and functional mobility Progressing  3. Patient will increase strength by at least 1/2 muscle grade in affected musculature to improve functional mobility MET 5/29/24  4. Patient will improve R knee and ankle ROM to improve functional mobility and ACTIVITIES OF DAILY LIVING's Progressing   5. Patient will be more aware of posture throughout the day to reduce stress  and maintain optimal alignment of the spine Progressing  6. Patient will be independent with HEP Progressing  7. Patient will increase strength by at least 1 muscle grade in affected musculature to improve functional mobility  Plan   Plan of care Certification: 3/13/2024 to 5/24/24.     Outpatient Physical Therapy 2-3  times weekly for 10 weeks to include the following interventions: Electrical Stimulation PRN, Gait Training, Manual Therapy, Moist Heat/ Ice, Neuromuscular Re-ed, Patient Education, Self Care, Therapeutic Activities, Therapeutic Exercise, and Ultrasound, JANET,  Kinesiotaping PRN, Functional Dry Needling    Rohan Dominguez, PTA

## 2024-06-05 ENCOUNTER — CLINICAL SUPPORT (OUTPATIENT)
Dept: REHABILITATION | Facility: HOSPITAL | Age: 49
End: 2024-06-05
Payer: COMMERCIAL

## 2024-06-05 DIAGNOSIS — R52 PAIN: Primary | ICD-10-CM

## 2024-06-05 DIAGNOSIS — R26.9 GAIT DIFFICULTY: ICD-10-CM

## 2024-06-05 DIAGNOSIS — R53.1 WEAKNESS: ICD-10-CM

## 2024-06-05 PROCEDURE — 97110 THERAPEUTIC EXERCISES: CPT | Mod: PN,CQ

## 2024-06-05 PROCEDURE — 97112 NEUROMUSCULAR REEDUCATION: CPT | Mod: PN,CQ

## 2024-06-05 NOTE — PROGRESS NOTES
"[facility]   Physical Therapy Treatment Note      Name: Harrison Estevez  [facility] Number: [id number]    Therapy Diagnosis:   Encounter Diagnoses   Name Primary?    Pain Yes    Weakness     Gait difficulty        Physician: Brenda Henson DO    Visit Date: 6/5/2024    Physician Orders: PT Eval and Treat   Medical Diagnosis from Referral:  Closed displaced segmental fracture of shaft of right tibia with routine healing, subsequent encounter   Closed fracture of shaft of right fibula with routine healing, unspecified fracture morphology, subsequent encounter  Evaluation Date: 3/13/2024  Authorization Period Expiration: 12/31/2024  Plan of Care Expiration: 7/12/24   Progress Note Due: 30 days  Visit # / Visits authorized: 2/ 20 + eval (episode 17)  FOTO: 2/3  Precautions: Standard, status post ORIF of right tibia with intramedullary nail with DOS on 4/1/24. You may wean out of the boot as tolerated.       *Do wear it if it is slippery/wet.  You may wean off of the walker as tolerated- but progress to a cane first.  You may need the boot/walker for longer expeditions.     PTA Visit #: 1/5    Time In: 4:50pm  Time Out: 5:35pm  Total Time: 30 minutes    Subjective     Patient reports: getting in touch with [employer]' to provide them with the necessary information to finally get her compression pumps ordered.  She was compliant with home exercise program.  Response to previous treatment: soreness  Functional change: continuing to be more active than before    Pain: 4/10  Location: right foot      Objective    4/26/24 R knee AROM 0-115  4/10/24 R knee AROM 0- 105  4/10/24 FOTO completed       Treatment     Harrison received the treatments listed below:      therapeutic exercises to develop strength, endurance, ROM, and flexibility for 14 minutes including:    Bike for ROM, strength, and endurance x 6 minutes  Incline gastroc stretch 5 x15"  secs  Forward lunge to stairs for ankle DF 10 x5"  LAQ; 4# " "2 x10  Sidelying hip abduction; 2 x10  Sidelying clams; 2 x12  Standing heel raises 4 x 5  Bridges; x25    manual therapy techniques: Myofacial release and Soft tissue Mobilization were applied to the: right lower extremity for 00 minutes, including:  Knee PROM  Ankle PROM  Soft Tissue Mobilization around patella  Patella Mobs  Effleurage    neuromuscular re-education activities to improve: Balance, Coordination, Kinesthetic, and Proprioception for 11 minutes. The following activities were included:    BAPs in sitting 2 min fwd/back and side/side  Shuttle DL 5 bands 3 minutes  BAPs fwd/back, side/side, cw/ccw; 2 x10 each  SLR; 2 x12 B      therapeutic activities to improve functional performance for 04 minutes, including:    Sit to stands from 20" box 2 x 12      Gait training for 00 minutes including:    Rocking horse for proper mechanics and equalizing step lengths  Ambulating without upper extremity assistance in // bars gradually increasing marlena as tolerate  Ambulating towards mirror to help with posture, mechanics, and natural     Patient Education and Home Exercises       Education provided:   Written Home Exercises Provided: Patient instructed to cont prior HEP. Exercises were reviewed and Harrison was able to demonstrate them prior to the end of the session.  Harrison demonstrated good  understanding of the education provided. See Electronic Medical Record under Patient Instructions for exercises provided during therapy sessions    Assessment     Patient demonstrates improvements in gait mechanics when decreased her marlena. Continued limitations of pain present in anteromedial ankle during weightbearing as well as with ankle dorsiflexion. Patient demonstrates improvements with hip and lower extremity strengthening activities though continues to demonstrate fatigue towards end of treatment session. Plan to continue progression of strengthening and weightbearing tolerance as tolerated..     Harrison Is " progressing well towards her goals.   Patient prognosis is Good.     Patient will continue to benefit from skilled outpatient physical therapy to address the deficits listed in the problem list box on initial evaluation, provide pt/family education and to maximize pt's level of independence in the home and community environment.     Patient's spiritual, cultural and educational needs considered and pt agreeable to plan of care and goals.     Anticipated barriers to physical therapy: attendance     Goals:   STG's 2 weeks  Patient will be independent with 50% of HEP MET 4/10/24     LTG's 10 weeks  Patient will improve FOTO functional measure  score  to 65 %  in order to improve overall QOL & return to PLOF Progressing 44 4/10/24/ 62 5/29/24  2. Patient will report an overall decrease in pain with ADL's and functional mobility Progressing  3. Patient will increase strength by at least 1/2 muscle grade in affected musculature to improve functional mobility MET 5/29/24  4. Patient will improve R knee and ankle ROM to improve functional mobility and ACTIVITIES OF DAILY LIVING's Progressing   5. Patient will be more aware of posture throughout the day to reduce stress  and maintain optimal alignment of the spine Progressing  6. Patient will be independent with HEP Progressing  7. Patient will increase strength by at least 1 muscle grade in affected musculature to improve functional mobility  Plan   Plan of care Certification: 3/13/2024 to 5/24/24 extended to 7/12/24      Outpatient Physical Therapy 2-3  times weekly for 10 weeks to include the following interventions: Electrical Stimulation PRN, Gait Training, Manual Therapy, Moist Heat/ Ice, Neuromuscular Re-ed, Patient Education, Self Care, Therapeutic Activities, Therapeutic Exercise, and Ultrasound, ASTYM, Kinesiotaping PRN, Functional Dry Needling    Rohan Dominguez, PTA

## 2024-06-07 ENCOUNTER — CLINICAL SUPPORT (OUTPATIENT)
Dept: REHABILITATION | Facility: HOSPITAL | Age: 49
End: 2024-06-07
Payer: COMMERCIAL

## 2024-06-07 DIAGNOSIS — R53.1 WEAKNESS: ICD-10-CM

## 2024-06-07 DIAGNOSIS — R26.9 GAIT DIFFICULTY: ICD-10-CM

## 2024-06-07 DIAGNOSIS — R52 PAIN: Primary | ICD-10-CM

## 2024-06-07 PROCEDURE — 97112 NEUROMUSCULAR REEDUCATION: CPT | Mod: PN,CQ

## 2024-06-07 PROCEDURE — 97110 THERAPEUTIC EXERCISES: CPT | Mod: PN,CQ

## 2024-06-07 PROCEDURE — 97530 THERAPEUTIC ACTIVITIES: CPT | Mod: PN,CQ

## 2024-06-07 NOTE — PROGRESS NOTES
[facility]   Physical Therapy Treatment Note      Name: Harrison FU [facility] Number: [id number]    Therapy Diagnosis:   Encounter Diagnoses   Name Primary?    Pain Yes    Weakness     Gait difficulty        Physician: Brenda Henson DO    Visit Date: 6/7/2024    Physician Orders: PT Eval and Treat   Medical Diagnosis from Referral:  Closed displaced segmental fracture of shaft of right tibia with routine healing, subsequent encounter   Closed fracture of shaft of right fibula with routine healing, unspecified fracture morphology, subsequent encounter  Evaluation Date: 3/13/2024  Authorization Period Expiration: 12/31/2024  Plan of Care Expiration: 7/12/24   Progress Note Due: 30 days  Visit # / Visits authorized: 2/ 20 + eval (episode 17)  FOTO: 2/3  Precautions: Standard, status post ORIF of right tibia with intramedullary nail with DOS on 4/1/24. You may wean out of the boot as tolerated.       *Do wear it if it is slippery/wet.  You may wean off of the walker as tolerated- but progress to a cane first.  You may need the boot/walker for longer expeditions.     PTA Visit #: 1/5    Time In: 3:03pm  Time Out: 4:00pm  Total Time: 30 minutes    Subjective     Patient reports: has not heard word of her lymphedema pumps being shipped yet. Patient also reports no changes currently present in anteromedial ankle pain present during weightbearing. Patient reports continuing to have tiredness from work and is inconsistent with activities for herself such as stretching/exercise.  She was partially compliant with home exercise program.  Response to previous treatment: soreness  Functional change: continuing to have increased activity with work and family    Pain: 4/10  Location: right foot      Objective    4/26/24 R knee AROM 0-115  4/10/24 R knee AROM 0- 105  4/10/24 FOTO completed       Treatment     Harrison received the treatments listed below:      therapeutic exercises to develop strength,  "endurance, ROM, and flexibility for 12 minutes including:    Bike for ROM, strength, and endurance x 6 minutes  Incline gastroc stretch 5 x15"  secs  Forward lunge to stairs for ankle DF 10 x5"  LAQ; 4# 2 x10  Sidelying hip abduction; 2 x10  Sidelying clams; 2 x15  Bridges; x30  Supine heel slides on incline 2 x10  Standing heel raises 4 x 5      manual therapy techniques: Myofacial release and Soft tissue Mobilization were applied to the: right lower extremity for 00 minutes, including:  Knee PROM  Ankle PROM  Soft Tissue Mobilization around patella  Patella Mobs  Effleurage    neuromuscular re-education activities to improve: Balance, Coordination, Kinesthetic, and Proprioception for 10 minutes. The following activities were included:    BAPs in sitting 2 min fwd/back, side/side, cw/ccw  Shuttle DL 5 bands 3 minutes  SLR; 2 x12 B  Standing hip 3 way 2x10 each    therapeutic activities to improve functional performance for 08 minutes, including:    Sit to stands from 20" box 3 x10  Lateral stepping 3 laps of 15' down and back        Gait training for 00 minutes including:    Rocking horse for proper mechanics and equalizing step lengths  Ambulating without upper extremity assistance in // bars gradually increasing marlena as tolerate  Ambulating towards mirror to help with posture, mechanics, and natural     Patient Education and Home Exercises       Education provided:   Written Home Exercises Provided: Patient instructed to cont prior HEP. Exercises were reviewed and Harrison was able to demonstrate them prior to the end of the session.  Harrison demonstrated good  understanding of the education provided. See Electronic Medical Record under Patient Instructions for exercises provided during therapy sessions    Assessment     Continued edema swelling throughout right>left lower extremities. Patient continues to demonstrate improved gait mechanics as well as presents to tolerate weightbearing on Right Lower Extremity " greater when marlena is controlled. Continued assistance from a standard cane is needed during gait to allow for weightbearing into Right Lower Extremity more easily. When attempting to weight bear normally without assistive device patient is unable to prevent antalgic compensations.     Harrison Is progressing well towards her goals.   Patient prognosis is Good.     Patient will continue to benefit from skilled outpatient physical therapy to address the deficits listed in the problem list box on initial evaluation, provide pt/family education and to maximize pt's level of independence in the home and community environment.     Patient's spiritual, cultural and educational needs considered and pt agreeable to plan of care and goals.     Anticipated barriers to physical therapy: attendance     Goals:   STG's 2 weeks  Patient will be independent with 50% of HEP MET 4/10/24     LTG's 10 weeks  Patient will improve FOTO functional measure  score  to 65 %  in order to improve overall QOL & return to PLOF Progressing 44 4/10/24/ 62 5/29/24  2. Patient will report an overall decrease in pain with ADL's and functional mobility Progressing  3. Patient will increase strength by at least 1/2 muscle grade in affected musculature to improve functional mobility MET 5/29/24  4. Patient will improve R knee and ankle ROM to improve functional mobility and ACTIVITIES OF DAILY LIVING's Progressing   5. Patient will be more aware of posture throughout the day to reduce stress  and maintain optimal alignment of the spine Progressing  6. Patient will be independent with HEP Progressing  7. Patient will increase strength by at least 1 muscle grade in affected musculature to improve functional mobility  Plan   Plan of care Certification: 3/13/2024 to 5/24/24 extended to 7/12/24      Outpatient Physical Therapy 2-3  times weekly for 10 weeks to include the following interventions: Electrical Stimulation PRN, Gait Training, Manual Therapy,  Moist Heat/ Ice, Neuromuscular Re-ed, Patient Education, Self Care, Therapeutic Activities, Therapeutic Exercise, and Ultrasound, ASTYM, Kinesiotaping PRN, Functional Dry Needling    Rohan Dominguez, PTA

## 2024-06-10 ENCOUNTER — TELEPHONE (OUTPATIENT)
Dept: ORTHOPEDICS | Facility: CLINIC | Age: 49
End: 2024-06-10
Payer: COMMERCIAL

## 2024-06-10 NOTE — PLAN OF CARE
[facility]   Physical Therapy Treatment Note / updated POC     Name: Harrison Estevez  Clinic Number: [id number]    Therapy Diagnosis:   Encounter Diagnoses   Name Primary?    Pain Yes    Weakness     Gait difficulty        Physician: Mg Fiore PA-C    Visit Date: 5/29/2024    Physician Orders: PT Eval and Treat   Medical Diagnosis from Referral:  Closed displaced segmental fracture of shaft of right tibia with routine healing, subsequent encounter   Closed fracture of shaft of right fibula with routine healing, unspecified fracture morphology, subsequent encounter  Evaluation Date: 3/13/2024  Authorization Period Expiration: 12/31/2024  Plan of Care Expiration: 7/12/24  Progress Note Due: 30 days  Visit # / Visits authorized: 15/ 20 + eval  FOTO: 2/3  Precautions: Standard, status post ORIF of right tibia with intramedullary nail with DOS on 1/4/24. You may wean out of the boot as tolerated.       *Do wear it if it is slippery/wet.  You may wean off of the walker as tolerated- but progress to a cane first.  You may need the boot/walker for longer expeditions.     PTA Visit #: 0/5    Time In: 4:19 patient late  Time Out: 5:07  Total Time: 48 minutes    Subjective     Patient reports: she returned full time on 5/20/24 and she is tired. Patient reports no pain in knee and leg only pain in R foot and feels like her foot wants to turn out. F/u with Dr. Henson on 6/16/24 and will perform X-ray of R foot/ ankle at that time. Patient would like to walk without cane by 6/23/24 for her birthday and wear small wedges.     She was compliant with home exercise program.  Response to previous treatment: soreness  Functional change: continuing to be more active than before    Pain: 3/10  Location: right foot      Objective    5/29/24 FOTO completed    5/29/24 AROM R knee 0-110, R DF 10 degrees from neutral, PF 40 degrees, inversion 10, eversion 20, MMT DF R 3+/5, inversion 3/5, eversion 3+/5, big  Render In Strict Bullet Format?: No toe 5/5, knee extension 2+/5 painful, knee flexion 3+/5,     4/26/24 R knee AROM 0-115  4/10/24 R knee AROM 0- 105  4/10/24 FOTO completed       Treatment     Prenda received the treatments listed below:      therapeutic exercises to develop strength, endurance, ROM, and flexibility for 8  minutes including:  MMT  AROM      manual therapy techniques: Myofacial release and Soft tissue Mobilization were applied to the: right lower extremity for 00 minutes, including:  Knee PROM  Ankle PROM  Soft Tissue Mobilization around patella  Patella Mobs  Effleurage    neuromuscular re-education activities to improve: Balance, Coordination, Kinesthetic, and Proprioception for 23 minutes. The following activities were included:  Ankle 4 way with Red TB 1 x 15 reps each  Wobble board in sitting 3' f/b/ 3' s/s    therapeutic activities to improve functional performance for 17 minutes, including:    HEP reviewed  Progress and POC reviewed  FOTO completed / functional activities discussed        Gait training for 0 minutes including:    Rocking horse for proper mechanics and equalizing step lengths  Ambulating without upper extremity assistance in // bars gradually increasing marlena as tolerate  Ambulating towards mirror to help with posture, mechanics, and natural     Patient Education and Home Exercises       Education provided:   Written Home Exercises Provided: Patient instructed to cont prior HEP. Exercises were reviewed and Harrison was able to demonstrate them prior to the end of the session.  Prenda demonstrated good  understanding of the education provided. See Electronic Medical Record under Patient Instructions for exercises provided during therapy sessions    Assessment   Patient is almost 5 months post surgery. She was last seen on 5/16/24 for therapy to NICKIE METZGER. Patient overwhelmed since starting back full time at work. Patient is progressing in therapy with strength and ROM. AROM R knee 0-110, R DF 10 degrees from neutral,  PF 40 degrees, inversion 10, eversion 20, MMT DF R 3+/5, inversion 3/5, eversion 3+/5, big toe 5/5, knee extension 2+/5 painful, knee flexion 3+/5. Pain continues to be a limiting factor.  HEP reviewed to increase ROM in ankle and knee. FOTO score is improving.  Patient would benefit from continued skilled PT intervention for pain management, ROM/ flexibility, strength, and progressive gait/ balance training to return to PRIOR LEVEL OF FUNCTION. Swelling management being addressed by lymphedema specialist and pump is being ordered.     Harrison Is progressing well towards her goals.   Patient prognosis is Good.     Patient will continue to benefit from skilled outpatient physical therapy to address the deficits listed in the problem list box on initial evaluation, provide pt/family education and to maximize pt's level of independence in the home and community environment.     Patient's spiritual, cultural and educational needs considered and pt agreeable to plan of care and goals.     Anticipated barriers to physical therapy: attendance     Goals:   STG's 2 weeks  Patient will be independent with 50% of HEP MET 4/10/24     LTG's 10 weeks  Patient will improve FOTO functional measure  score  to 65 %  in order to improve overall QOL & return to PLOF Progressing 44 4/10/24/ 62 5/29/24  2. Patient will report an overall decrease in pain with ADL's and functional mobility Progressing  3. Patient will increase strength by at least 1/2 muscle grade in affected musculature to improve functional mobility MET 5/29/24  4. Patient will improve R knee and ankle ROM to improve functional mobility and ACTIVITIES OF DAILY LIVING's Progressing   5. Patient will be more aware of posture throughout the day to reduce stress  and maintain optimal alignment of the spine Progressing  6. Patient will be independent with HEP Progressing  7. Patient will increase strength by at least 1 muscle grade in affected musculature to improve functional  Plan: Pt states that he had a rash that appeared on his lower right leg, right side and on his back. Pt states this rash was very itchy, and he did see his pcp and she treated him got ring worm. Pt states things got worse with that, so he went back to the Dr and was given ketoconazole pills and triamcinolone cream and things got better, \\n\\nHMB discussed d/t things getting better with both fungal treatment and topical steroid cream it is hard to say what it was exactly, and especially d/t things being better. Educates pt if this is an eczema type rash, pt may develop this rash again and reassures pt he can use that triamcinolone \\n\\nHMB discussed if this is a fungal rash, summer time would be the time for that to re-appear. Discussed if that does come back pt can call Detail Level: Zone mobility  Plan   Plan of care Certification: 3/13/2024 to 5/24/24.     Outpatient Physical Therapy 2-3  times weekly for 10 weeks to include the following interventions: Electrical Stimulation PRN, Gait Training, Manual Therapy, Moist Heat/ Ice, Neuromuscular Re-ed, Patient Education, Self Care, Therapeutic Activities, Therapeutic Exercise, and Ultrasound, ASTYM, Kinesiotaping PRN, Functional Dry Needling    Rosa Hernandez, PT

## 2024-06-10 NOTE — TELEPHONE ENCOUNTER
Spoke with Serena/ [facility] and faxed patient's signed order back to her. Understanding verbalized.

## 2024-06-10 NOTE — TELEPHONE ENCOUNTER
----- Message from Salma Villalobos sent at 6/10/2024  4:13 PM CDT -----  Contact: анна Lyons is calling to see if a referral was received on the above patient. Please callback [phone] was faxed on 06/03 and 06/06

## 2024-06-12 ENCOUNTER — CLINICAL SUPPORT (OUTPATIENT)
Dept: REHABILITATION | Facility: HOSPITAL | Age: 49
End: 2024-06-12
Payer: COMMERCIAL

## 2024-06-12 DIAGNOSIS — R53.1 WEAKNESS: ICD-10-CM

## 2024-06-12 DIAGNOSIS — R26.9 GAIT DIFFICULTY: ICD-10-CM

## 2024-06-12 DIAGNOSIS — R52 PAIN: Primary | ICD-10-CM

## 2024-06-12 PROCEDURE — 97112 NEUROMUSCULAR REEDUCATION: CPT | Mod: PN,CQ

## 2024-06-12 PROCEDURE — 97110 THERAPEUTIC EXERCISES: CPT | Mod: PN,CQ

## 2024-06-12 PROCEDURE — 97530 THERAPEUTIC ACTIVITIES: CPT | Mod: PN,CQ

## 2024-06-12 NOTE — PROGRESS NOTES
[facility]   Physical Therapy Treatment Note      Name: Harrison FU [facility] Number: [id number]    Therapy Diagnosis:   Encounter Diagnoses   Name Primary?    Pain Yes    Weakness     Gait difficulty        Physician: Brenda Henson DO    Visit Date: 6/12/2024    Physician Orders: PT Eval and Treat   Medical Diagnosis from Referral:  Closed displaced segmental fracture of shaft of right tibia with routine healing, subsequent encounter   Closed fracture of shaft of right fibula with routine healing, unspecified fracture morphology, subsequent encounter  Evaluation Date: 3/13/2024  Authorization Period Expiration: 12/31/2024  Plan of Care Expiration: 7/12/24   Progress Note Due: 30 days  Visit # / Visits authorized: 5/ 20 + eval (episode 20)  FOTO: 2/3  Precautions: Standard, status post ORIF of right tibia with intramedullary nail with DOS on 4/1/24. You may wean out of the boot as tolerated.       *Do wear it if it is slippery/wet.  You may wean off of the walker as tolerated- but progress to a cane first.  You may need the boot/walker for longer expeditions.     PTA Visit #: 1/5    Time In: 4:50pm  Time Out: 5:50pm  Total Time: 32 minutes    Subjective     Patient reports: went home Saturday night without her cane and did not realize it until later when she got home. Patient reports she walked a fair amount without it and did not notice her pain in ankle/foot. Patient reports continuing to be fearful of falling outside of that evening and continues to use her cane everyday. Patient reports having a follow up with MD on Monday.   She was partially compliant with home exercise program.  Response to previous treatment: soreness  Functional change: continuing to have increased activity with work and family    Pain: 4/10  Location: right foot      Objective    4/26/24 R knee AROM 0-115  4/10/24 R knee AROM 0- 105  4/10/24 FOTO completed       Treatment     Harrison received the treatments  "listed below:      therapeutic exercises to develop strength, endurance, ROM, and flexibility for 10 minutes including:    Nustep 7 minutes  Incline gastroc stretch 5 x15"  secs  Forward lunge to stairs for ankle DF 10 x5"  LAQ; 4# 2 x10  Sidelying hip abduction; 2 x10  Sidelying clams; 2 x15  Bridges; x30  Seated heel slides; 2 x10  Standing heel raises 4 x 5      manual therapy techniques: Myofacial release and Soft tissue Mobilization were applied to the: right lower extremity for 00 minutes, including:  Knee PROM  Ankle PROM  Soft Tissue Mobilization around patella  Patella Mobs  Effleurage    neuromuscular re-education activities to improve: Balance, Coordination, Kinesthetic, and Proprioception for 10 minutes. The following activities were included:    BAPs in sitting 2 min fwd/back, side/side, cw/ccw  Shuttle DL 5 bands 3 minutes  SLR; 2 x15 B  Standing hip 3 way 2x10 each  Gait in // bars without cane 3x    therapeutic activities to improve functional performance for 12 minutes, including:    Sit to stands from 20" box 3 x10  Lateral stepping 4 laps of 15' down and back  Ambulation around clinic with cane assist as needed 2 laps      Gait training for 00 minutes including:    Rocking horse for proper mechanics and equalizing step lengths  Ambulating without upper extremity assistance in // bars gradually increasing marlena as tolerate  Ambulating towards mirror to help with posture, mechanics, and natural     Patient Education and Home Exercises       Education provided:   Written Home Exercises Provided: Patient instructed to cont prior HEP. Exercises were reviewed and Harrison was able to demonstrate them prior to the end of the session.  Harrison demonstrated good  understanding of the education provided. See Electronic Medical Record under Patient Instructions for exercises provided during therapy sessions    Assessment     Discussed with patient about asking MD about anterior ankle pain next week for their " recommendations and medical opinion. Patient continues to demonstrate antalgic gait when attempting to ambulate with normal marlena. Continued improvements in gait mechanics with decreased pain present when she decreased her marlena. Patient continues to demonstrate limitations of pain with ankle dorsiflexion activities. Continued improvements of tolerance to hip and lower extremity strengthening activities present during treatment.    Harrison Is progressing well towards her goals.   Patient prognosis is Good.     Patient will continue to benefit from skilled outpatient physical therapy to address the deficits listed in the problem list box on initial evaluation, provide pt/family education and to maximize pt's level of independence in the home and community environment.     Patient's spiritual, cultural and educational needs considered and pt agreeable to plan of care and goals.     Anticipated barriers to physical therapy: attendance     Goals:   STG's 2 weeks  Patient will be independent with 50% of HEP MET 4/10/24     LTG's 10 weeks  Patient will improve FOTO functional measure  score  to 65 %  in order to improve overall QOL & return to PLOF Progressing 44 4/10/24/ 62 5/29/24  2. Patient will report an overall decrease in pain with ADL's and functional mobility Progressing  3. Patient will increase strength by at least 1/2 muscle grade in affected musculature to improve functional mobility MET 5/29/24  4. Patient will improve R knee and ankle ROM to improve functional mobility and ACTIVITIES OF DAILY LIVING's Progressing   5. Patient will be more aware of posture throughout the day to reduce stress  and maintain optimal alignment of the spine Progressing  6. Patient will be independent with HEP Progressing  7. Patient will increase strength by at least 1 muscle grade in affected musculature to improve functional mobility  Plan   Plan of care Certification: 3/13/2024 to 5/24/24 extended to 7/12/24      Outpatient  Physical Therapy 2-3  times weekly for 10 weeks to include the following interventions: Electrical Stimulation PRN, Gait Training, Manual Therapy, Moist Heat/ Ice, Neuromuscular Re-ed, Patient Education, Self Care, Therapeutic Activities, Therapeutic Exercise, and Ultrasound, ASTYM, Kinesiotaping PRN, Functional Dry Needling    Rohan Dominguez, PTA

## 2024-06-14 ENCOUNTER — CLINICAL SUPPORT (OUTPATIENT)
Dept: REHABILITATION | Facility: HOSPITAL | Age: 49
End: 2024-06-14
Payer: COMMERCIAL

## 2024-06-14 DIAGNOSIS — R52 PAIN: Primary | ICD-10-CM

## 2024-06-14 DIAGNOSIS — R53.1 WEAKNESS: ICD-10-CM

## 2024-06-14 DIAGNOSIS — R26.9 GAIT DIFFICULTY: ICD-10-CM

## 2024-06-14 PROCEDURE — 97112 NEUROMUSCULAR REEDUCATION: CPT | Mod: PN,CQ

## 2024-06-14 PROCEDURE — 97530 THERAPEUTIC ACTIVITIES: CPT | Mod: PN,CQ

## 2024-06-14 PROCEDURE — 97110 THERAPEUTIC EXERCISES: CPT | Mod: PN,CQ

## 2024-06-14 NOTE — PROGRESS NOTES
[facility]   Physical Therapy Treatment Note      Name: Harrison Estevez  [facility] Number: [id number]    Therapy Diagnosis:   Encounter Diagnoses   Name Primary?    Pain Yes    Weakness     Gait difficulty        Physician: Brenda Henson DO    Visit Date: 6/14/2024    Physician Orders: PT Eval and Treat   Medical Diagnosis from Referral:  Closed displaced segmental fracture of shaft of right tibia with routine healing, subsequent encounter   Closed fracture of shaft of right fibula with routine healing, unspecified fracture morphology, subsequent encounter  Evaluation Date: 3/13/2024  Authorization Period Expiration: 12/31/2024  Plan of Care Expiration: 7/12/24   Progress Note Due: 30 days  Visit # / Visits authorized: 6/ 20 + eval (episode 21)  FOTO: 2/3  Precautions: Standard, status post ORIF of right tibia with intramedullary nail with DOS on 4/1/24. You may wean out of the boot as tolerated.       *Do wear it if it is slippery/wet.  You may wean off of the walker as tolerated- but progress to a cane first.  You may need the boot/walker for longer expeditions.     PTA Visit #: 1/5    Time In: 3:00pm  Time Out: 4:02pm  Total Time: 29 minutes    Subjective     Patient reports: received her velcro lower extremity wraps prior to therapy visit. Patient reports since last visit has been attempting to be more aware of her gait mechanics as well as ambulate around the house and office without relying upon the standard cane constantly. Patient reports anteromedial ankle pain is still present but denies exacerbations since attempting to ambulate properly.  She was partially compliant with home exercise program.  Response to previous treatment: soreness  Functional change: continuing to have increased activity with work and family    Pain: 4/10  Location: right foot      Objective    4/26/24 R knee AROM 0-115  4/10/24 R knee AROM 0- 105  4/10/24 FOTO completed       Treatment     Harrison  "received the treatments listed below:      therapeutic exercises to develop strength, endurance, ROM, and flexibility for 10 minutes including:    Nustep 7 minutes resistance 4  Incline gastroc stretch 5 x15"  secs  Forward lunge to stairs for ankle DF 10 x5"  LAQ; 4# 2 x10  Sidelying hip abduction; 2 x10  Sidelying clams; 2 x15  Bridges; x30  Seated heel slides; 2 x10  Standing heel raises 4 x 5      manual therapy techniques: Myofacial release and Soft tissue Mobilization were applied to the: right lower extremity for 00 minutes, including:  Knee PROM  Ankle PROM  Soft Tissue Mobilization around patella  Patella Mobs  Effleurage    neuromuscular re-education activities to improve: Balance, Coordination, Kinesthetic, and Proprioception for 10 minutes. The following activities were included:    BAPs in sitting 2 min fwd/back, side/side, cw/ccw  Shuttle DL 5 bands 3 minutes  SLR; 2 x15 B  Standing hip 3 way 2x10 each  Gait in // bars without cane 3x    therapeutic activities to improve functional performance for 09 minutes, including:    Sit to stands from 20" box 3 x10  Lateral stepping 4 laps of 15' down and back  Ambulation around clinic with cane assist as needed 2 laps      Gait training for 00 minutes including:    Rocking horse for proper mechanics and equalizing step lengths  Ambulating without upper extremity assistance in // bars gradually increasing marlena as tolerate  Ambulating towards mirror to help with posture, mechanics, and natural     Patient Education and Home Exercises       Education provided:   Written Home Exercises Provided: Patient instructed to cont prior HEP. Exercises were reviewed and Harrison was able to demonstrate them prior to the end of the session.  Harveyda demonstrated good  understanding of the education provided. See Electronic Medical Record under Patient Instructions for exercises provided during therapy sessions    Assessment     Patient arrived with new lymphedema velcro " compression wraps to therapy today. Lymphedema expert and Physical Therapist Carol Gudino was present at therapy session and was able to assist patient with proper donning and use of wraps. Following donning of lower leg wrap patient demonstrated improvements in comfort with ambulation and weightbearing activities. Patient demonstrated improved motivation participating with therapy with improved comfort provided by the wrap. Patient demonstrates carryover from previous visit and was able to ambulate short distances in clinic without standard cane, proper step length and marlena, as well as improved gait mechanics without compensation. Discussed with patient about attempting to be consistent with activity over the next few days in attempt to continue improving tolerance to activity. Plan to continue progression further into function as patient is able to tolerate.    Harrison Is progressing well towards her goals.   Patient prognosis is Good.     Patient will continue to benefit from skilled outpatient physical therapy to address the deficits listed in the problem list box on initial evaluation, provide pt/family education and to maximize pt's level of independence in the home and community environment.     Patient's spiritual, cultural and educational needs considered and pt agreeable to plan of care and goals.     Anticipated barriers to physical therapy: attendance     Goals:   STG's 2 weeks  Patient will be independent with 50% of HEP MET 4/10/24     LTG's 10 weeks  Patient will improve FOTO functional measure  score  to 65 %  in order to improve overall QOL & return to PLOF Progressing 44 4/10/24/ 62 5/29/24  2. Patient will report an overall decrease in pain with ADL's and functional mobility Progressing  3. Patient will increase strength by at least 1/2 muscle grade in affected musculature to improve functional mobility MET 5/29/24  4. Patient will improve R knee and ankle ROM to improve functional mobility  and ACTIVITIES OF DAILY LIVING's Progressing   5. Patient will be more aware of posture throughout the day to reduce stress  and maintain optimal alignment of the spine Progressing  6. Patient will be independent with HEP Progressing  7. Patient will increase strength by at least 1 muscle grade in affected musculature to improve functional mobility  Plan   Plan of care Certification: 3/13/2024 to 5/24/24 extended to 7/12/24      Outpatient Physical Therapy 2-3  times weekly for 10 weeks to include the following interventions: Electrical Stimulation PRN, Gait Training, Manual Therapy, Moist Heat/ Ice, Neuromuscular Re-ed, Patient Education, Self Care, Therapeutic Activities, Therapeutic Exercise, and Ultrasound, ASTYM, Kinesiotaping PRN, Functional Dry Needling    Rohan Dominguez, PTA

## 2024-06-19 ENCOUNTER — CLINICAL SUPPORT (OUTPATIENT)
Dept: REHABILITATION | Facility: HOSPITAL | Age: 49
End: 2024-06-19
Payer: COMMERCIAL

## 2024-06-19 DIAGNOSIS — R26.9 GAIT DIFFICULTY: ICD-10-CM

## 2024-06-19 DIAGNOSIS — R53.1 WEAKNESS: ICD-10-CM

## 2024-06-19 DIAGNOSIS — R52 PAIN: Primary | ICD-10-CM

## 2024-06-19 PROCEDURE — 97530 THERAPEUTIC ACTIVITIES: CPT | Mod: PN

## 2024-06-19 PROCEDURE — 97112 NEUROMUSCULAR REEDUCATION: CPT | Mod: PN

## 2024-06-19 PROCEDURE — 97110 THERAPEUTIC EXERCISES: CPT | Mod: PN

## 2024-06-19 NOTE — PROGRESS NOTES
"[facility]   Physical Therapy Treatment Note      Name: Harrison Estevez  [facility] Number: [id number]    Therapy Diagnosis:   Encounter Diagnoses   Name Primary?    Pain Yes    Weakness     Gait difficulty        Physician: Brenda Henson DO    Visit Date: 6/19/2024    Physician Orders: PT Eval and Treat   Medical Diagnosis from Referral:  Closed displaced segmental fracture of shaft of right tibia with routine healing, subsequent encounter   Closed fracture of shaft of right fibula with routine healing, unspecified fracture morphology, subsequent encounter  Evaluation Date: 3/13/2024  Authorization Period Expiration: 12/31/2024  Plan of Care Expiration: 7/12/24   Progress Note Due: 30 days  Visit # / Visits authorized: 7/ 20 + eval (episode 21)  FOTO: 2/3  Precautions: Standard, status post ORIF of right tibia with intramedullary nail with DOS on 4/1/24. You may wean out of the boot as tolerated.       *Do wear it if it is slippery/wet.  You may wean off of the walker as tolerated- but progress to a cane first.  You may need the boot/walker for longer expeditions.     PTA Visit #: 0/5    Time In:4:07  Time Out:5:01  Total Time: 54 minutes    Subjective     Patient reports: she is feeling much better since wearing compression brace on R LE.   She was partially compliant with home exercise program.  Response to previous treatment: soreness  Functional change: continuing to have increased activity with work and family    Pain: 2/10  Location: right top of  foot      Objective    4/26/24 R knee AROM 0-115  4/10/24 R knee AROM 0- 105  4/10/24 FOTO completed       Treatment     Harrison received the treatments listed below:      therapeutic exercises to develop strength, endurance, ROM, and flexibility for 8 minutes including:    Bike for ROM, strength, and endurance x 8 minutes R:2    deferred  Incline gastroc stretch 5 x15"  secs  Forward lunge to stairs for ankle DF 10 x5"  LAQ; 4# 2 " x10  Sidelying hip abduction; 2 x10  Sidelying clams; 2 x15  Bridges; x30  Seated heel slides; 2 x10  Standing heel raises 4 x 5      manual therapy techniques: Myofacial release and Soft tissue Mobilization were applied to the: right lower extremity for 00 minutes, including:  Knee PROM  Ankle PROM  Soft Tissue Mobilization around patella  Patella Mobs  Effleurage    neuromuscular re-education activities to improve: Balance, Coordination, Kinesthetic, and Proprioception for 23 minutes. The following activities were included:  Pregait WS activities in // bars with light to no UE support 2 min f/b each way  Wobble board f/b 3 minutes with UE support  Standing heel raises 3 x 10 reps with no UE support    therapeutic activities to improve functional performance for 23 minutes, including:  -Negotiation of 4 steps x 4 using HR's with step to pattern/ alternating pattern too difficult especially descending steps  -Sit to stands from 18 in box 3 x 10 reps  Push and pull sled with no added #      Gait training for 00 minutes including:    Rocking horse for proper mechanics and equalizing step lengths  Ambulating without upper extremity assistance in // bars gradually increasing marlena as tolerate  Ambulating towards mirror to help with posture, mechanics, and natural     Patient Education and Home Exercises       Education provided:   Written Home Exercises Provided: Patient instructed to cont prior HEP. Exercises were reviewed and Harrison was able to demonstrate them prior to the end of the session.  Harrison demonstrated good  understanding of the education provided. See Electronic Medical Record under Patient Instructions for exercises provided during therapy sessions    Assessment   Patient presents to clinic with decrease pain complaints and improved gait pattern using SC. PT worked on ROM and gait mechanics to improve gait pattern. Patient is wanting to progress and walk without AD by Mid July for vacation. Patient  progressed with functional activities today. She is able to negotiate a flight of steps with step to pattern. She was tired after the session. PT will progress patient as tolerated.         Harrison Is progressing well towards her goals.   Patient prognosis is Good.     Patient will continue to benefit from skilled outpatient physical therapy to address the deficits listed in the problem list box on initial evaluation, provide pt/family education and to maximize pt's level of independence in the home and community environment.     Patient's spiritual, cultural and educational needs considered and pt agreeable to plan of care and goals.     Anticipated barriers to physical therapy: attendance     Goals:   STG's 2 weeks  Patient will be independent with 50% of HEP MET 4/10/24     LTG's 10 weeks  Patient will improve FOTO functional measure  score  to 65 %  in order to improve overall QOL & return to PLOF Progressing 44 4/10/24/ 62 5/29/24  2. Patient will report an overall decrease in pain with ADL's and functional mobility Progressing  3. Patient will increase strength by at least 1/2 muscle grade in affected musculature to improve functional mobility MET 5/29/24  4. Patient will improve R knee and ankle ROM to improve functional mobility and ACTIVITIES OF DAILY LIVING's Progressing   5. Patient will be more aware of posture throughout the day to reduce stress  and maintain optimal alignment of the spine Progressing  6. Patient will be independent with HEP Progressing  7. Patient will increase strength by at least 1 muscle grade in affected musculature to improve functional mobility  Plan   Plan of care Certification: 5/29/24-  7/12/24      Outpatient Physical Therapy 2-3  times weekly for 10 weeks to include the following interventions: Electrical Stimulation PRN, Gait Training, Manual Therapy, Moist Heat/ Ice, Neuromuscular Re-ed, Patient Education, Self Care, Therapeutic Activities, Therapeutic Exercise, and  Ultrasound, ASTYM, Kinesiotaping PRN, Functional Dry Needling    Rosa Hernandez, PT

## 2024-06-21 ENCOUNTER — CLINICAL SUPPORT (OUTPATIENT)
Dept: REHABILITATION | Facility: HOSPITAL | Age: 49
End: 2024-06-21
Payer: COMMERCIAL

## 2024-06-21 DIAGNOSIS — R52 PAIN: Primary | ICD-10-CM

## 2024-06-21 DIAGNOSIS — R53.1 WEAKNESS: ICD-10-CM

## 2024-06-21 DIAGNOSIS — R26.9 GAIT DIFFICULTY: ICD-10-CM

## 2024-06-21 PROCEDURE — 97110 THERAPEUTIC EXERCISES: CPT | Mod: PN,CQ

## 2024-06-21 PROCEDURE — 97112 NEUROMUSCULAR REEDUCATION: CPT | Mod: PN,CQ

## 2024-06-21 PROCEDURE — 97530 THERAPEUTIC ACTIVITIES: CPT | Mod: PN,CQ

## 2024-06-21 NOTE — PROGRESS NOTES
[facility]   Physical Therapy Treatment Note      Name: Harrison Estevez  [facility] Number: [id number]    Therapy Diagnosis:   Encounter Diagnoses   Name Primary?    Pain Yes    Weakness     Gait difficulty        Physician: Bernda Henson DO    Visit Date: 6/21/2024    Physician Orders: PT Eval and Treat   Medical Diagnosis from Referral:  Closed displaced segmental fracture of shaft of right tibia with routine healing, subsequent encounter   Closed fracture of shaft of right fibula with routine healing, unspecified fracture morphology, subsequent encounter  Evaluation Date: 3/13/2024  Authorization Period Expiration: 12/31/2024  Plan of Care Expiration: 7/12/24   Progress Note Due: 30 days  Visit # / Visits authorized: 8/ 20 + eval (episode 21)  FOTO: 2/3  Precautions: Standard, status post ORIF of right tibia with intramedullary nail with DOS on 4/1/24. You may wean out of the boot as tolerated.       *Do wear it if it is slippery/wet.  You may wean off of the walker as tolerated- but progress to a cane first.  You may need the boot/walker for longer expeditions.     PTA Visit #: 1/5    Time In: 3:00pm  Time Out: 4:00pm  Total Time: 30 minutes    Subjective     Patient reports: continuing to find improvements in right anteromedial ankle pain with use of compression brace received for lymphedema. Patient does report attempting to ambulate without the cane more often at home and work but still likes to have it with her. Patient states interest in getting back to where she could wear heels or at least wedges. Patient also states having follow up with MD on Monday.  She was partially compliant with home exercise program.  Response to previous treatment: soreness  Functional change: continuing to have increased activity with work and family    Pain: 2/10  Location: right top of  foot      Objective    4/26/24 R knee AROM 0-115  4/10/24 R knee AROM 0- 105  4/10/24 FOTO completed    "    Treatment     Harrison received the treatments listed below:      therapeutic exercises to develop strength, endurance, ROM, and flexibility for 04 minutes including:    Bike for ROM, strength, and endurance x 8 minutes R:2  Incline gastroc stretch 5 x15"  secs  Forward lunge to stairs for ankle DF 10 x5"  Bridges; x30  Seated heel slides; 2 x10  Standing heel raises 4 x 5      manual therapy techniques: Myofacial release and Soft tissue Mobilization were applied to the: right lower extremity for 09 minutes, including:  Ankle PROM  Manual gastroc stretch    neuromuscular re-education activities to improve: Balance, Coordination, Kinesthetic, and Proprioception for 08 minutes. The following activities were included:  Pregait WS activities in // bars with light to no UE support 2 min f/b each way  Wobble board f/b 3 minutes with UE support  Standing heel raises 3 x 10 reps with no UE support    therapeutic activities to improve functional performance for 09 minutes, including:    Negotiation of 4 steps x 4 using HR's with step to pattern/ alternating pattern too difficult especially descending steps  Sit to stands from 18 in box 3 x 10 reps  Push and pull sled with no added #      Gait training for 00 minutes including:    Rocking horse for proper mechanics and equalizing step lengths  Ambulating without upper extremity assistance in // bars gradually increasing marlena as tolerate  Ambulating towards mirror to help with posture, mechanics, and natural     Patient Education and Home Exercises       Education provided:   Written Home Exercises Provided: Patient instructed to cont prior HEP. Exercises were reviewed and Harrison was able to demonstrate them prior to the end of the session.  Harrison demonstrated good  understanding of the education provided. See Electronic Medical Record under Patient Instructions for exercises provided during therapy sessions    Assessment     Included manual to ankle in attempt to " improve mobility. Patient demonstrated limitations of pain in anteromedial ankle with passive Range of Motion in all directions especially plantar flexion and eversion. Following manual patient demonstrated limitations of pain with active Range of Motion that did improve as she continued. Patient was unable to perform standing heel raises today without increasing pain requiring modification to bilateral stance and single heel raise. Discussed with patient about communicating exacerbations and consistency of pain to MD at follow up for any potential solutions. Patient would benefit from continued ankle Range of Motion and strengthening to further improve quality of life.    Harrison Is progressing well towards her goals.   Patient prognosis is Good.     Patient will continue to benefit from skilled outpatient physical therapy to address the deficits listed in the problem list box on initial evaluation, provide pt/family education and to maximize pt's level of independence in the home and community environment.     Patient's spiritual, cultural and educational needs considered and pt agreeable to plan of care and goals.     Anticipated barriers to physical therapy: attendance     Goals:   STG's 2 weeks  Patient will be independent with 50% of HEP MET 4/10/24     LTG's 10 weeks  Patient will improve FOTO functional measure  score  to 65 %  in order to improve overall QOL & return to PLOF Progressing 44 4/10/24/ 62 5/29/24  2. Patient will report an overall decrease in pain with ADL's and functional mobility Progressing  3. Patient will increase strength by at least 1/2 muscle grade in affected musculature to improve functional mobility MET 5/29/24  4. Patient will improve R knee and ankle ROM to improve functional mobility and ACTIVITIES OF DAILY LIVING's Progressing   5. Patient will be more aware of posture throughout the day to reduce stress  and maintain optimal alignment of the spine Progressing  6. Patient will be  independent with HEP Progressing  7. Patient will increase strength by at least 1 muscle grade in affected musculature to improve functional mobility  Plan   Plan of care Certification: 5/29/24-  7/12/24      Outpatient Physical Therapy 2-3  times weekly for 10 weeks to include the following interventions: Electrical Stimulation PRN, Gait Training, Manual Therapy, Moist Heat/ Ice, Neuromuscular Re-ed, Patient Education, Self Care, Therapeutic Activities, Therapeutic Exercise, and Ultrasound, ASTYM, Kinesiotaping PRN, Functional Dry Needling    Rohan Dominguez, PTA

## 2024-06-26 ENCOUNTER — OFFICE VISIT (OUTPATIENT)
Dept: ORTHOPEDICS | Facility: CLINIC | Age: 49
End: 2024-06-26
Payer: COMMERCIAL

## 2024-06-26 ENCOUNTER — HOSPITAL ENCOUNTER (OUTPATIENT)
Dept: RADIOLOGY | Facility: HOSPITAL | Age: 49
Discharge: HOME | End: 2024-06-26
Attending: ORTHOPAEDIC SURGERY
Payer: COMMERCIAL

## 2024-06-26 VITALS
HEIGHT: 63 IN | SYSTOLIC BLOOD PRESSURE: 113 MMHG | BODY MASS INDEX: 39.69 KG/M2 | WEIGHT: 224 LBS | DIASTOLIC BLOOD PRESSURE: 73 MMHG | TEMPERATURE: 97 F | HEART RATE: 78 BPM

## 2024-06-26 DIAGNOSIS — S84.01XD: ICD-10-CM

## 2024-06-26 DIAGNOSIS — I89.0 LYMPHEDEMA: ICD-10-CM

## 2024-06-26 DIAGNOSIS — M79.661 PAIN IN RIGHT LOWER LEG: ICD-10-CM

## 2024-06-26 DIAGNOSIS — G57.31 RIGHT PERONEAL NERVE PALSY: Primary | ICD-10-CM

## 2024-06-26 DIAGNOSIS — S82.401D CLOSED FRACTURE OF SHAFT OF RIGHT FIBULA WITH ROUTINE HEALING, UNSPECIFIED FRACTURE MORPHOLOGY, SUBSEQUENT ENCOUNTER: ICD-10-CM

## 2024-06-26 DIAGNOSIS — S82.261D CLOSED DISPLACED SEGMENTAL FRACTURE OF SHAFT OF RIGHT TIBIA WITH ROUTINE HEALING, SUBSEQUENT ENCOUNTER: ICD-10-CM

## 2024-06-26 PROCEDURE — 3074F SYST BP LT 130 MM HG: CPT | Mod: CPTII,S$GLB,, | Performed by: ORTHOPAEDIC SURGERY

## 2024-06-26 PROCEDURE — 73590 X-RAY EXAM OF LOWER LEG: CPT | Mod: 26,RT,, | Performed by: RADIOLOGY

## 2024-06-26 PROCEDURE — 73590 X-RAY EXAM OF LOWER LEG: CPT | Mod: TC,RT

## 2024-06-26 PROCEDURE — 3008F BODY MASS INDEX DOCD: CPT | Mod: CPTII,S$GLB,, | Performed by: ORTHOPAEDIC SURGERY

## 2024-06-26 PROCEDURE — 99999 PR PBB SHADOW E&M-EST. PATIENT-LVL IV: CPT | Mod: PBBFAC,,, | Performed by: ORTHOPAEDIC SURGERY

## 2024-06-26 PROCEDURE — 3078F DIAST BP <80 MM HG: CPT | Mod: CPTII,S$GLB,, | Performed by: ORTHOPAEDIC SURGERY

## 2024-06-26 PROCEDURE — 1159F MED LIST DOCD IN RCRD: CPT | Mod: CPTII,S$GLB,, | Performed by: ORTHOPAEDIC SURGERY

## 2024-06-26 PROCEDURE — 99213 OFFICE O/P EST LOW 20 MIN: CPT | Mod: S$GLB,,, | Performed by: ORTHOPAEDIC SURGERY

## 2024-06-26 PROCEDURE — 1160F RVW MEDS BY RX/DR IN RCRD: CPT | Mod: CPTII,S$GLB,, | Performed by: ORTHOPAEDIC SURGERY

## 2024-06-26 RX ORDER — NAPROXEN 500 MG/1
500 TABLET ORAL 2 TIMES DAILY
COMMUNITY
Start: 2024-06-26

## 2024-06-26 RX ORDER — GABAPENTIN 300 MG/1
300 CAPSULE ORAL NIGHTLY
Qty: 30 CAPSULE | Refills: 2 | Status: SHIPPED | OUTPATIENT
Start: 2024-06-26 | End: 2024-09-24

## 2024-06-26 NOTE — PATIENT INSTRUCTIONS
Continue doing the great work with PT!     The nerves are still waking up!  Which is both good and annoying - work on desensitiving the foot by rubbing it with lotion/cream or soft cloths etc.

## 2024-06-26 NOTE — PROGRESS NOTES
"   Date of Surgery     (2024)  Surgery                   RIGHT Tibia IMN                                Chief Complaint: Pain of the Right Lower Leg (Foot is beginning to experience pain)      HPI: Harrison Estevez is a 49 y.o. female who is here for follow-up of her surgery.  She reports the bone is not hurting anymore.  She still has some burning pain at night.  The knee pain is improving.      The lymphedema wraps are really helping her pain.     She feels like her foot is "tilting" with working.     Past Medical History:   Diagnosis Date    Abnormal Pap smear 2011    Cryosurgery done    General anesthetics causing adverse effect in therapeutic use     slow to wake up following        Past Surgical History:   Procedure Laterality Date    ANKLE SURGERY      APPENDECTOMY      GYNECOLOGIC CRYOSURGERY  2011    HYSTERECTOMY      INSERTION OF INTRAMEDULLARY NAIL INTO TIBIA Right 2024    Procedure: INSERTION, INTRAMEDULLARY JANIS, TIBIA;  Surgeon: Brenda Henson DO;  Location: [facility] OR;  Service: Orthopedics;  Laterality: Right;    OOPHORECTOMY      ROBOT-ASSISTED LAPAROSCOPIC ABDOMINAL HYSTERECTOMY USING DA NICKY XI N/A 2023    Procedure: XI ROBOTIC HYSTERECTOMY;  Surgeon: BHUMI Shine MD;  Location: [facility] OR;  Service: OB/GYN;  Laterality: N/A;       Family History   Problem Relation Name Age of Onset    Breast cancer Maternal Grandmother      Breast cancer Maternal Aunt         Social History     Socioeconomic History    Marital status:    Tobacco Use    Smoking status: Never    Smokeless tobacco: Never   Substance and Sexual Activity    Alcohol use: Yes     Comment: occ    Drug use: No    Sexual activity: Yes     Partners: Male       Current Outpatient Medications   Medication Instructions    gabapentin (NEURONTIN) 300 mg, Oral, Nightly    naproxen (NAPROSYN) 500 mg, Oral, 2 times daily       Review of patient's allergies indicates:   Allergen Reactions    " Penicillanic sulfone bl beta-lactamase inhibitors Hives    Penicillins        Physical Exam:     Wt Readings from Last 1 Encounters:   06/26/24 101.6 kg (223 lb 15.8 oz)     Temp Readings from Last 1 Encounters:   06/26/24 96.9 °F (36.1 °C) (Oral)     BP Readings from Last 1 Encounters:   06/26/24 113/73     Pulse Readings from Last 1 Encounters:   06/26/24 78           General Appearance:   NAD, well appearing, cooperative    Neurologic:  Alert and oriented x3    Pysch:  Age appropriate    GAIT:  Smooth concentric gait without assistive device      Musculoskeletal:     Right leg:  Incisions nicely healed.  No erythema. Swelling improved.  Ecchymosis resolved.  AROM of knee is full.  Good AROM of ankle.  Ankle plantar/dorsiflexion 4/5.  Ankle inversion/eversion 4/5.  Toe extension 2-35.  Toe flexion 5/5.   Pedal edema significantly improved.  Sensation about the calf normal.  Decreased sensation in dorsal 1st webspace and toes still.  Dorsal foot normal.  Plantar foot nearly normal. Cap refill <2s.                        IMAGING: LEFT Tibia XR  IMN and locking screws in good position without signs of loosening.  Good interval healing noted.      Assessment:       Encounter Diagnoses   Name Primary?    Right peroneal nerve palsy Yes    Injury of right tibial nerve, subsequent encounter     Closed displaced segmental fracture of shaft of right tibia with routine healing, subsequent encounter     Closed fracture of shaft of right fibula with routine healing, unspecified fracture morphology, subsequent encounter     Pain in right lower leg     Lymphedema               DISCUSSION:   Patient's symptoms, imaging, diagnosis and prognosis reviewed and discussed.  Discussed  healing progression.   Discussed weight bearing status and the progression Discussed the need for compliance with treatment.     Patient given an opportunity to ask questions.       Plan:       Harrison was seen today for pain.    Diagnoses and all orders  for this visit:    Right peroneal nerve palsy    Injury of right tibial nerve, subsequent encounter    Closed displaced segmental fracture of shaft of right tibia with routine healing, subsequent encounter    Closed fracture of shaft of right fibula with routine healing, unspecified fracture morphology, subsequent encounter    Pain in right lower leg    Lymphedema    Other orders  -     gabapentin (NEURONTIN) 300 MG capsule; Take 1 capsule (300 mg total) by mouth every evening.      Patient Instructions   Continue doing the great work with PT!     The nerves are still waking up!  Which is both good and annoying - work on desensitiving the foot by rubbing it with lotion/cream or soft cloths etc.        Follow-up in 6 weeks with XRs    The patient understands, chooses and consents to this plan and accepts all   the risks which include but are not limited to the risks mentioned above.             Brenda Henson DO, CAQSM, [facility]  Orthopaedic Surgeon

## 2024-06-27 ENCOUNTER — CLINICAL SUPPORT (OUTPATIENT)
Dept: REHABILITATION | Facility: HOSPITAL | Age: 49
End: 2024-06-27
Payer: COMMERCIAL

## 2024-06-27 DIAGNOSIS — R52 PAIN: Primary | ICD-10-CM

## 2024-06-27 DIAGNOSIS — R26.9 GAIT DIFFICULTY: ICD-10-CM

## 2024-06-27 DIAGNOSIS — R53.1 WEAKNESS: ICD-10-CM

## 2024-06-27 PROCEDURE — 97112 NEUROMUSCULAR REEDUCATION: CPT | Mod: PN,CQ

## 2024-06-27 PROCEDURE — 97530 THERAPEUTIC ACTIVITIES: CPT | Mod: PN,CQ

## 2024-06-27 PROCEDURE — 97110 THERAPEUTIC EXERCISES: CPT | Mod: PN,CQ

## 2024-06-27 NOTE — PROGRESS NOTES
[facility]   Physical Therapy Treatment Note      Name: Harrison FU [facility] Number: [id number]    Therapy Diagnosis:   Encounter Diagnoses   Name Primary?    Pain Yes    Weakness     Gait difficulty        Physician: Brenda Henson DO    Visit Date: 6/27/2024    Physician Orders: PT Eval and Treat   Medical Diagnosis from Referral:  Closed displaced segmental fracture of shaft of right tibia with routine healing, subsequent encounter   Closed fracture of shaft of right fibula with routine healing, unspecified fracture morphology, subsequent encounter  Evaluation Date: 3/13/2024  Authorization Period Expiration: 12/31/2024  Plan of Care Expiration: 7/12/24   Progress Note Due: 30 days  Visit # / Visits authorized: 9/ 20 + eval (episode 21)  FOTO: 4/3  Precautions: Standard, status post ORIF of right tibia with intramedullary nail with DOS on 4/1/24. You may wean out of the boot as tolerated.       *Do wear it if it is slippery/wet.  You may wean off of the walker as tolerated- but progress to a cane first.  You may need the boot/walker for longer expeditions.     PTA Visit #: 1/5    Time In: 4:30pm  Time Out: 5:30pm  Total Time: 29 minutes    Subjective     Patient reports: patient reports seeing MD yesterday for an updated Xray. Patient reports MD stated healing is still not 100% but is close. Patient reports MD stated the anteromedial ankle pain she has been getting is from nerves. Patient reports getting a nerve medication that made her oversleep for work but also allowed her to get out of bed without the pain when first weightbearing.  She was partially compliant with home exercise program.  Response to previous treatment:   Functional change: utilizing cane less frequently    Pain: 2/10  Location: right top of  foot      Objective    4/26/24 R knee AROM 0-115  4/10/24 R knee AROM 0- 105  4/10/24 FOTO completed       Treatment     Harrison received the treatments listed below:   "    therapeutic exercises to develop strength, endurance, ROM, and flexibility for 10 minutes including:    Bike for ROM, strength, and endurance x 8 minutes R:2  Towel gastroc stretch 5 x15"  secs  Forward lunge to stairs for ankle DF 10 x5"  Bridges; x30  Seated heel slides x10 combo toe crunches x5  Ankle ABC; 2 sets  Lateral walking in // bars x8    manual therapy techniques: Myofacial release and Soft tissue Mobilization were applied to the: right lower extremity for 00 minutes, including:  Ankle PROM  Manual gastroc stretch    neuromuscular re-education activities to improve: Balance, Coordination, Kinesthetic, and Proprioception for 10 minutes. The following activities were included:    B stance single heel raises alternating to minimize pain; 2 x10  Ambulation without assistance in // bars x5  Retrowalking in // bars without assistance x5  Keymar pickups 2 x1 minute  Toe Waves; 3 x10      therapeutic activities to improve functional performance for 09 minutes, including:    Step Ups no riser, Upper Extremity assist as needed x30 each  Sit to stands from 18 in box 3 x 10 reps  Push and pull sled with no added #      Gait training for 00 minutes including:    Rocking horse for proper mechanics and equalizing step lengths  Ambulating without upper extremity assistance in // bars gradually increasing marlena as tolerate  Ambulating towards mirror to help with posture, mechanics, and natural     Patient Education and Home Exercises       Education provided:   Written Home Exercises Provided: Patient instructed to cont prior HEP. Exercises were reviewed and Harrison was able to demonstrate them prior to the end of the session.  Harveyda demonstrated good  understanding of the education provided. See Electronic Medical Record under Patient Instructions for exercises provided during therapy sessions    Assessment     Continued work attempting to improve ankle mobility and tolerance to weightbearing activities. Continued " limitations of pain present when attempting standing heel raises. Patient was able to ambulate around clinic without standard cane but following treatment utilized standard cane more to minimize the pain present. Discussed with patient about consistency of HEP and following advise of MD daily for greater likelihood of progression.     Harrison Is progressing well towards her goals.   Patient prognosis is Good.     Patient will continue to benefit from skilled outpatient physical therapy to address the deficits listed in the problem list box on initial evaluation, provide pt/family education and to maximize pt's level of independence in the home and community environment.     Patient's spiritual, cultural and educational needs considered and pt agreeable to plan of care and goals.     Anticipated barriers to physical therapy: attendance     Goals:   STG's 2 weeks  Patient will be independent with 50% of HEP MET 4/10/24     LTG's 10 weeks  Patient will improve FOTO functional measure  score  to 65 %  in order to improve overall QOL & return to PLOF Progressing 44 4/10/24/ 62 5/29/24  2. Patient will report an overall decrease in pain with ADL's and functional mobility Progressing  3. Patient will increase strength by at least 1/2 muscle grade in affected musculature to improve functional mobility MET 5/29/24  4. Patient will improve R knee and ankle ROM to improve functional mobility and ACTIVITIES OF DAILY LIVING's Progressing   5. Patient will be more aware of posture throughout the day to reduce stress  and maintain optimal alignment of the spine Progressing  6. Patient will be independent with HEP Progressing  7. Patient will increase strength by at least 1 muscle grade in affected musculature to improve functional mobility  Plan   Plan of care Certification: 5/29/24-  7/12/24      Outpatient Physical Therapy 2-3  times weekly for 10 weeks to include the following interventions: Electrical Stimulation PRN, Gait  Training, Manual Therapy, Moist Heat/ Ice, Neuromuscular Re-ed, Patient Education, Self Care, Therapeutic Activities, Therapeutic Exercise, and Ultrasound, ASTYM, Kinesiotaping PRN, Functional Dry Needling    Rohan Dominguez, PTA

## 2024-07-02 ENCOUNTER — CLINICAL SUPPORT (OUTPATIENT)
Dept: REHABILITATION | Facility: HOSPITAL | Age: 49
End: 2024-07-02
Payer: COMMERCIAL

## 2024-07-02 DIAGNOSIS — R52 PAIN: Primary | ICD-10-CM

## 2024-07-02 DIAGNOSIS — R26.9 GAIT DIFFICULTY: ICD-10-CM

## 2024-07-02 DIAGNOSIS — R53.1 WEAKNESS: ICD-10-CM

## 2024-07-02 PROCEDURE — 97112 NEUROMUSCULAR REEDUCATION: CPT | Mod: PN,CQ

## 2024-07-02 PROCEDURE — 97110 THERAPEUTIC EXERCISES: CPT | Mod: PN,CQ

## 2024-07-02 PROCEDURE — 97530 THERAPEUTIC ACTIVITIES: CPT | Mod: PN,CQ

## 2024-07-02 NOTE — PROGRESS NOTES
"[facility]   Physical Therapy Treatment Note      Name: Harrison Estevez  [facility] Number: [id number]    Therapy Diagnosis:   Encounter Diagnoses   Name Primary?    Pain Yes    Weakness     Gait difficulty        Physician: Brenda Henson DO    Visit Date: 7/2/2024    Physician Orders: PT Eval and Treat   Medical Diagnosis from Referral:  Closed displaced segmental fracture of shaft of right tibia with routine healing, subsequent encounter   Closed fracture of shaft of right fibula with routine healing, unspecified fracture morphology, subsequent encounter  Evaluation Date: 3/13/2024  Authorization Period Expiration: 12/31/2024  Plan of Care Expiration: 7/12/24   Progress Note Due: 30 days  Visit # / Visits authorized: 9/ 20 + eval (episode 21)  FOTO: 4/3  Precautions: Standard, status post ORIF of right tibia with intramedullary nail with DOS on 4/1/24. You may wean out of the boot as tolerated.       *Do wear it if it is slippery/wet.  You may wean off of the walker as tolerated- but progress to a cane first.  You may need the boot/walker for longer expeditions.     PTA Visit #: 1/5    Time In: 4:32pm  Time Out: 5:35pm  Total Time: 30 minutes    Subjective     Patient reports: has gone without her cane the past 3 days. Patient reports the desensitization has been helping her overall pain during the day as well as the medication she now takes at night.   She was partially compliant with home exercise program.  Response to previous treatment:   Functional change: utilizing cane less frequently    Pain: 2/10  Location: right top of  foot      Objective    4/26/24 R knee AROM 0-115  4/10/24 R knee AROM 0- 105  4/10/24 FOTO completed       Treatment     Harrison received the treatments listed below:      therapeutic exercises to develop strength, endurance, ROM, and flexibility for 11 minutes including:    Bike for ROM, strength, and endurance x 8 minutes R:2  Towel gastroc stretch 5 x15"  " "secs  Forward lunge to stairs for ankle DF 10 x5"  Bridges; x30  Seated heel slides x10 combo toe crunches x5  Ankle ABC; 2 sets  Lateral walking in // bars x5    manual therapy techniques: Myofacial release and Soft tissue Mobilization were applied to the: right lower extremity for 00 minutes, including:  Ankle PROM  Manual gastroc stretch    neuromuscular re-education activities to improve: Balance, Coordination, Kinesthetic, and Proprioception for 11 minutes. The following activities were included:    B stance single heel raises alternating to minimize pain; 2 x10  Ambulation without assistance in // bars x5  Retrowalking in // bars without assistance x5  [place] pickups 2 x1 minute  Toe Waves; 3 x10  Standing Marches; 2 x10    therapeutic activities to improve functional performance for 08 minutes, including:    Step Ups no riser, Upper Extremity assist as needed x30 each  Sit to stands from 18 in box 3 x 10 reps  Push and pull sled with no added #      Gait training for 00 minutes including:    Rocking horse for proper mechanics and equalizing step lengths  Ambulating without upper extremity assistance in // bars gradually increasing marlena as tolerate  Ambulating towards mirror to help with posture, mechanics, and natural     Patient Education and Home Exercises       Education provided:   Written Home Exercises Provided: Patient instructed to cont prior HEP. Exercises were reviewed and Harrison was able to demonstrate them prior to the end of the session.  Harrison demonstrated good  understanding of the education provided. See Electronic Medical Record under Patient Instructions for exercises provided during therapy sessions    Assessment     Patient presents without standard cane today. Continued antalgic gait present while weightbearing through Right Lower Extremity, but patient denies having as much pain intensity. Patient demonstrated greater tolerance to prescribed activities with decreased exacerbations of " pain present with weightbearing activities. Continued endurance deficits present during standing activities requiring regular rest breaks. Plan to continue progression of treatment to further improve quality of life and helping patient reach goals.    Harrison Is progressing well towards her goals.   Patient prognosis is Good.     Patient will continue to benefit from skilled outpatient physical therapy to address the deficits listed in the problem list box on initial evaluation, provide pt/family education and to maximize pt's level of independence in the home and community environment.     Patient's spiritual, cultural and educational needs considered and pt agreeable to plan of care and goals.     Anticipated barriers to physical therapy: attendance     Goals:   STG's 2 weeks  Patient will be independent with 50% of HEP MET 4/10/24     LTG's 10 weeks  Patient will improve FOTO functional measure  score  to 65 %  in order to improve overall QOL & return to PLOF Progressing 44 4/10/24/ 62 5/29/24  2. Patient will report an overall decrease in pain with ADL's and functional mobility Progressing  3. Patient will increase strength by at least 1/2 muscle grade in affected musculature to improve functional mobility MET 5/29/24  4. Patient will improve R knee and ankle ROM to improve functional mobility and ACTIVITIES OF DAILY LIVING's Progressing   5. Patient will be more aware of posture throughout the day to reduce stress  and maintain optimal alignment of the spine Progressing  6. Patient will be independent with HEP Progressing  7. Patient will increase strength by at least 1 muscle grade in affected musculature to improve functional mobility  Plan   Plan of care Certification: 5/29/24-  7/12/24      Outpatient Physical Therapy 2-3  times weekly for 10 weeks to include the following interventions: Electrical Stimulation PRN, Gait Training, Manual Therapy, Moist Heat/ Ice, Neuromuscular Re-ed, Patient Education, Self  Care, Therapeutic Activities, Therapeutic Exercise, and Ultrasound, ASTYM, Kinesiotaping PRN, Functional Dry Needling    Rohan Dominguez, PTA

## 2024-07-03 ENCOUNTER — CLINICAL SUPPORT (OUTPATIENT)
Dept: REHABILITATION | Facility: HOSPITAL | Age: 49
End: 2024-07-03
Payer: COMMERCIAL

## 2024-07-03 DIAGNOSIS — I89.0 LYMPHEDEMA OF RIGHT LOWER EXTREMITY: ICD-10-CM

## 2024-07-03 DIAGNOSIS — R60.9 LIPEDEMA: Primary | ICD-10-CM

## 2024-07-03 PROCEDURE — 97535 SELF CARE MNGMENT TRAINING: CPT | Mod: PN

## 2024-07-03 PROCEDURE — 97016 VASOPNEUMATIC DEVICE THERAPY: CPT | Mod: PN

## 2024-07-03 NOTE — PROGRESS NOTES
[facility]   Physical Therapy Treatment Note/Lymphedema       Name: Harrison Estevez  Clinic Number: [id number]    Therapy Diagnosis:   Encounter Diagnoses   Name Primary?    Lipedema Yes    Lymphedema of right lower extremity       Physician: Brenda Henson DO    Visit Date: 7/3/2024    Physician Orders: PT Eval and Treat  Medical Diagnosis from Referral: Lymphedema  Evaluation Date: 5/13/2024  Authorization Period Expiration: 12/31/2024  Plan of Care Expiration: 7/22/2023  Visit # / Visits authorized: 3    Time In: 4:30 pm  Time Out: 5:40 pm  Total Billable Time: 45 minutes timed 15 minutes     Precautions: Standard    Subjective     Pt reports: loving the velcro wrap for her right leg. Pain is much better in the right leg. She is swelling more in the left leg and will need a velcro wrap for the left leg  She was compliant with home exercise program.  Response to previous treatment: good  Functional change: decrease size of left lower leg, decrease pain of left lower leg    Pain: 3/10  Location: right foot      Objective     Objective Measures updated at progress report unless specified      STAGE II Secondary Lymphedema, due to trauma and hx of lipedema  Amount of Swelling: mild left leg (worsening), mild right lower leg (improving), moderate right knee  Location of Swelling: right lower leg and knee/distal thigh; left lower leg  Skin Integrity: intact, fair skin elasticity right lower leg and knee/distal thigh; fair skin elasticity left lower leg   Palpation/Texture: right leg - soft skin, left leg - soft skin   Circulation: adequate for compression         Girth Measurements (in centimeters)  [place] LEFT LE  5/13/2024 RIGHT LE  5/13/2024 Left lower extremity   7/3/2024 Right lower extremity  7/3/2024   SBP + 20 cm - cm - cm     SBP + 10 cm 67.5 cm 68.5 cm 68 cm 68.5 cm   SBP 62 cm 66.5 cm 64 cm 63 cm   10 cm below SBP 52.5 cm 53 cm 52 cm 53 cm   20 cm below SBP 48 cm 50 cm 46.5  cm 48.5 cm   30 cm below SBP - cm - cm -- --   35 cm below SBP 33 cm 34.5 cm 34 cm 30.5 cm   Ankle 29 cm 30 cm 29 cm 28 cm   Forefoot - cm - cm -- --        Treatment:     Harrison received the following self care and home management X 45 minutes:  -Manual Lymph Drainage training/instruction for bilateral lower extremities (and bilateral upper extremities due to hx of Lipedema). Provided handouts for lower extremity and upper extremity Manual Lymph Drainage.  -measured for marylin compression, high waist pants and a velcro wrap for the left lower extremity and faxed to [employer] [facility]      Harrison received supervised modalities without adverse effects x 15 minutes:  SEQUENTIAL COMPRESSION PUMP: LE- Lympha press with full sleeve for bilateral lower extremities with distal pressures at 40mmHg       Assessment     Harrison has guero compliant with compression and exercise since the last visit. She now has a decrease in pain in the right leg since wearing compression.  Harrison Is progressing well towards her goals. She has tried elevation, exercise, compression garments/bandaging for since 5/13/2024 and swelling still persists. Home pump is recommended for bilateral lower extremities.      Goals:  Short Term Goals: (5 weeks)  1. Patient will show decreased girth in R LE by up to 1-2 cm to allow for LE symmetry, shoe and clothing choice, and ability to apply needed compression.  (GOAL MET)   2. Patient will demonstrate 100% knowledge of lymphedema precautions and signs of infection to allow for reduced lymphedema risk, infection risk, and/or exacerbation of condition.  (GOAL MET)  3. Patient or caregiver will perform self-bandaging techniques and/or wearing of compression garments to allow for lymphatic drainage support, skin elasticity, and reduction in shape and size of limb. (GOAL MET)  4. Patient will perform self lymph drainage techniques to areas within reach to enhance lymphatic drainage and skin condition.  (progressing, not  met)  5. Patient will tolerate daily activities with multilayered bandaging to allow for lymphatic and venous support.  (GOAL MET)     Long Term Goals: (10  weeks)  1. Patient will show decreased girth in R LE by up to 2-4 cm  to allow for LE symmetry, shoe and clothing choice, and ability to apply needed compression daily.  (progressing, not met)  2. Patient will show reduction in density to mild or less with improved contour of limb to allow for cosmesis, LE symmetry, infection risk reduction, and clothing and compression choice.   (progressing, not met)  3. Patient to arun/doff compression garment with daily compliance to assist in lymphedema management, skin elasticity, and tissue density.  (progressing, not met)  4. Pt to show improved postural awareness and alignment.  (progressing, not met)  5. Pt to be I and compliant with HEP to allow for increased function in affected limb.   (progressing, not met)        Plan   Plan of care Certification: 5/13/2024 to 7/22/2024.     Outpatient Physical Therapy 1 times weekly for 10 weeks to include the following interventions: Manual Therapy, Patient Education, Self Care, and vaso-pneumatic compression. Complete Decongestive Therapy- compression and home equipment needs to be addressed and assisted.      Carol Samayoa, PT

## 2024-07-08 ENCOUNTER — CLINICAL SUPPORT (OUTPATIENT)
Dept: REHABILITATION | Facility: HOSPITAL | Age: 49
End: 2024-07-08
Payer: COMMERCIAL

## 2024-07-08 DIAGNOSIS — R26.9 GAIT DIFFICULTY: ICD-10-CM

## 2024-07-08 DIAGNOSIS — R52 PAIN: Primary | ICD-10-CM

## 2024-07-08 DIAGNOSIS — R53.1 WEAKNESS: ICD-10-CM

## 2024-07-08 PROCEDURE — 97112 NEUROMUSCULAR REEDUCATION: CPT | Mod: PN,CQ

## 2024-07-08 PROCEDURE — 97530 THERAPEUTIC ACTIVITIES: CPT | Mod: PN,CQ

## 2024-07-08 PROCEDURE — 97110 THERAPEUTIC EXERCISES: CPT | Mod: PN,CQ

## 2024-07-08 NOTE — PROGRESS NOTES
[facility]   Physical Therapy Treatment Note      Name: Harrison Estevez  [facility] Number: [id number]    Therapy Diagnosis:   Encounter Diagnoses   Name Primary?    Pain Yes    Weakness     Gait difficulty          Physician: Brenda Henson DO    Visit Date: 7/8/2024    Physician Orders: PT Eval and Treat   Medical Diagnosis from Referral:  Closed displaced segmental fracture of shaft of right tibia with routine healing, subsequent encounter   Closed fracture of shaft of right fibula with routine healing, unspecified fracture morphology, subsequent encounter  Evaluation Date: 3/13/2024  Authorization Period Expiration: 12/31/2024  Plan of Care Expiration: 7/12/24   Progress Note Due: 30 days  Visit # / Visits authorized: 12/ 20 + eval (episode 26)  FOTO: 4/3  Precautions: Standard, status post ORIF of right tibia with intramedullary nail with DOS on 4/1/24. You may wean out of the boot as tolerated.       *Do wear it if it is slippery/wet.  You may wean off of the walker as tolerated- but progress to a cane first.  You may need the boot/walker for longer expeditions.     PTA Visit #: 3/5    Time In: 5:37pm  Time Out: 6:32pm  Total Time: 30 minutes    Subjective     Patient reports: gradually getting more comfortable ambulating without the cane. Patient reports upcoming vacation for the first time in years and will be out of therapy for over a week.  She was partially compliant with home exercise program.  Response to previous treatment: soreness   Functional change: easier getting around with decrease pain intensity    Pain: 2/10  Location: right top of  foot      Objective    4/26/24 R knee AROM 0-115  4/10/24 R knee AROM 0- 105  4/10/24 FOTO completed       Treatment     Harrison received the treatments listed below:      therapeutic exercises to develop strength, endurance, ROM, and flexibility for 10 minutes including:    Bike for ROM, strength, and endurance x 8 minutes R:2  Towel  "gastroc stretch 5 x15"  secs  Forward lunge to stairs for ankle DF 10 x5"  Seated heel slides x10 combo toe crunches x5  Ankle ABC; 2 sets  Lateral walking in // bars x5    manual therapy techniques: Myofacial release and Soft tissue Mobilization were applied to the: right lower extremity for 00 minutes, including:  Ankle PROM  Manual gastroc stretch    neuromuscular re-education activities to improve: Balance, Coordination, Kinesthetic, and Proprioception for 11 minutes. The following activities were included:    B stance single heel raises alternating to minimize pain; 2 x10  Ambulation without assistance in // bars x5  Retrowalking in // bars without assistance x5  [place] pickups 2 x1 minute  Toe Waves; 3 x10  Standing Marches; 2 x10    therapeutic activities to improve functional performance for 09 minutes, including:    Step Ups no riser, Upper Extremity assist as needed x30 each  Sit to stands from 18 in box 3 x 10 reps  Push and pull sled with no added #      Gait training for 00 minutes including:    Rocking horse for proper mechanics and equalizing step lengths  Ambulating without upper extremity assistance in // bars gradually increasing marlena as tolerate  Ambulating towards mirror to help with posture, mechanics, and natural     Patient Education and Home Exercises       Education provided:   Written Home Exercises Provided: Patient instructed to cont prior HEP. Exercises were reviewed and Harrison was able to demonstrate them prior to the end of the session.  Harrison demonstrated good  understanding of the education provided. See Electronic Medical Record under Patient Instructions for exercises provided during therapy sessions    Assessment     Continued focus of treatment attempting to improve ankle mobility and weightbearing tolerance. Patient continues to demonstrate greatest limitations of pain when attempting standing heel raises. Patient demonstrates improvements in gait mechanics at initiation of " treatment, but had an increase of abnormal gait following treatment. She is continuing to demonstrate decreased frequency of rest breaks needed with standing activities. Plan to continue improving patient's functional strength and independence.     Harrison Is progressing well towards her goals.   Patient prognosis is Good.     Patient will continue to benefit from skilled outpatient physical therapy to address the deficits listed in the problem list box on initial evaluation, provide pt/family education and to maximize pt's level of independence in the home and community environment.     Patient's spiritual, cultural and educational needs considered and pt agreeable to plan of care and goals.     Anticipated barriers to physical therapy: attendance     Goals:   STG's 2 weeks  Patient will be independent with 50% of HEP MET 4/10/24     LTG's 10 weeks  Patient will improve FOTO functional measure  score  to 65 %  in order to improve overall QOL & return to PLOF Progressing 44 4/10/24/ 62 5/29/24  2. Patient will report an overall decrease in pain with ADL's and functional mobility Progressing  3. Patient will increase strength by at least 1/2 muscle grade in affected musculature to improve functional mobility MET 5/29/24  4. Patient will improve R knee and ankle ROM to improve functional mobility and ACTIVITIES OF DAILY LIVING's Progressing   5. Patient will be more aware of posture throughout the day to reduce stress  and maintain optimal alignment of the spine Progressing  6. Patient will be independent with HEP Progressing  7. Patient will increase strength by at least 1 muscle grade in affected musculature to improve functional mobility    Plan   Plan of care Certification: 5/29/24-  7/12/24      Outpatient Physical Therapy 2-3  times weekly for 10 weeks to include the following interventions: Electrical Stimulation PRN, Gait Training, Manual Therapy, Moist Heat/ Ice, Neuromuscular Re-ed, Patient Education, Self  Care, Therapeutic Activities, Therapeutic Exercise, and Ultrasound, ASTYM, Kinesiotaping PRN, Functional Dry Needling    Rohan Dominguez, PTA

## 2024-07-22 ENCOUNTER — CLINICAL SUPPORT (OUTPATIENT)
Dept: REHABILITATION | Facility: HOSPITAL | Age: 49
End: 2024-07-22
Payer: COMMERCIAL

## 2024-07-22 DIAGNOSIS — R26.9 GAIT DIFFICULTY: ICD-10-CM

## 2024-07-22 DIAGNOSIS — R52 PAIN: Primary | ICD-10-CM

## 2024-07-22 DIAGNOSIS — R53.1 WEAKNESS: ICD-10-CM

## 2024-07-22 PROCEDURE — 97112 NEUROMUSCULAR REEDUCATION: CPT | Mod: PN

## 2024-07-22 PROCEDURE — 97110 THERAPEUTIC EXERCISES: CPT | Mod: PN

## 2024-07-22 PROCEDURE — 97530 THERAPEUTIC ACTIVITIES: CPT | Mod: PN

## 2024-07-22 NOTE — PROGRESS NOTES
[facility]   Physical Therapy Treatment Note /updated POC     Name: Harrison Estevez  Clinic Number: [id number]    Therapy Diagnosis:   Encounter Diagnoses   Name Primary?    Pain Yes    Weakness     Gait difficulty          Physician: Brenda Henson DO    Visit Date: 7/22/2024    Physician Orders: PT Eval and Treat   Medical Diagnosis from Referral:  Closed displaced segmental fracture of shaft of right tibia with routine healing, subsequent encounter   Closed fracture of shaft of right fibula with routine healing, unspecified fracture morphology, subsequent encounter  Evaluation Date: 3/13/2024  Authorization Period Expiration: 12/31/2024  Plan of Care Expiration: 8/30/24  Progress Note Due: 30 days  Visit # / Visits authorized: 13/ 20 + eval (episode 27)  FOTO: 5/5  Precautions: Standard, status post ORIF of right tibia with intramedullary nail with DOS on 4/1/24. You may wean out of the boot as tolerated.       *Do wear it if it is slippery/wet.  You may wean off of the walker as tolerated- but progress to a cane first.  You may need the boot/walker for longer expeditions.     PTA Visit #: 0/5    Time In: 3:45  Time Out: 4:55  Total Time: 69  minutes    Subjective     Patient reports: she is doing better overall since starting therapy and walking better. F/U with Dr. Henson 8/6/24. Patient has been out of town on vacation.   She was partially compliant with home exercise program.  Response to previous treatment: soreness   Functional change: easier getting around with decrease pain intensity    Pain: 5/10  Location: right top of  foot      Objective    7/22/24 R knee AROM 0-  115 degrees, AROM R DF -10 from neutral, 50 PF, inversion/ eversion 20 degrees/ eversion painful, MMT DF 3+/5, inversion 3/5, eversion 3+/5, R knee extension 4/5, knee flexion 4/5    7/22/24 FOTO completed       Treatment     Harrison received the treatments listed below:      therapeutic exercises to develop  "strength, endurance, ROM, and flexibility for 23 minutes including:  ROM  MMT  Bike for ROM, strength, and endurance x 6 minutes R:4  Wobble board standing f/b 3 minutes    deferred  Towel gastroc stretch 5 x15"  secs  Forward lunge to stairs for ankle DF 10 x5"  Seated heel slides x10 combo toe crunches x5  Ankle ABC; 2 sets  Lateral walking in // bars x5    manual therapy techniques: Myofacial release and Soft tissue Mobilization were applied to the: right lower extremity for 00 minutes, including:  Ankle PROM  Manual gastroc stretch    neuromuscular re-education activities to improve: Balance, Coordination, Kinesthetic, and Proprioception for 23 minutes. The following activities were included:  Standing arch strength exercise with ball 3 x 10 reps  Standing toe and heel raises 3 x 10 reps  Shuttle 4 bands 1 minute x 2     deferred  Ambulation without assistance in // bars x5  Retrowalking in // bars without assistance x5  [place] pickups 2 x1 minute  Toe Waves; 3 x10  Standing Marches; 2 x10    therapeutic activities to improve functional performance for 29 minutes, including:  FOTO completed  Transfers on and off of equipment  Swelling management  Step Ups no riser, Upper Extremity assist as needed x30 each not performed today  Sit to stands from 18 in box 2 x 10 reps with 5# KB  Push and pull sled with no added # 1/2 lap each and pain on top of R foot with pulling      Gait training for 00 minutes including:    Rocking horse for proper mechanics and equalizing step lengths  Ambulating without upper extremity assistance in // bars gradually increasing marlena as tolerate  Ambulating towards mirror to help with posture, mechanics, and natural     Patient Education and Home Exercises       Education provided:   Written Home Exercises Provided: Patient instructed to cont prior HEP. Exercises were reviewed and Harrison was able to demonstrate them prior to the end of the session.  Harrison demonstrated good  " understanding of the education provided. See Electronic Medical Record under Patient Instructions for exercises provided during therapy sessions    Assessment   Patient was last seen on 7/8/24. She is about 6 months post op.  Patient presents to therapy walking with no AD with a limp. Pain on top of R foot continues as well as swelling in R LE. She is slowly progressing with ROM and strength. R knee AROM 0-  115 degrees, AROM R DF -10 from neutral, 50 PF, inversion/ eversion 20 degrees/ eversion painful, MMT DF 3+/5, inversion 3/5, eversion 3+/5, R knee extension 4/5, knee flexion 4/5. FOTO score is improving. Patient would benefit from continued skilled PT intervention for increase ROM and strength to improve quality of life.   .     Harrison Is progressing well towards her goals.   Patient prognosis is Good.     Patient will continue to benefit from skilled outpatient physical therapy to address the deficits listed in the problem list box on initial evaluation, provide pt/family education and to maximize pt's level of independence in the home and community environment.     Patient's spiritual, cultural and educational needs considered and pt agreeable to plan of care and goals.     Anticipated barriers to physical therapy: attendance     Goals:   STG's 2 weeks  Patient will be independent with 50% of HEP MET 4/10/24     LTG's 10 weeks  Patient will improve FOTO functional measure  score  to 65 %  in order to improve overall QOL & return to PLOF Progressing 44 4/10/24/ 62 5/29/24/ MET 67 7/22/24  2. Patient will report an overall decrease in pain with ADL's and functional mobility Progressing/ pain ranges from 0-5/10 7/22/24  3. Patient will increase strength by at least 1/2 muscle grade in affected musculature to improve functional mobility MET 5/29/24  4. Patient will improve R knee and ankle ROM to improve functional mobility and ACTIVITIES OF DAILY LIVING's MET 7/22/24  5. Patient will be more aware of posture  throughout the day to reduce stress  and maintain optimal alignment of the spine MET 7/22/24  6. Patient will be independent with HEP Progressing  7. Patient will increase strength by at least 1 muscle grade in affected musculature to improve functional mobility Progressing with ankle/ knee met 7/22/24    Plan   Plan of care Certification: 7/22/24- 8/30/24     Outpatient Physical Therapy 2-3  times weekly for 10 weeks to include the following interventions: Electrical Stimulation PRN, Gait Training, Manual Therapy, Moist Heat/ Ice, Neuromuscular Re-ed, Patient Education, Self Care, Therapeutic Activities, Therapeutic Exercise, and Ultrasound, ASTYM, Kinesiotaping PRN, Functional Dry Needling    Rosa Hernandez, PT

## 2024-07-24 ENCOUNTER — CLINICAL SUPPORT (OUTPATIENT)
Dept: REHABILITATION | Facility: HOSPITAL | Age: 49
End: 2024-07-24
Payer: COMMERCIAL

## 2024-07-24 DIAGNOSIS — R52 PAIN: Primary | ICD-10-CM

## 2024-07-24 DIAGNOSIS — R53.1 WEAKNESS: ICD-10-CM

## 2024-07-24 DIAGNOSIS — R26.9 GAIT DIFFICULTY: ICD-10-CM

## 2024-07-24 PROCEDURE — 97110 THERAPEUTIC EXERCISES: CPT | Mod: PN

## 2024-07-24 PROCEDURE — 97112 NEUROMUSCULAR REEDUCATION: CPT | Mod: PN

## 2024-07-24 PROCEDURE — 97530 THERAPEUTIC ACTIVITIES: CPT | Mod: PN

## 2024-07-24 NOTE — PROGRESS NOTES
[facility]   Physical Therapy Treatment Note      Name: Harrison Estevez  [facility] Number: [id number]    Therapy Diagnosis:   Encounter Diagnoses   Name Primary?    Pain Yes    Weakness     Gait difficulty            Physician: Brenda Henson DO    Visit Date: 7/24/2024    Physician Orders: PT Eval and Treat   Medical Diagnosis from Referral:  Closed displaced segmental fracture of shaft of right tibia with routine healing, subsequent encounter   Closed fracture of shaft of right fibula with routine healing, unspecified fracture morphology, subsequent encounter  Evaluation Date: 3/13/2024  Authorization Period Expiration: 12/31/2024  Plan of Care Expiration: 8/30/24  Progress Note Due: 30 days  Visit # / Visits authorized: 14/ 20 + eval (episode 27)  FOTO: 5/5  Precautions: Standard, status post ORIF of right tibia with intramedullary nail with DOS on 4/1/24. You may wean out of the boot as tolerated.       *Do wear it if it is slippery/wet.  You may wean off of the walker as tolerated- but progress to a cane first.  You may need the boot/walker for longer expeditions.     PTA Visit #: 0/5    Time In: 3:51  Time Out: 5:00  Total Time: 69  minutes    Subjective     Patient reports: no new complaints. Only pain on top of R foot.  F/U with Dr. Henson 8/6/24.     She was partially compliant with home exercise program.  Response to previous treatment: soreness   Functional change: easier getting around with decrease pain intensity    Pain: 2/10  Location: right top of  foot      Objective    7/22/24 R knee AROM 0-  115 degrees, AROM R DF -10 from neutral, 50 PF, inversion/ eversion 20 degrees/ eversion painful, MMT DF 3+/5, inversion 3/5, eversion 3+/5, R knee extension 4/5, knee flexion 4/5    7/22/24 FOTO completed       Treatment     Harrison received the treatments listed below:      therapeutic exercises to develop strength, endurance, ROM, and flexibility for 11 minutes including:  Bike  "for ROM, strength, and endurance x 8 minutes R:4  Wobble board standing f/b 3 minutes    deferred  Towel gastroc stretch 5 x15"  secs  Forward lunge to stairs for ankle DF 10 x5"  Seated heel slides x10 combo toe crunches x5  Ankle ABC; 2 sets  Lateral walking in // bars x5    manual therapy techniques: Myofacial release and Soft tissue Mobilization were applied to the: right lower extremity for 00 minutes, including:  Ankle PROM  Manual gastroc stretch    neuromuscular re-education activities to improve: Balance, Coordination, Kinesthetic, and Proprioception for 15 minutes. The following activities were included:  Standing arch strength exercise with ball 3 x 10 reps  Standing toe and heel raises 3 x 10 reps  Shuttle 6 bands 1 minute x 2     deferred  Ambulation without assistance in // bars x5  Retrowalking in // bars without assistance x5  [place] pickups 2 x1 minute  Toe Waves; 3 x10  Standing Marches; 2 x10    therapeutic activities to improve functional performance for 43 minutes, including:  Blaze pods color catch 3 cycles 30 sec duration / WB/ agility increased hit by 1 each 7,8,9, knee clocks with blaze pods 17 hits/ 17 hits, 17 hits  Transfers on and off of equipment  Swelling management  Step Ups no riser, Upper Extremity assist as needed x30 each not performed today  Sit to stands from 18 in box 3 x 10 reps with 7. 5# KB  Push and pull sled with no added # 1/2 lap each and pain on top of R foot with pulling  HEP discussed  Rebounder with yellow ball overhead with alternating step pattern     Gait training for 00 minutes including:    Rocking horse for proper mechanics and equalizing step lengths  Ambulating without upper extremity assistance in // bars gradually increasing marlena as tolerate  Ambulating towards mirror to help with posture, mechanics, and natural     Patient Education and Home Exercises       Education provided:   Written Home Exercises Provided: Patient instructed to cont prior HEP. " Exercises were reviewed and Harrison was able to demonstrate them prior to the end of the session.  Harrison demonstrated good  understanding of the education provided. See Electronic Medical Record under Patient Instructions for exercises provided during therapy sessions    Assessment   Patient progressed with resistance and dynamic functional activities using blaze pods today. She was able to perform quick transitional movements with no LOB with CGA and balance activities on R LE. Patient was tired after the session. Patient wants to be able walk with a normal gait pattern with no limp. PT will progress patient as tolerated.        Harrison Is progressing well towards her goals.   Patient prognosis is Good.     Patient will continue to benefit from skilled outpatient physical therapy to address the deficits listed in the problem list box on initial evaluation, provide pt/family education and to maximize pt's level of independence in the home and community environment.     Patient's spiritual, cultural and educational needs considered and pt agreeable to plan of care and goals.     Anticipated barriers to physical therapy: attendance     Goals:   STG's 2 weeks  Patient will be independent with 50% of HEP MET 4/10/24     LTG's 10 weeks  Patient will improve FOTO functional measure  score  to 65 %  in order to improve overall QOL & return to PLOF Progressing 44 4/10/24/ 62 5/29/24/ MET 67 7/22/24  2. Patient will report an overall decrease in pain with ADL's and functional mobility Progressing/ pain ranges from 0-5/10 7/22/24  3. Patient will increase strength by at least 1/2 muscle grade in affected musculature to improve functional mobility MET 5/29/24  4. Patient will improve R knee and ankle ROM to improve functional mobility and ACTIVITIES OF DAILY LIVING's MET 7/22/24  5. Patient will be more aware of posture throughout the day to reduce stress  and maintain optimal alignment of the spine MET 7/22/24  6. Patient will  be independent with HEP Progressing  7. Patient will increase strength by at least 1 muscle grade in affected musculature to improve functional mobility Progressing with ankle/ knee met 7/22/24    Plan   Plan of care Certification: 7/22/24- 8/30/24     Outpatient Physical Therapy 2-3  times weekly for 10 weeks to include the following interventions: Electrical Stimulation PRN, Gait Training, Manual Therapy, Moist Heat/ Ice, Neuromuscular Re-ed, Patient Education, Self Care, Therapeutic Activities, Therapeutic Exercise, and Ultrasound, ASTYM, Kinesiotaping PRN, Functional Dry Needling    Rosa Hernandez, PT

## 2024-07-30 ENCOUNTER — CLINICAL SUPPORT (OUTPATIENT)
Dept: REHABILITATION | Facility: HOSPITAL | Age: 49
End: 2024-07-30
Payer: COMMERCIAL

## 2024-07-30 DIAGNOSIS — R26.9 GAIT DIFFICULTY: ICD-10-CM

## 2024-07-30 DIAGNOSIS — R52 PAIN: Primary | ICD-10-CM

## 2024-07-30 DIAGNOSIS — R53.1 WEAKNESS: ICD-10-CM

## 2024-07-30 PROCEDURE — 97530 THERAPEUTIC ACTIVITIES: CPT | Mod: PN,CQ

## 2024-07-30 PROCEDURE — 97110 THERAPEUTIC EXERCISES: CPT | Mod: PN,CQ

## 2024-07-30 PROCEDURE — 97112 NEUROMUSCULAR REEDUCATION: CPT | Mod: PN,CQ

## 2024-07-30 NOTE — PROGRESS NOTES
[facility]   Physical Therapy Treatment Note      Name: Harrison Estevez  [facility] Number: [id number]    Therapy Diagnosis:   Encounter Diagnoses   Name Primary?    Pain Yes    Weakness     Gait difficulty            Physician: Brenda Henson DO    Visit Date: 7/30/2024    Physician Orders: PT Eval and Treat   Medical Diagnosis from Referral:  Closed displaced segmental fracture of shaft of right tibia with routine healing, subsequent encounter   Closed fracture of shaft of right fibula with routine healing, unspecified fracture morphology, subsequent encounter  Evaluation Date: 3/13/2024  Authorization Period Expiration: 12/31/2024  Plan of Care Expiration: 8/30/24  Progress Note Due: 30 days  Visit # / Visits authorized: 15/ 20 + eval (episode 29)  FOTO: 5/5  Precautions: Standard, status post ORIF of right tibia with intramedullary nail with DOS on 4/1/24. You may wean out of the boot as tolerated.       *Do wear it if it is slippery/wet.  You may wean off of the walker as tolerated- but progress to a cane first.  You may need the boot/walker for longer expeditions.     PTA Visit #: 1/5    Time In: 3:34pm  Time Out: 5:33pm  Total Time: 55  minutes    Subjective     Patient reports: almost fell at work today when first trying to walk after being seated for a long while. Patient reports continuing to have pain on the anteromedial ankle/foot.  She was partially compliant with home exercise program.  Response to previous treatment: soreness   Functional change: easier getting around with decrease pain intensity    Pain: 2/10  Location: right top of  foot      Objective    7/22/24 R knee AROM 0-  115 degrees, AROM R DF -10 from neutral, 50 PF, inversion/ eversion 20 degrees/ eversion painful, MMT DF 3+/5, inversion 3/5, eversion 3+/5, R knee extension 4/5, knee flexion 4/5    7/22/24 FOTO completed       Treatment     Harrison received the treatments listed below:      therapeutic exercises  "to develop strength, endurance, ROM, and flexibility for 12 minutes including:  Bike for ROM, strength, and endurance x 8 minutes R:4  Towel gastroc stretch 5 x15"  secs  Forward lunge to stairs for ankle DF 10 x5"  Seated heel slides x10 combo toe crunches x5  Lateral walking in // bars x5  BAPs; x15, 4 way    manual therapy techniques: Myofacial release and Soft tissue Mobilization were applied to the: right lower extremity for 00 minutes, including:  Ankle PROM  Manual gastroc stretch    neuromuscular re-education activities to improve: Balance, Coordination, Kinesthetic, and Proprioception for 16 minutes. The following activities were included:  Standing arch strength exercise with ball 3 x 10 reps  Standing toe and heel raises 3 x 10 reps  [place] pickups 2 x1 minute  Toe Waves; 3 x10  Standing Marches; 2 x10    therapeutic activities to improve functional performance for 27 minutes, including:    Blaze pods   2 x30" (each) SLS with 4 pod tapping on opposite foot  Alternating feet tapping 1 of 3 colors displayed  Step Ups no riser, Upper Extremity assist as needed x30 each not performed today  Sit to stands from 18 in box 3 x 10 reps with 7. 5# KB  Push and pull sled with no added # 1/2 lap each and pain on top of R foot with pulling  Rebounder with yellow ball overhead with alternating step pattern       Patient Education and Home Exercises       Education provided:   Written Home Exercises Provided: Patient instructed to cont prior HEP. Exercises were reviewed and Harrison was able to demonstrate them prior to the end of the session.  Harveyda demonstrated good  understanding of the education provided. See Electronic Medical Record under Patient Instructions for exercises provided during therapy sessions    Assessment     Patient continues to demonstrate antalgic gait while weightbearing through Right Lower Extremity. Continued attempting to improve weightbearing tolerance, stability, and ankle/lower extremity " strength as patient demonstrated tolerance to. When patient is performing an interactive task such as blaze pods at which her attention is focused, patient is able to complete prescribed duration/sets without exacerbations of ankle/foot pain. When patient is completing a less complex task primarily when ankle/foot musculature is involved such as heel raises, BAPs, or toe yoga, patient continues to demonstrate reoccurrence of pain.     Harrison Is progressing well towards her goals.   Patient prognosis is Good.     Patient will continue to benefit from skilled outpatient physical therapy to address the deficits listed in the problem list box on initial evaluation, provide pt/family education and to maximize pt's level of independence in the home and community environment.     Patient's spiritual, cultural and educational needs considered and pt agreeable to plan of care and goals.     Anticipated barriers to physical therapy: attendance     Goals:   STG's 2 weeks  Patient will be independent with 50% of HEP MET 4/10/24     LTG's 10 weeks  Patient will improve FOTO functional measure  score  to 65 %  in order to improve overall QOL & return to PLOF Progressing 44 4/10/24/ 62 5/29/24/ MET 67 7/22/24  2. Patient will report an overall decrease in pain with ADL's and functional mobility Progressing/ pain ranges from 0-5/10 7/22/24  3. Patient will increase strength by at least 1/2 muscle grade in affected musculature to improve functional mobility MET 5/29/24  4. Patient will improve R knee and ankle ROM to improve functional mobility and ACTIVITIES OF DAILY LIVING's MET 7/22/24  5. Patient will be more aware of posture throughout the day to reduce stress  and maintain optimal alignment of the spine MET 7/22/24  6. Patient will be independent with HEP Progressing  7. Patient will increase strength by at least 1 muscle grade in affected musculature to improve functional mobility Progressing with ankle/ knee met  7/22/24    Plan   Plan of care Certification: 7/22/24- 8/30/24     Outpatient Physical Therapy 2-3  times weekly for 10 weeks to include the following interventions: Electrical Stimulation PRN, Gait Training, Manual Therapy, Moist Heat/ Ice, Neuromuscular Re-ed, Patient Education, Self Care, Therapeutic Activities, Therapeutic Exercise, and Ultrasound, ASTYM, Kinesiotaping PRN, Functional Dry Needling    Rohan Dominguez, PTA

## 2024-08-01 ENCOUNTER — CLINICAL SUPPORT (OUTPATIENT)
Dept: REHABILITATION | Facility: HOSPITAL | Age: 49
End: 2024-08-01
Payer: COMMERCIAL

## 2024-08-01 DIAGNOSIS — R53.1 WEAKNESS: ICD-10-CM

## 2024-08-01 DIAGNOSIS — R52 PAIN: Primary | ICD-10-CM

## 2024-08-01 DIAGNOSIS — R26.9 GAIT DIFFICULTY: ICD-10-CM

## 2024-08-01 PROCEDURE — 97530 THERAPEUTIC ACTIVITIES: CPT | Mod: PN,CQ

## 2024-08-01 PROCEDURE — 97112 NEUROMUSCULAR REEDUCATION: CPT | Mod: PN,CQ

## 2024-08-01 PROCEDURE — 97110 THERAPEUTIC EXERCISES: CPT | Mod: PN,CQ

## 2024-08-01 NOTE — PROGRESS NOTES
[facility]   Physical Therapy Treatment Note      Name: Harrison FU [facility] Number: [id number]    Therapy Diagnosis:   Encounter Diagnoses   Name Primary?    Pain Yes    Weakness     Gait difficulty        Physician: Brenda Henson DO    Visit Date: 8/1/2024    Physician Orders: PT Eval and Treat   Medical Diagnosis from Referral:  Closed displaced segmental fracture of shaft of right tibia with routine healing, subsequent encounter   Closed fracture of shaft of right fibula with routine healing, unspecified fracture morphology, subsequent encounter  Evaluation Date: 3/13/2024  Authorization Period Expiration: 12/31/2024  Plan of Care Expiration: 8/30/24  Progress Note Due: 30 days  Visit # / Visits authorized: 16/ 20 + eval (episode 29)  FOTO: 5/5  Precautions: Standard, status post ORIF of right tibia with intramedullary nail with DOS on 4/1/24. You may wean out of the boot as tolerated.       *Do wear it if it is slippery/wet.  You may wean off of the walker as tolerated- but progress to a cane first.  You may need the boot/walker for longer expeditions.     PTA Visit #: 2/5    Time In: 4:18pm  Time Out: 5:23pm  Total Time: 54  minutes    Subjective     Patient reports: continuing to have right anteromedial foot pain but denies exacerbations. Patient reports continuing to take care of her mom regularly and rarely does much for her own health.   She was partially compliant with home exercise program.  Response to previous treatment: soreness   Functional change: n/a    Pain: 2/10  Location: right top of  foot      Objective    7/22/24 R knee AROM 0-  115 degrees, AROM R DF -10 from neutral, 50 PF, inversion/ eversion 20 degrees/ eversion painful, MMT DF 3+/5, inversion 3/5, eversion 3+/5, R knee extension 4/5, knee flexion 4/5    7/22/24 FOTO completed       Treatment     Harrison received the treatments listed below:      therapeutic exercises to develop strength, endurance, ROM,  "and flexibility for 17 minutes including:    Bike for ROM, strength, and endurance x 4 minutes R:3  Elliptical; 5 minutes  Towel gastroc stretch 5 x25"  secs  Forward lunge to stairs for ankle DF 10 x5"  Lateral walking in // bars Green Theraband x4  Ankle DF, INV, EV; Red Theraband 2 x10 each    manual therapy techniques: Myofacial release and Soft tissue Mobilization were applied to the: right lower extremity for 00 minutes, including:  Ankle PROM  Manual gastroc stretch    neuromuscular re-education activities to improve: Balance, Coordination, Kinesthetic, and Proprioception for 12 minutes. The following activities were included:    Standing toe and heel raises 3 x 10 reps  Standing Marches on airex; 3 x10  Step ups; airex x20 each    therapeutic activities to improve functional performance for 25 minutes, including:    Blaze pods   2 x30" (each) SLS with 4 pod tapping on opposite foot  Alternating feet tapping 1 of 4 colors displayed; 2 x 30"  Sit to stands from 18 in box 3 x 10 reps with 10# KB  Push and pull sled; 1 full lap each   [place] carries; 10# each 2 laps    Patient Education and Home Exercises       Education provided:   Written Home Exercises Provided: Patient instructed to cont prior HEP. Exercises were reviewed and Harrison was able to demonstrate them prior to the end of the session.  Harveyda demonstrated good  understanding of the education provided. See Electronic Medical Record under Patient Instructions for exercises provided during therapy sessions    Assessment     Patient continued to demonstrate reoccurrence of anteromedial ankle foot pain when attention is focused on area. As an example when patient completes ankle AROM against resistance only activating right ankle, she demonstrated pain, but when also activating left ankle at the same time, she did not demonstrate pain. Patient completed all prescribed activities without limitations of pain being present. Progressed cardiovascular " activity with inclusion of elliptical today in attempt for patient to better assess her own capabilities for activity outside of therapy and help patient reach her goals. Plan to continue progression of treatment further into function for a greater quality of life.     Harrison Is progressing well towards her goals.   Patient prognosis is Good.     Patient will continue to benefit from skilled outpatient physical therapy to address the deficits listed in the problem list box on initial evaluation, provide pt/family education and to maximize pt's level of independence in the home and community environment.     Patient's spiritual, cultural and educational needs considered and pt agreeable to plan of care and goals.     Anticipated barriers to physical therapy: attendance     Goals:   STG's 2 weeks  Patient will be independent with 50% of HEP MET 4/10/24     LTG's 10 weeks  Patient will improve FOTO functional measure  score  to 65 %  in order to improve overall QOL & return to PLOF Progressing 44 4/10/24/ 62 5/29/24/ MET 67 7/22/24  2. Patient will report an overall decrease in pain with ADL's and functional mobility Progressing/ pain ranges from 0-5/10 7/22/24  3. Patient will increase strength by at least 1/2 muscle grade in affected musculature to improve functional mobility MET 5/29/24  4. Patient will improve R knee and ankle ROM to improve functional mobility and ACTIVITIES OF DAILY LIVING's MET 7/22/24  5. Patient will be more aware of posture throughout the day to reduce stress  and maintain optimal alignment of the spine MET 7/22/24  6. Patient will be independent with HEP Progressing  7. Patient will increase strength by at least 1 muscle grade in affected musculature to improve functional mobility Progressing with ankle/ knee met 7/22/24    Plan   Plan of care Certification: 7/22/24- 8/30/24     Outpatient Physical Therapy 2-3  times weekly for 10 weeks to include the following interventions: Electrical  Stimulation PRN, Gait Training, Manual Therapy, Moist Heat/ Ice, Neuromuscular Re-ed, Patient Education, Self Care, Therapeutic Activities, Therapeutic Exercise, and Ultrasound, ASTYM, Kinesiotaping PRN, Functional Dry Needling    Rohan Dominguez, PTA

## 2024-08-06 DIAGNOSIS — M79.661 PAIN OF RIGHT LOWER LEG: Primary | ICD-10-CM

## 2024-08-07 ENCOUNTER — CLINICAL SUPPORT (OUTPATIENT)
Dept: REHABILITATION | Facility: HOSPITAL | Age: 49
End: 2024-08-07
Payer: COMMERCIAL

## 2024-08-07 DIAGNOSIS — R53.1 WEAKNESS: ICD-10-CM

## 2024-08-07 DIAGNOSIS — R26.9 GAIT DIFFICULTY: ICD-10-CM

## 2024-08-07 DIAGNOSIS — R52 PAIN: Primary | ICD-10-CM

## 2024-08-07 PROCEDURE — 97530 THERAPEUTIC ACTIVITIES: CPT | Mod: PN,CQ

## 2024-08-07 PROCEDURE — 97112 NEUROMUSCULAR REEDUCATION: CPT | Mod: PN,CQ

## 2024-08-07 PROCEDURE — 97110 THERAPEUTIC EXERCISES: CPT | Mod: PN,CQ

## 2024-08-09 ENCOUNTER — CLINICAL SUPPORT (OUTPATIENT)
Dept: REHABILITATION | Facility: HOSPITAL | Age: 49
End: 2024-08-09
Payer: COMMERCIAL

## 2024-08-09 DIAGNOSIS — R52 PAIN: Primary | ICD-10-CM

## 2024-08-09 DIAGNOSIS — R53.1 WEAKNESS: ICD-10-CM

## 2024-08-09 DIAGNOSIS — R26.9 GAIT DIFFICULTY: ICD-10-CM

## 2024-08-09 PROCEDURE — 97112 NEUROMUSCULAR REEDUCATION: CPT | Mod: PN

## 2024-08-09 PROCEDURE — 97110 THERAPEUTIC EXERCISES: CPT | Mod: PN

## 2024-08-09 PROCEDURE — 97530 THERAPEUTIC ACTIVITIES: CPT | Mod: PN

## 2024-08-14 ENCOUNTER — HOSPITAL ENCOUNTER (OUTPATIENT)
Dept: RADIOLOGY | Facility: HOSPITAL | Age: 49
Discharge: HOME | End: 2024-08-14
Attending: ORTHOPAEDIC SURGERY
Payer: COMMERCIAL

## 2024-08-14 ENCOUNTER — OFFICE VISIT (OUTPATIENT)
Dept: ORTHOPEDICS | Facility: CLINIC | Age: 49
End: 2024-08-14
Payer: COMMERCIAL

## 2024-08-14 VITALS
SYSTOLIC BLOOD PRESSURE: 144 MMHG | HEART RATE: 67 BPM | TEMPERATURE: 97 F | DIASTOLIC BLOOD PRESSURE: 74 MMHG | HEIGHT: 63 IN | BODY MASS INDEX: 39.69 KG/M2 | WEIGHT: 224 LBS

## 2024-08-14 DIAGNOSIS — M79.661 PAIN IN RIGHT LOWER LEG: ICD-10-CM

## 2024-08-14 DIAGNOSIS — G57.31 RIGHT PERONEAL NERVE PALSY: ICD-10-CM

## 2024-08-14 DIAGNOSIS — S82.261D: ICD-10-CM

## 2024-08-14 DIAGNOSIS — M79.661 PAIN OF RIGHT LOWER LEG: ICD-10-CM

## 2024-08-14 DIAGNOSIS — M79.661 PAIN OF RIGHT LOWER LEG: Primary | ICD-10-CM

## 2024-08-14 DIAGNOSIS — I89.0 LYMPHEDEMA: ICD-10-CM

## 2024-08-14 DIAGNOSIS — S82.261D CLOSED DISPLACED SEGMENTAL FRACTURE OF SHAFT OF RIGHT TIBIA WITH ROUTINE HEALING, SUBSEQUENT ENCOUNTER: Primary | ICD-10-CM

## 2024-08-14 DIAGNOSIS — S84.01XD: ICD-10-CM

## 2024-08-14 PROCEDURE — 3078F DIAST BP <80 MM HG: CPT | Mod: CPTII,S$GLB,, | Performed by: ORTHOPAEDIC SURGERY

## 2024-08-14 PROCEDURE — 99999 PR PBB SHADOW E&M-EST. PATIENT-LVL III: CPT | Mod: PBBFAC,,, | Performed by: ORTHOPAEDIC SURGERY

## 2024-08-14 PROCEDURE — 73590 X-RAY EXAM OF LOWER LEG: CPT | Mod: TC,RT

## 2024-08-14 PROCEDURE — 1160F RVW MEDS BY RX/DR IN RCRD: CPT | Mod: CPTII,S$GLB,, | Performed by: ORTHOPAEDIC SURGERY

## 2024-08-14 PROCEDURE — 99213 OFFICE O/P EST LOW 20 MIN: CPT | Mod: S$GLB,,, | Performed by: ORTHOPAEDIC SURGERY

## 2024-08-14 PROCEDURE — 3077F SYST BP >= 140 MM HG: CPT | Mod: CPTII,S$GLB,, | Performed by: ORTHOPAEDIC SURGERY

## 2024-08-14 PROCEDURE — 73590 X-RAY EXAM OF LOWER LEG: CPT | Mod: 26,RT,, | Performed by: RADIOLOGY

## 2024-08-14 PROCEDURE — 3008F BODY MASS INDEX DOCD: CPT | Mod: CPTII,S$GLB,, | Performed by: ORTHOPAEDIC SURGERY

## 2024-08-14 PROCEDURE — 1159F MED LIST DOCD IN RCRD: CPT | Mod: CPTII,S$GLB,, | Performed by: ORTHOPAEDIC SURGERY

## 2024-08-14 RX ORDER — GABAPENTIN 600 MG/1
600 TABLET ORAL DAILY
Qty: 30 TABLET | Refills: 2 | Status: SHIPPED | OUTPATIENT
Start: 2024-08-14 | End: 2024-11-12

## 2024-08-14 NOTE — PROGRESS NOTES
"    Date of Surgery     (2024)  Surgery                   RIGHT Tibia IMN       Chief Complaint: Pain of the Right Lower Leg      HPI: Harrison Estevez is a 49 y.o. female who is here for follow-up of her surgery.  She reports the leg is doing well.  The lymphedema sleeve is helping.  Her foot still has some swelling.  She is still frustrated by her foot feeling weird.      There is a "pea" sized hardspot on the front of her shin that bothers her.      She is still working with PT.    She is taking 600mg of gabapentin at bedtime.     She and her family were able to go on vacation.    Past Medical History:   Diagnosis Date    Abnormal Pap smear 2011    Cryosurgery done    General anesthetics causing adverse effect in therapeutic use     slow to wake up following        Past Surgical History:   Procedure Laterality Date    ANKLE SURGERY      APPENDECTOMY      GYNECOLOGIC CRYOSURGERY  2011    HYSTERECTOMY      INSERTION OF INTRAMEDULLARY NAIL INTO TIBIA Right 2024    Procedure: INSERTION, INTRAMEDULLARY JANIS, TIBIA;  Surgeon: Brenda Henson DO;  Location: [facility] OR;  Service: Orthopedics;  Laterality: Right;    OOPHORECTOMY      ROBOT-ASSISTED LAPAROSCOPIC ABDOMINAL HYSTERECTOMY USING DA NICKY XI N/A 2023    Procedure: XI ROBOTIC HYSTERECTOMY;  Surgeon: BHUMI Shine MD;  Location: [facility] OR;  Service: OB/GYN;  Laterality: N/A;       Family History   Problem Relation Name Age of Onset    Breast cancer Maternal Grandmother      Breast cancer Maternal Aunt         Social History     Socioeconomic History    Marital status:    Tobacco Use    Smoking status: Never    Smokeless tobacco: Never   Substance and Sexual Activity    Alcohol use: Yes     Comment: occassional    Drug use: No    Sexual activity: Yes     Partners: Male       Current Outpatient Medications   Medication Instructions    gabapentin (NEURONTIN) 600 mg, Oral, Daily    naproxen (NAPROSYN) 500 mg, Oral, 2 " times daily       Review of patient's allergies indicates:   Allergen Reactions    Penicillanic sulfone bl beta-lactamase inhibitors Hives    Penicillins        Physical Exam:     Wt Readings from Last 1 Encounters:   08/14/24 101.6 kg (223 lb 15.8 oz)     Temp Readings from Last 1 Encounters:   08/14/24 97.3 °F (36.3 °C) (Oral)     BP Readings from Last 1 Encounters:   08/14/24 (!) 144/74     Pulse Readings from Last 1 Encounters:   08/14/24 67           General Appearance:   NAD, well appearing, cooperative    Neurologic:  Alert and oriented x3    Pysch:  Age appropriate    GAIT:  Smooth concentric gait without assistive device    Musculoskeletal:     Right leg:  Incisions nicely healed.  No erythema. Swelling moderate.  Ecchymosis resolved.  AROM of knee 0-120.  Improved ankle plantar and dorsiflexion.  Fair inversion/eversion.  Small nodule that is freely mobile palpated that correlates with one of calcium depositis noted on XR.   Distal neurovascular status intact.                        IMAGING: RIGHT Tibia/fibula XR  IMN and locking screws in good position.  Good interval healing noted- nearly full at this time.    Assessment:       Encounter Diagnoses   Name Primary?    Closed displaced segmental fracture of shaft of right tibia with routine healing, subsequent encounter Yes    Pain in right lower leg     Right peroneal nerve palsy     Injury of right tibial nerve, subsequent encounter     Lymphedema               DISCUSSION:   Patient's symptoms, imaging, diagnosis and prognosis reviewed and discussed.  Discussed  healing progression.   Discussed weight bearing status and the progression Discussed the need for compliance with treatment.     Patient given an opportunity to ask questions.       Plan:       Harrison was seen today for pain.    Diagnoses and all orders for this visit:    Closed displaced segmental fracture of shaft of right tibia with routine healing, subsequent encounter    Pain in right lower  leg    Right peroneal nerve palsy  -     gabapentin (NEURONTIN) 600 MG tablet; Take 1 tablet (600 mg total) by mouth once daily.    Injury of right tibial nerve, subsequent encounter  -     gabapentin (NEURONTIN) 600 MG tablet; Take 1 tablet (600 mg total) by mouth once daily.    Lymphedema         Continue PT which is working on gait.  Ice/elevate right leg to help decrease pain and swelling  Continue with lympedema treatment  Consider removal of the calcium deposit under local  Follow-up in 6 weeks with XR     The patient understands, chooses and consents to this plan and accepts all   the risks which include but are not limited to the risks mentioned above.             Brenda Henson, DO, CAQSM, [facility]  Orthopaedic Surgeon

## 2024-08-15 ENCOUNTER — CLINICAL SUPPORT (OUTPATIENT)
Dept: REHABILITATION | Facility: HOSPITAL | Age: 49
End: 2024-08-15
Payer: COMMERCIAL

## 2024-08-15 DIAGNOSIS — R52 PAIN: Primary | ICD-10-CM

## 2024-08-15 DIAGNOSIS — R26.9 GAIT DIFFICULTY: ICD-10-CM

## 2024-08-15 DIAGNOSIS — R53.1 WEAKNESS: ICD-10-CM

## 2024-08-15 PROCEDURE — 97112 NEUROMUSCULAR REEDUCATION: CPT | Mod: PN,CQ

## 2024-08-15 PROCEDURE — 97530 THERAPEUTIC ACTIVITIES: CPT | Mod: PN,CQ

## 2024-08-15 PROCEDURE — 97110 THERAPEUTIC EXERCISES: CPT | Mod: PN,CQ

## 2024-08-15 RX ORDER — NAPROXEN 500 MG/1
500 TABLET ORAL 2 TIMES DAILY
Qty: 60 TABLET | Refills: 1 | Status: SHIPPED | OUTPATIENT
Start: 2024-08-15

## 2024-08-15 NOTE — PROGRESS NOTES
[facility]   Physical Therapy Treatment Note      Name: Harrison FU [facility] Number: [id number]    Therapy Diagnosis:   Encounter Diagnoses   Name Primary?    Pain Yes    Weakness     Gait difficulty            Physician: Brenda Henson DO    Visit Date: 8/15/2024    Physician Orders: PT Eval and Treat   Medical Diagnosis from Referral:  Closed displaced segmental fracture of shaft of right tibia with routine healing, subsequent encounter   Closed fracture of shaft of right fibula with routine healing, unspecified fracture morphology, subsequent encounter  Evaluation Date: 3/13/2024  Authorization Period Expiration: 12/31/2024  Plan of Care Expiration: 8/30/24  Progress Note Due: 30 days  Visit # / Visits authorized: 19/ 20 + eval (episode 32)  FOTO: 5/5  Precautions: Standard, status post ORIF of right tibia with intramedullary nail with DOS on 4/1/24.  PTA Visit #: 1/5    Time In: 1:28pm  Time Out: 2:30pm  Total Time: 56 minutes    Subjective     Patient reports: seeing MD yesterday for an updated Xray. Patient reports MD stated still a fracture present and also saw a small calcium buildup on right ankle that she would scheduled a procedure to remove in September. Patient also reports MD was going to write a referral for patient to see a podiatrist concerning foot.  She was partially compliant with home exercise program.  Response to previous treatment: workout soreness   Functional change: n/a    Pain: 3/10  Location: right top of  foot      Objective    7/22/24 R knee AROM 0-  115 degrees, AROM R DF -10 from neutral, 50 PF, inversion/ eversion 20 degrees/ eversion painful, MMT DF 3+/5, inversion 3/5, eversion 3+/5, R knee extension 4/5, knee flexion 4/5    7/22/24 FOTO completed       Treatment     Harrison received the treatments listed below:      therapeutic exercises to develop strength, endurance, ROM, and flexibility for 17  minutes including:    Elliptical  for ROM,  "strength, and endurance x 6 minutes R:1  Wobble board standing f/b 3 minutes  Ankle TB with G TB 1 x 20 each direction  Slantboard, gastroc; 5 x20"  Lateral walking in mini squat; 3 x 6 steps to left and right    manual therapy techniques: Myofacial release and Soft tissue Mobilization were applied to the: right lower extremity for 00 minutes, including:    Ankle PROM  Manual gastroc stretch    neuromuscular re-education activities to improve: Balance, Coordination, Kinesthetic, and Proprioception for 13 minutes. The following activities were included:    Standing toe and heel raises 3 x 10 reps  Shuttle 6 bands DL 3 bands / 4 bands SL 1 minute x 2  SLS; 5 x10" each    therapeutic activities to improve functional performance for 24 minutes, including:    Single leg blaze pods, color catch, 3 cycles 30 sec durations each leg  Sit to stands from 20" box 3 x 10 reps; 7. 5# KB with a stagger stance  RDL into squats to tap 4 various heights working closer to the ground and back; 4 sets (24 total reps)  Step Ups stairs; x20 each    Gait training for 00 minutes including:    Patient Education and Home Exercises       Education provided:   Written Home Exercises Provided: Patient instructed to cont prior HEP. Exercises were reviewed and Harrison was able to demonstrate them prior to the end of the session.  Prenda demonstrated good  understanding of the education provided. See Electronic Medical Record under Patient Instructions for exercises provided during therapy sessions    Assessment     Continued attempting to improve patient's lower extremity strength, ankle Range of Motion, and standing tolerance. Patient demonstrated improved tolerance with prescribed activities without limitations or exacerbations of ankle/foot pain being present. Patient required minimal seated rest breaks throughout standing activities today as well. Discussed with patient about potentially looking into a gym membership to help keep her " accountable with exercise and continuing to progress to her own goals.    Harrison Is progressing well towards her goals.   Patient prognosis is Good.     Patient will continue to benefit from skilled outpatient physical therapy to address the deficits listed in the problem list box on initial evaluation, provide pt/family education and to maximize pt's level of independence in the home and community environment.     Patient's spiritual, cultural and educational needs considered and pt agreeable to plan of care and goals.     Anticipated barriers to physical therapy: attendance     Goals:   STG's 2 weeks  Patient will be independent with 50% of HEP MET 4/10/24     LTG's 10 weeks  Patient will improve FOTO functional measure  score  to 65 %  in order to improve overall QOL & return to PLOF Progressing 44 4/10/24/ 62 5/29/24/ MET 67 7/22/24  2. Patient will report an overall decrease in pain with ADL's and functional mobility Progressing/ pain ranges from 0-5/10 7/22/24  3. Patient will increase strength by at least 1/2 muscle grade in affected musculature to improve functional mobility MET 5/29/24  4. Patient will improve R knee and ankle ROM to improve functional mobility and ACTIVITIES OF DAILY LIVING's MET 7/22/24  5. Patient will be more aware of posture throughout the day to reduce stress  and maintain optimal alignment of the spine MET 7/22/24  6. Patient will be independent with HEP Progressing  7. Patient will increase strength by at least 1 muscle grade in affected musculature to improve functional mobility Progressing with ankle/ knee met 7/22/24    Plan     Plan of care Certification: 7/22/24- 8/30/24     Outpatient Physical Therapy 2-3  times weekly for 10 weeks to include the following interventions: Electrical Stimulation PRN, Gait Training, Manual Therapy, Moist Heat/ Ice, Neuromuscular Re-ed, Patient Education, Self Care, Therapeutic Activities, Therapeutic Exercise, and Ultrasound, ASTYM, Kinesiotaping  PRN, Functional Dry Needling    Rohan Dominguez, ALANA

## 2024-09-23 ENCOUNTER — HOSPITAL ENCOUNTER (OUTPATIENT)
Dept: RADIOLOGY | Facility: HOSPITAL | Age: 49
Discharge: HOME | End: 2024-09-23
Attending: ORTHOPAEDIC SURGERY
Payer: COMMERCIAL

## 2024-09-23 ENCOUNTER — OFFICE VISIT (OUTPATIENT)
Dept: ORTHOPEDICS | Facility: CLINIC | Age: 49
End: 2024-09-23
Payer: COMMERCIAL

## 2024-09-23 ENCOUNTER — PATIENT MESSAGE (OUTPATIENT)
Dept: ORTHOPEDICS | Facility: CLINIC | Age: 49
End: 2024-09-23

## 2024-09-23 VITALS
HEART RATE: 74 BPM | WEIGHT: 224 LBS | DIASTOLIC BLOOD PRESSURE: 74 MMHG | BODY MASS INDEX: 39.69 KG/M2 | HEIGHT: 63 IN | SYSTOLIC BLOOD PRESSURE: 119 MMHG | TEMPERATURE: 98 F

## 2024-09-23 DIAGNOSIS — R22.41 NODULE OF SKIN OF RIGHT LOWER LEG: ICD-10-CM

## 2024-09-23 DIAGNOSIS — M79.661 PAIN OF RIGHT LOWER LEG: ICD-10-CM

## 2024-09-23 DIAGNOSIS — M79.661 PAIN OF RIGHT LOWER LEG: Primary | ICD-10-CM

## 2024-09-23 DIAGNOSIS — G57.31 RIGHT PERONEAL NERVE PALSY: Primary | ICD-10-CM

## 2024-09-23 DIAGNOSIS — S84.01XD: ICD-10-CM

## 2024-09-23 DIAGNOSIS — I89.0 LYMPHEDEMA: ICD-10-CM

## 2024-09-23 DIAGNOSIS — S82.261D CLOSED DISPLACED SEGMENTAL FRACTURE OF SHAFT OF RIGHT TIBIA WITH ROUTINE HEALING, SUBSEQUENT ENCOUNTER: ICD-10-CM

## 2024-09-23 PROCEDURE — 73590 X-RAY EXAM OF LOWER LEG: CPT | Mod: TC,RT

## 2024-09-23 PROCEDURE — 3044F HG A1C LEVEL LT 7.0%: CPT | Mod: CPTII,S$GLB,, | Performed by: ORTHOPAEDIC SURGERY

## 2024-09-23 PROCEDURE — 99999 PR PBB SHADOW E&M-EST. PATIENT-LVL IV: CPT | Mod: PBBFAC,,, | Performed by: ORTHOPAEDIC SURGERY

## 2024-09-23 PROCEDURE — 3008F BODY MASS INDEX DOCD: CPT | Mod: CPTII,S$GLB,, | Performed by: ORTHOPAEDIC SURGERY

## 2024-09-23 PROCEDURE — 99213 OFFICE O/P EST LOW 20 MIN: CPT | Mod: S$GLB,,, | Performed by: ORTHOPAEDIC SURGERY

## 2024-09-23 PROCEDURE — 73590 X-RAY EXAM OF LOWER LEG: CPT | Mod: 26,RT,, | Performed by: RADIOLOGY

## 2024-09-23 PROCEDURE — 3074F SYST BP LT 130 MM HG: CPT | Mod: CPTII,S$GLB,, | Performed by: ORTHOPAEDIC SURGERY

## 2024-09-23 PROCEDURE — 3078F DIAST BP <80 MM HG: CPT | Mod: CPTII,S$GLB,, | Performed by: ORTHOPAEDIC SURGERY

## 2024-09-23 NOTE — PROGRESS NOTES
"    Date of Surgery     (2024)  Surgery                   RIGHT Tibia IMN       Chief Complaint: Right leg pain      HPI: Harrison Estevez is a 49 y.o. female who is here for follow-up of her surgery.  She reports the leg is doing well.  The lymphedema sleeve is helping.  Her foot still has some swelling.  She is still frustrated by her foot feeling weird.      There is a "pea" sized hardspot on the front of her shin that bothers her.      She is still working with PT.    She is taking 600mg of gabapentin at bedtime.     She and her family were able to go on vacation.    Past Medical History:   Diagnosis Date    Abnormal Pap smear 2011    Cryosurgery done    General anesthetics causing adverse effect in therapeutic use     slow to wake up following        Past Surgical History:   Procedure Laterality Date    ANKLE SURGERY      APPENDECTOMY      GYNECOLOGIC CRYOSURGERY  2011    HYSTERECTOMY      INSERTION OF INTRAMEDULLARY NAIL INTO TIBIA Right 2024    Procedure: INSERTION, INTRAMEDULLARY JANIS, TIBIA;  Surgeon: Brenda Henson DO;  Location: [facility] OR;  Service: Orthopedics;  Laterality: Right;    OOPHORECTOMY      ROBOT-ASSISTED LAPAROSCOPIC ABDOMINAL HYSTERECTOMY USING DA NICKY XI N/A 2023    Procedure: XI ROBOTIC HYSTERECTOMY;  Surgeon: BHUMI Shine MD;  Location: [facility] OR;  Service: OB/GYN;  Laterality: N/A;       Family History   Problem Relation Name Age of Onset    Breast cancer Maternal Grandmother      Breast cancer Maternal Aunt         Social History     Socioeconomic History    Marital status:    Tobacco Use    Smoking status: Never    Smokeless tobacco: Never   Substance and Sexual Activity    Alcohol use: Yes     Comment: occassional    Drug use: No    Sexual activity: Yes     Partners: Male       Current Outpatient Medications   Medication Instructions    gabapentin (NEURONTIN) 600 mg, Oral, Daily    naproxen (NAPROSYN) 500 mg, Oral, 2 times daily "       Review of patient's allergies indicates:   Allergen Reactions    Penicillanic sulfone bl beta-lactamase inhibitors Hives    Penicillins        Physical Exam:     Wt Readings from Last 1 Encounters:   09/23/24 101.6 kg (223 lb 15.8 oz)     Temp Readings from Last 1 Encounters:   09/23/24 98.2 °F (36.8 °C)     BP Readings from Last 1 Encounters:   09/23/24 119/74     Pulse Readings from Last 1 Encounters:   09/23/24 74           General Appearance:   NAD, well appearing, cooperative    Neurologic:  Alert and oriented x3    Pysch:  Age appropriate    GAIT:  Smooth concentric gait without assistive device    Musculoskeletal:     Right leg:  Incisions nicely healed.  No erythema. Swelling moderate.  Ecchymosis resolved.  AROM of knee 0-120.  Improved ankle plantar and dorsiflexion.  Fair inversion/eversion.  Small nodule that is freely mobile palpated that correlates with one of calcium depositis noted on XR.   Distal neurovascular status intact.                        IMAGING: RIGHT Tibia/fibula XR  IMN and locking screws in good position.  Good interval healing noted- nearly full at this time.    Assessment:       Encounter Diagnoses   Name Primary?    Closed displaced segmental fracture of shaft of right tibia with routine healing, subsequent encounter Yes    Injury of right tibial nerve, subsequent encounter     Right peroneal nerve palsy     Lymphedema               DISCUSSION:   Patient's symptoms, imaging, diagnosis and prognosis reviewed and discussed.  Discussed  healing progression.   Discussed weight bearing status and the progression Discussed the need for compliance with treatment.     Patient given an opportunity to ask questions.       Plan:       Diagnoses and all orders for this visit:    Closed displaced segmental fracture of shaft of right tibia with routine healing, subsequent encounter    Injury of right tibial nerve, subsequent encounter    Right peroneal nerve palsy    Lymphedema          Continue PT which is working on gait.  Ice/elevate right leg to help decrease pain and swelling  Continue with lympedema treatment  Consider removal of the calcium deposit under local -either by me or general surgery  Follow-up in 6 weeks with XR     The patient understands, chooses and consents to this plan and accepts all   the risks which include but are not limited to the risks mentioned above.             Brenda Henson, DO, CAQSM, [facility]  Orthopaedic Surgeon

## 2024-09-23 NOTE — PATIENT INSTRUCTIONS
Think about whether you want a temporary brace for your foot.  Let Dr. Henson know.    PT consult placed for them to work on your gait    You can go to the gym and use an elliptical or a bicycle.    Use the voltaren gel up to 4 times per day on the sore spot on your knee.

## 2024-09-27 ENCOUNTER — CLINICAL SUPPORT (OUTPATIENT)
Dept: REHABILITATION | Facility: HOSPITAL | Age: 49
End: 2024-09-27
Attending: ORTHOPAEDIC SURGERY
Payer: COMMERCIAL

## 2024-09-27 DIAGNOSIS — G57.31 RIGHT PERONEAL NERVE PALSY: ICD-10-CM

## 2024-09-27 DIAGNOSIS — I89.0 LYMPHEDEMA: ICD-10-CM

## 2024-09-27 DIAGNOSIS — S84.01XD: ICD-10-CM

## 2024-09-27 PROCEDURE — 97110 THERAPEUTIC EXERCISES: CPT | Performed by: PHYSICAL THERAPIST

## 2024-09-27 PROCEDURE — 97162 PT EVAL MOD COMPLEX 30 MIN: CPT | Performed by: PHYSICAL THERAPIST

## 2024-09-27 PROCEDURE — 97162 PT EVAL MOD COMPLEX 30 MIN: CPT

## 2024-09-27 PROCEDURE — 97535 SELF CARE MNGMENT TRAINING: CPT | Performed by: PHYSICAL THERAPIST

## 2024-09-27 PROCEDURE — 97110 THERAPEUTIC EXERCISES: CPT

## 2024-09-27 PROCEDURE — 97535 SELF CARE MNGMENT TRAINING: CPT
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
## 2024-09-28 PROBLEM — R52 PAIN: Status: RESOLVED | Noted: 2024-03-13 | Resolved: 2024-09-28

## 2024-09-28 PROBLEM — R53.1 WEAKNESS: Status: RESOLVED | Noted: 2024-03-13 | Resolved: 2024-09-28

## 2024-09-28 PROBLEM — R26.9 GAIT DIFFICULTY: Status: RESOLVED | Noted: 2024-03-13 | Resolved: 2024-09-28

## 2024-10-02 ENCOUNTER — OFFICE VISIT (OUTPATIENT)
Dept: OBSTETRICS AND GYNECOLOGY | Facility: CLINIC | Age: 49
End: 2024-10-02
Payer: COMMERCIAL

## 2024-10-02 ENCOUNTER — PATIENT MESSAGE (OUTPATIENT)
Dept: OBSTETRICS AND GYNECOLOGY | Facility: CLINIC | Age: 49
End: 2024-10-02

## 2024-10-02 ENCOUNTER — LAB VISIT (OUTPATIENT)
Dept: LAB | Facility: HOSPITAL | Age: 49
End: 2024-10-02
Attending: OBSTETRICS & GYNECOLOGY
Payer: COMMERCIAL

## 2024-10-02 VITALS
SYSTOLIC BLOOD PRESSURE: 116 MMHG | BODY MASS INDEX: 43.48 KG/M2 | DIASTOLIC BLOOD PRESSURE: 78 MMHG | HEIGHT: 63 IN | WEIGHT: 245.38 LBS

## 2024-10-02 DIAGNOSIS — N95.1 MENOPAUSAL SYMPTOMS: ICD-10-CM

## 2024-10-02 DIAGNOSIS — N99.3 PROLAPSE OF VAGINAL CUFF AFTER HYSTERECTOMY: Primary | ICD-10-CM

## 2024-10-02 DIAGNOSIS — N95.2 VAGINAL ATROPHY: ICD-10-CM
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
## 2024-10-02 LAB — FSH SERPL-ACNC: 47.49 MIU/ML

## 2024-10-02 PROCEDURE — 3074F SYST BP LT 130 MM HG: CPT | Mod: CPTII,S$GLB,, | Performed by: OBSTETRICS & GYNECOLOGY

## 2024-10-02 PROCEDURE — 99213 OFFICE O/P EST LOW 20 MIN: CPT | Mod: S$GLB,,, | Performed by: OBSTETRICS & GYNECOLOGY

## 2024-10-02 PROCEDURE — 99999 PR PBB SHADOW E&M-EST. PATIENT-LVL III: CPT | Mod: PBBFAC,,, | Performed by: OBSTETRICS & GYNECOLOGY

## 2024-10-02 PROCEDURE — 3008F BODY MASS INDEX DOCD: CPT | Mod: CPTII,S$GLB,, | Performed by: OBSTETRICS & GYNECOLOGY

## 2024-10-02 PROCEDURE — 1160F RVW MEDS BY RX/DR IN RCRD: CPT | Mod: CPTII,S$GLB,, | Performed by: OBSTETRICS & GYNECOLOGY

## 2024-10-02 PROCEDURE — 3078F DIAST BP <80 MM HG: CPT | Mod: CPTII,S$GLB,, | Performed by: OBSTETRICS & GYNECOLOGY

## 2024-10-02 PROCEDURE — 1159F MED LIST DOCD IN RCRD: CPT | Mod: CPTII,S$GLB,, | Performed by: OBSTETRICS & GYNECOLOGY

## 2024-10-02 PROCEDURE — 36415 COLL VENOUS BLD VENIPUNCTURE: CPT | Performed by: OBSTETRICS & GYNECOLOGY

## 2024-10-02 PROCEDURE — 83001 ASSAY OF GONADOTROPIN (FSH): CPT | Performed by: OBSTETRICS & GYNECOLOGY

## 2024-10-02 RX ORDER — ESTRADIOL 0.1 MG/G
1 CREAM VAGINAL
Qty: 42.5 G | Refills: 12 | Status: SHIPPED | OUTPATIENT
Start: 2024-10-02 | End: 2028-04-13

## 2024-10-02 NOTE — PROGRESS NOTES
"  Subjective:       Patient ID: Harrison Estevez is a 49 y.o. female.    Chief Complaint:  No chief complaint on file.      History of Present Illness  HPI  Robotic hysterectomy for myomas in    Now with urge to void with normal urinalysis and no refugio along with bulging at the vaginal opening   No stool problems   Vaginal dryness noted and is concerned about remaining ovary function     Health Maintenance   Topic Date Due    TETANUS VACCINE  Never done    Colorectal Cancer Screening  Never done    Shingles Vaccine (1 of 2) Never done    Mammogram  2025    Lipid Panel  12/15/2028    Hepatitis C Screening  Completed     GYN & OB History  Patient's last menstrual period was 2023.   Date of Last Pap: 2023    OB History    Para Term  AB Living   3 3 3     3   SAB IAB Ectopic Multiple Live Births           3      # Outcome Date GA Lbr Indra/2nd Weight Sex Type Anes PTL Lv   3 Term 08    F CS-LTranv   SARAY   2 Term 89    F Vag-Spont   SARAY   1 Term 89    M Vag-Spont   SARAY       Review of Systems  Review of Systems        Objective:   /78   Ht 5' 3" (1.6 m)   Wt 111.3 kg (245 lb 6 oz)   LMP 2023   BMI 43.47 kg/m²    Physical Exam:             Abdominal: Soft. Bowel sounds are normal. She exhibits no distension, no mass and no abdominal incision. There is no abdominal tenderness. There is no guarding. No hernia.     Genitourinary:    Inguinal canal and rectum normal.   The external female genitalia was normal.   There is no rash, tenderness or lesion on the right labia. There is no rash, tenderness or lesion on the left labia. Right adnexum displays no mass, no tenderness and no fullness. Left adnexum displays no mass, no tenderness and no fullness. There is unspecified prolapse of vaginal walls (grade 3) in the vagina. No vaginal discharge, tenderness, bleeding, rectocele or cystocele in the vagina.    No foreign body in the vagina.      No signs of injury in " the vagina.   Vaginal atrophy noted. Cervix is absent.Uterus is absent. Normal urethral meatus.Urethra findings: no tendernessBladder findings: no bladder tenderness                     Assessment:        1. 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 Vaginal atrophy    3. 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    Menopausal symptoms  -     FOLLICLE STIMULATING HORMONE; Future

## 2024-10-03 ENCOUNTER — PATIENT MESSAGE (OUTPATIENT)
Dept: OBSTETRICS AND GYNECOLOGY | Facility: CLINIC | Age: 49
End: 2024-10-03
Payer: COMMERCIAL

## 2024-10-03 DIAGNOSIS — Z11.3 SCREEN FOR STD (SEXUALLY TRANSMITTED DISEASE): Primary | ICD-10-CM

## 2024-10-03 NOTE — TELEPHONE ENCOUNTER
Called patient and she state she requested std testing and did not see the results in her mychart. Informed her that they were not ordered, but I will send this message to Dr. Paul Shine. Pt. Also stated that she thought her estrogen levels were going to be checked. She is also asking if she is a good candidate for wegovy?    Please advise.

## 2024-10-04 ENCOUNTER — CLINICAL SUPPORT (OUTPATIENT)
Dept: REHABILITATION | Facility: HOSPITAL | Age: 49
End: 2024-10-04
Payer: COMMERCIAL

## 2024-10-04 ENCOUNTER — TELEPHONE (OUTPATIENT)
Dept: FAMILY MEDICINE | Facility: CLINIC | Age: 49
End: 2024-10-04
Payer: COMMERCIAL

## 2024-10-04 DIAGNOSIS — S82.401A TIBIA/FIBULA FRACTURE, RIGHT, CLOSED, INITIAL ENCOUNTER: ICD-10-CM

## 2024-10-04 DIAGNOSIS — R29.898 WEAKNESS OF BOTH LOWER EXTREMITIES: ICD-10-CM

## 2024-10-04 DIAGNOSIS — S82.201A TIBIA/FIBULA FRACTURE, RIGHT, CLOSED, INITIAL ENCOUNTER: ICD-10-CM

## 2024-10-04 DIAGNOSIS — I89.0 LYMPHEDEMA OF RIGHT LOWER EXTREMITY: Primary | ICD-10-CM

## 2024-10-04 DIAGNOSIS — R26.2 DIFFICULTY IN WALKING INVOLVING ANKLE AND FOOT JOINT: ICD-10-CM

## 2024-10-04 DIAGNOSIS — G57.31 RIGHT PERONEAL NERVE PALSY: ICD-10-CM

## 2024-10-04 PROCEDURE — 97530 THERAPEUTIC ACTIVITIES: CPT

## 2024-10-04 PROCEDURE — 97112 NEUROMUSCULAR REEDUCATION: CPT

## 2024-10-04 PROCEDURE — 97110 THERAPEUTIC EXERCISES: CPT

## 2024-10-04 NOTE — TELEPHONE ENCOUNTER
----- Message from Arjun sent at 10/4/2024  2:27 PM CDT -----  Contact: Harrison  .Type:  Sooner Apoointment Request    Caller is requesting a sooner appointment.  Caller declined first available appointment listed below.  Caller will not accept being placed on the waitlist and is requesting a message be sent to doctor.  Name of Caller: Harrison   When is the first available appointment? Not available date in Epic   Symptoms: To establish care   Would the patient rather a call back or a response via "[employer]ner?  Call back   Best Call Back Number: [phone]   Additional Information:          Thanks

## 2024-10-04 NOTE — TELEPHONE ENCOUNTER
Called pt to set up an [facility] care appt. Informed her that Dr. Francois wasn't taking any new pts at this time and offered her to set up with . Pt declined said she would keep looking.

## 2024-10-04 NOTE — PROGRESS NOTES
[facility]   Physical Therapy Treatment Note        Name: Harrison Estevez  Clinic Number: [id number]    Therapy Diagnosis:   Encounter Diagnoses   Name Primary?    Lymphedema of right lower extremity Yes    Right peroneal nerve palsy     Tibia/fibula fracture, right, closed, initial encounter     Weakness of both lower extremities     Difficulty in walking involving ankle and foot joint      Physician: Brenda Henson DO    Visit Date: 10/4/2024    Physician Orders: PT Eval and Treat  Medical Diagnosis from Referral: Injury of right tibial nerve, subsequent encounter; Right peroneal nerve palsy, Lymphedema  Evaluation Date: 9/27/2024  Authorization Period Expiration: 12/31/24  Plan of Care Expiration: 12/27/2024  Progress Note Due: 10/27/2024  Visit # / Visits authorized: 1/20 (+1 for auth)   FOTO: 1/3 (last performed on 9/27/2024)     Precautions: Standard  PTA Visit #: 0/5     Time In: 3:30 PM  Time Out: 4:30 PM   Total Billable Time: 50 minutes (Billing reflects 1 on 1 treatment time spent with patient)    Subjective     Patient reports: She has been having some pain on the top of her foot and around her great toe. She wanted to inquire again about our lymphedema specialist here that she was told about on day 1.    He/She was compliant with home exercise program.  Response to previous treatment: Initial Eval   Functional change: n/a    Pain: 2/10     Location: right calf, foot     Objective      Objective Measures updated at progress report or POC update only unless otherwise noted.       Treatment     Harrison received the treatments listed below:       CPT Intervention Performed   Today Duration / Intensity   MT      TE Recumbent Bike x 8 minutes     Ankle 4 Way  x 30x each way RTB     Leg Press        Heel and toe Raises x 30x each    NMR NMES: Anterior Tib x  10 mins DF and Eversion     MOBO Board   x Odd and evens 2 min each     Toe Yoga x 2 mins     Sidelying hip abduction x 3x10  "each side     Bridges x  3x10    TA Lateral Band walks - 3 laps at the mat     Box Squats x 24" 3x10  (increased time for rest breaks and form cueing)     Sled push Focus on full Heel to toe push off                                    PLAN             Plan for Next Visit: SLR with PPT, hip adduction, prone hip extension, quadruped hydrants/donkey kicks.  CPT Codes available for Billing:   (00) minutes of Manual therapy (MT) to improve pain and ROM.  (10) minutes of Therapeutic Exercise (TE) to develop strength, endurance, range of motion, and flexibility.  (30) minutes of Neuromuscular Re-Education (NMR)  to improve: Balance, Coordination, Kinesthetic, Sense, Proprioception, and Posture.  (10) minutes of Therapeutic Activities (TA) to improve functional performance.  Vasopneumatic Device Therapy () for management of swelling/edema. (25375)  Unattended Electrical Stimulation (ES) for muscle performance or pain modulation.  BFR: Blood flow restriction applied during exercise      Patient Education and Home Exercises       Home Exercises Provided and Patient Education Provided     Education provided: (Included in treatment time) minutes  PURPOSE: Patient educated on the impairments noted above and the effects of physical therapy intervention to improve overall condition and QOL.   EXERCISE: Patient was educated on all the above exercise prior/during/after for proper posture, positioning, and execution for safe performance with home exercise program.   STRENGTH: Patient educated on the importance of improved core and extremity strength in order to improve alignment of the spine and extremities with static positions and dynamic movement.   GAIT & BALANCE: Patient educated on the importance of strong core and lower extremity musculature in order to improve both static and dynamic balance, improve gait mechanics, reduce fall risk and improve household and community mobility.   TRANSFERS & TRANSITIONS: Patient educated on " proper technique for bed mobility, transitions and transfers to improve body mechanics and decrease risk of injury.     Written Home Exercises Provided: yes.  Exercises were reviewed and Harrison was able to demonstrate them prior to the end of the session.  Harrison demonstrated good  understanding of the education provided. See EMR under Patient Instructions for exercises provided during therapy sessions.    Assessment     Patient tolerated this session moderately well. She was slightly limited in ankle exercises due to some feelings of pain on the top of her foot around the great toe area. She needed increased rest breaks sit to stands and other exercises due to muscle fatigue.     Harrison is progressing well towards her goals.   Patient prognosis is Fair. if patient is consistent and compliant with Physical Therapy and home exercise program.     Patient will continue to benefit from skilled outpatient physical therapy to address the deficits listed in the problem list box on initial evaluation, provide pt/family education and to maximize patient's level of independence in the home and community environment.     Patient's spiritual, cultural and educational needs considered and pt agreeable to plan of care and goals.     Anticipated Barriers for therapy: co-morbidities, sedentary lifestyle, chronicity of condition, lack of understanding of condition, adherence to treatment plan, financial limitations, insurance coverage, occupation, and coping style       Goals:  Reviewed: 9/27/2024       Short Term Goals: In 6 weeks  Progress Date   1.Patient to be educated on HEP. [x] Progressing  [] MET   [] Not MET   [] Not tested     2.Patient to increase ankle range of motion, in order to improve available range of motion for ADL's.  [x] Progressing  [] MET   [] Not MET  [] Not tested     3.Patient to increase bilateral LE strength by 1/2 grade, in order to improve endurance and increase ability to perform all functional  activities for increased time.  [x] Progressing  [] MET   [] Not MET  [] Not tested     4.Patient to have pain less than 2/10 at worst, to improve QOL. [x] Progressing  [] MET   [] Not MET  [] Not tested     5.Patient to improve score on the FOTO, to improve QOL. [x] Progressing  [] MET   [] Not MET  [] Not tested     6. Patient to improve score on 5 times sit to  order to decrease fall risk. [x] Progressing  [] MET   [] Not MET  [] Not tested        Long Term Goals: In 12 weeks Progress Date   1.Patient to improve score on the FOTO predicted score or better, to improve QOL. [x] Progressing  [] MET   [] Not MET  [] Not tested     2. Patient to increase bilateral LE strength to 4/5 or greater, in order to improve endurance and increase ability to perform all functional activities for increased time. [x] Progressing  [] MET   [] Not MET  [] Not tested     3. Patient to have decreased pain to 0-1/10 at worst, to improve QOL. [x] Progressing  [] MET   [] Not MET  [] Not tested     4. Patient to normalize score on 5 times sit to stand, in order to improve endurance and decrease fall risk. [x] Progressing  [] MET   [] Not MET  [] Not tested     5. Patient to perform daily activities including prolonged walking over 30 -45 min with only mild increased symptoms. [x] Progressing  [] MET   [] Not MET  [] Not tested        Plan     Continue Plan of Care (POC) and progress per patient tolerance. See treatment section for details on planned progressions next session.      Joe Abebe, PT

## 2024-10-07 ENCOUNTER — TELEPHONE (OUTPATIENT)
Dept: OBSTETRICS AND GYNECOLOGY | Facility: CLINIC | Age: 49
End: 2024-10-07
Payer: COMMERCIAL

## 2024-10-07 ENCOUNTER — CLINICAL SUPPORT (OUTPATIENT)
Dept: REHABILITATION | Facility: HOSPITAL | Age: 49
End: 2024-10-07
Payer: COMMERCIAL

## 2024-10-07 DIAGNOSIS — R29.898 WEAKNESS OF BOTH LOWER EXTREMITIES: ICD-10-CM

## 2024-10-07 DIAGNOSIS — S82.201A TIBIA/FIBULA FRACTURE, RIGHT, CLOSED, INITIAL ENCOUNTER: Primary | ICD-10-CM

## 2024-10-07 DIAGNOSIS — G57.31 RIGHT PERONEAL NERVE PALSY: ICD-10-CM

## 2024-10-07 DIAGNOSIS — R26.2 DIFFICULTY IN WALKING INVOLVING ANKLE AND FOOT JOINT: ICD-10-CM

## 2024-10-07 DIAGNOSIS — I89.0 LYMPHEDEMA OF RIGHT LOWER EXTREMITY: ICD-10-CM

## 2024-10-07 DIAGNOSIS — N99.3 PROLAPSE OF VAGINAL CUFF AFTER HYSTERECTOMY: Primary | ICD-10-CM

## 2024-10-07 DIAGNOSIS — S82.401A TIBIA/FIBULA FRACTURE, RIGHT, CLOSED, INITIAL ENCOUNTER: Primary | ICD-10-CM

## 2024-10-07 PROCEDURE — 97112 NEUROMUSCULAR REEDUCATION: CPT

## 2024-10-07 PROCEDURE — 97110 THERAPEUTIC EXERCISES: CPT

## 2024-10-07 PROCEDURE — 97530 THERAPEUTIC ACTIVITIES: CPT

## 2024-10-07 NOTE — PROGRESS NOTES
[facility]   Physical Therapy Treatment Note        Name: Harrison Estevez  [facility] Number: [id number]    Therapy Diagnosis:   Encounter Diagnoses   Name Primary?    Tibia/fibula fracture, right, closed, initial encounter Yes    Right peroneal nerve palsy     Lymphedema of right lower extremity     Weakness of both lower extremities     Difficulty in walking involving ankle and foot joint      Physician: Brenda Henson DO    Visit Date: 10/7/2024    Physician Orders: PT Eval and Treat  Medical Diagnosis from Referral: Injury of right tibial nerve, subsequent encounter; Right peroneal nerve palsy, Lymphedema  Evaluation Date: 9/27/2024  Authorization Period Expiration: 12/31/24  Plan of Care Expiration: 12/27/2024  Progress Note Due: 10/27/2024  Visit # / Visits authorized: 1/20 (+1 for auth)   FOTO: 1/3 (last performed on 9/27/2024)     Precautions: Standard  PTA Visit #: 0/5     Time In: 3:30 PM  Time Out: 4:30 PM   Total Billable Time: 50 minutes (Billing reflects 1 on 1 treatment time spent with patient)    Subjective     Patient reports: She is feeling a little bit better today compared to last visit and feels like the stretches are helping. She is feeling very tired today from work tho.     He/She was compliant with home exercise program.  Response to previous treatment: LE Fatigue  Functional change: Improved ankle pain with walking today    Pain: 2/10     Location: right calf, foot     Objective      Objective Measures updated at progress report or POC update only unless otherwise noted.       Treatment     Harrison received the treatments listed below:       CPT Intervention Performed   Today Duration / Intensity   MT      TE Recumbent Bike x 8 minutes     Ankle 4 Way  x 30x each way RTB     Leg Press        Heel and toe Raises x 30x each    NMR NMES: Anterior Tib x  10 mins DF and Eversion     MOBO Board   x Odd and evens 2 min each     Toe Yoga x 2 mins     Sidelying hip  "abduction x 3x10 each side     Bridges x  3x10    TA Lateral Band walks - 3 laps at the mat     Box Squats x 24" 3x10  (increased time for rest breaks and form cueing)     Sled push Focus on full Heel to toe push off                                    PLAN             Plan for Next Visit: SLR with PPT, hip adduction, prone hip extension, quadruped hydrants/donkey kicks.  CPT Codes available for Billing:   (00) minutes of Manual therapy (MT) to improve pain and ROM.  (10) minutes of Therapeutic Exercise (TE) to develop strength, endurance, range of motion, and flexibility.  (30) minutes of Neuromuscular Re-Education (NMR)  to improve: Balance, Coordination, Kinesthetic, Sense, Proprioception, and Posture.  (10) minutes of Therapeutic Activities (TA) to improve functional performance.  Vasopneumatic Device Therapy () for management of swelling/edema. (78036)  Unattended Electrical Stimulation (ES) for muscle performance or pain modulation.  BFR: Blood flow restriction applied during exercise      Patient Education and Home Exercises       Home Exercises Provided and Patient Education Provided     Education provided: (Included in treatment time) minutes  PURPOSE: Patient educated on the impairments noted above and the effects of physical therapy intervention to improve overall condition and QOL.   EXERCISE: Patient was educated on all the above exercise prior/during/after for proper posture, positioning, and execution for safe performance with home exercise program.   STRENGTH: Patient educated on the importance of improved core and extremity strength in order to improve alignment of the spine and extremities with static positions and dynamic movement.   GAIT & BALANCE: Patient educated on the importance of strong core and lower extremity musculature in order to improve both static and dynamic balance, improve gait mechanics, reduce fall risk and improve household and community mobility.   TRANSFERS & TRANSITIONS: " Patient educated on proper technique for bed mobility, transitions and transfers to improve body mechanics and decrease risk of injury.     Written Home Exercises Provided: yes.  Exercises were reviewed and Harrison was able to demonstrate them prior to the end of the session.  Harrison demonstrated good  understanding of the education provided. See EMR under Patient Instructions for exercises provided during therapy sessions.    Assessment     Patient tolerated this session moderately well. She had improved tolerance to ankle 4 way today and improved ankle isolation with inversion and eversion. She had some increased swelling in her knee today due to her lymphedema which was limiting her with sit to stands.     Harrison is progressing well towards her goals.   Patient prognosis is Fair. if patient is consistent and compliant with Physical Therapy and home exercise program.     Patient will continue to benefit from skilled outpatient physical therapy to address the deficits listed in the problem list box on initial evaluation, provide pt/family education and to maximize patient's level of independence in the home and community environment.     Patient's spiritual, cultural and educational needs considered and pt agreeable to plan of care and goals.     Anticipated Barriers for therapy: co-morbidities, sedentary lifestyle, chronicity of condition, lack of understanding of condition, adherence to treatment plan, financial limitations, insurance coverage, occupation, and coping style       Goals:  Reviewed: 9/27/2024       Short Term Goals: In 6 weeks  Progress Date   1.Patient to be educated on HEP. [x] Progressing  [] MET   [] Not MET   [] Not tested     2.Patient to increase ankle range of motion, in order to improve available range of motion for ADL's.  [x] Progressing  [] MET   [] Not MET  [] Not tested     3.Patient to increase bilateral LE strength by 1/2 grade, in order to improve endurance and increase ability to  perform all functional activities for increased time.  [x] Progressing  [] MET   [] Not MET  [] Not tested     4.Patient to have pain less than 2/10 at worst, to improve QOL. [x] Progressing  [] MET   [] Not MET  [] Not tested     5.Patient to improve score on the FOTO, to improve QOL. [x] Progressing  [] MET   [] Not MET  [] Not tested     6. Patient to improve score on 5 times sit to  order to decrease fall risk. [x] Progressing  [] MET   [] Not MET  [] Not tested        Long Term Goals: In 12 weeks Progress Date   1.Patient to improve score on the FOTO predicted score or better, to improve QOL. [x] Progressing  [] MET   [] Not MET  [] Not tested     2. Patient to increase bilateral LE strength to 4/5 or greater, in order to improve endurance and increase ability to perform all functional activities for increased time. [x] Progressing  [] MET   [] Not MET  [] Not tested     3. Patient to have decreased pain to 0-1/10 at worst, to improve QOL. [x] Progressing  [] MET   [] Not MET  [] Not tested     4. Patient to normalize score on 5 times sit to stand, in order to improve endurance and decrease fall risk. [x] Progressing  [] MET   [] Not MET  [] Not tested     5. Patient to perform daily activities including prolonged walking over 30 -45 min with only mild increased symptoms. [x] Progressing  [] MET   [] Not MET  [] Not tested        Plan     Continue Plan of Care (POC) and progress per patient tolerance. See treatment section for details on planned progressions next session.      Joe Abebe, PT

## 2024-10-07 NOTE — TELEPHONE ENCOUNTER
----- Message from BHUMI Shine MD sent at 10/7/2024  9:27 AM CDT -----  Colposacropexy, robotic approach   OK for the grove   Will need pre op appt to consent   Desires early November date

## 2024-10-08 ENCOUNTER — LAB VISIT (OUTPATIENT)
Dept: LAB | Facility: HOSPITAL | Age: 49
End: 2024-10-08
Attending: OBSTETRICS & GYNECOLOGY
Payer: COMMERCIAL

## 2024-10-08 DIAGNOSIS — Z11.3 SCREEN FOR STD (SEXUALLY TRANSMITTED DISEASE): ICD-10-CM

## 2024-10-08 LAB — TREPONEMA PALLIDUM IGG+IGM AB [PRESENCE] IN SERUM OR PLASMA BY IMMUNOASSAY: NONREACTIVE

## 2024-10-08 PROCEDURE — 80074 ACUTE HEPATITIS PANEL: CPT | Performed by: OBSTETRICS & GYNECOLOGY

## 2024-10-08 PROCEDURE — 87389 HIV-1 AG W/HIV-1&-2 AB AG IA: CPT | Performed by: OBSTETRICS & GYNECOLOGY

## 2024-10-08 PROCEDURE — 86593 SYPHILIS TEST NON-TREP QUANT: CPT | Performed by: OBSTETRICS & GYNECOLOGY

## 2024-10-08 PROCEDURE — 36415 COLL VENOUS BLD VENIPUNCTURE: CPT | Performed by: OBSTETRICS & GYNECOLOGY

## 2024-10-09 LAB
HAV IGM SERPL QL IA: NORMAL
HBV CORE IGM SERPL QL IA: NORMAL
HBV SURFACE AG SERPL QL IA: NORMAL
HCV AB SERPL QL IA: NORMAL
HIV 1+2 AB+HIV1 P24 AG SERPL QL IA: NORMAL

## 2024-10-10 ENCOUNTER — CLINICAL SUPPORT (OUTPATIENT)
Dept: REHABILITATION | Facility: HOSPITAL | Age: 49
End: 2024-10-10
Payer: COMMERCIAL

## 2024-10-10 ENCOUNTER — OFFICE VISIT (OUTPATIENT)
Dept: INTERNAL MEDICINE | Facility: CLINIC | Age: 49
End: 2024-10-10
Payer: COMMERCIAL

## 2024-10-10 VITALS
SYSTOLIC BLOOD PRESSURE: 108 MMHG | HEART RATE: 80 BPM | RESPIRATION RATE: 18 BRPM | WEIGHT: 244.25 LBS | BODY MASS INDEX: 43.28 KG/M2 | OXYGEN SATURATION: 96 % | HEIGHT: 63 IN | DIASTOLIC BLOOD PRESSURE: 70 MMHG | TEMPERATURE: 97 F

## 2024-10-10 DIAGNOSIS — Z12.11 SCREENING FOR COLON CANCER: ICD-10-CM

## 2024-10-10 DIAGNOSIS — R29.898 WEAKNESS OF BOTH LOWER EXTREMITIES: ICD-10-CM

## 2024-10-10 DIAGNOSIS — S82.401A TIBIA/FIBULA FRACTURE, RIGHT, CLOSED, INITIAL ENCOUNTER: Primary | ICD-10-CM

## 2024-10-10 DIAGNOSIS — I89.0 LYMPHEDEMA OF RIGHT LOWER EXTREMITY: ICD-10-CM

## 2024-10-10 DIAGNOSIS — G57.31 RIGHT PERONEAL NERVE PALSY: ICD-10-CM

## 2024-10-10 DIAGNOSIS — S82.201A TIBIA/FIBULA FRACTURE, RIGHT, CLOSED, INITIAL ENCOUNTER: Primary | ICD-10-CM

## 2024-10-10 DIAGNOSIS — E66.01 SEVERE OBESITY (BMI >= 40): ICD-10-CM

## 2024-10-10 DIAGNOSIS — D50.0 IRON DEFICIENCY ANEMIA DUE TO CHRONIC BLOOD LOSS: ICD-10-CM

## 2024-10-10 DIAGNOSIS — R26.2 DIFFICULTY IN WALKING INVOLVING ANKLE AND FOOT JOINT: ICD-10-CM

## 2024-10-10 DIAGNOSIS — M54.12 CERVICAL RADICULOPATHY: Primary | ICD-10-CM

## 2024-10-10 PROCEDURE — 3074F SYST BP LT 130 MM HG: CPT | Mod: CPTII,S$GLB,, | Performed by: FAMILY MEDICINE

## 2024-10-10 PROCEDURE — 99203 OFFICE O/P NEW LOW 30 MIN: CPT | Mod: S$GLB,,, | Performed by: FAMILY MEDICINE

## 2024-10-10 PROCEDURE — G2211 COMPLEX E/M VISIT ADD ON: HCPCS | Mod: S$GLB,,, | Performed by: FAMILY MEDICINE

## 2024-10-10 PROCEDURE — 3008F BODY MASS INDEX DOCD: CPT | Mod: CPTII,S$GLB,, | Performed by: FAMILY MEDICINE

## 2024-10-10 PROCEDURE — 99999 PR PBB SHADOW E&M-EST. PATIENT-LVL IV: CPT | Mod: PBBFAC,,, | Performed by: FAMILY MEDICINE

## 2024-10-10 PROCEDURE — 1159F MED LIST DOCD IN RCRD: CPT | Mod: CPTII,S$GLB,, | Performed by: FAMILY MEDICINE

## 2024-10-10 PROCEDURE — 97110 THERAPEUTIC EXERCISES: CPT | Performed by: PHYSICAL THERAPIST

## 2024-10-10 PROCEDURE — 3044F HG A1C LEVEL LT 7.0%: CPT | Mod: CPTII,S$GLB,, | Performed by: FAMILY MEDICINE

## 2024-10-10 PROCEDURE — 3078F DIAST BP <80 MM HG: CPT | Mod: CPTII,S$GLB,, | Performed by: FAMILY MEDICINE

## 2024-10-10 PROCEDURE — 97112 NEUROMUSCULAR REEDUCATION: CPT | Performed by: PHYSICAL THERAPIST

## 2024-10-10 NOTE — PROGRESS NOTES
[facility]   Physical Therapy Treatment Note        Name: Harrison Estevez  Clinic Number: [id number]    Therapy Diagnosis:   Encounter Diagnoses   Name Primary?    Tibia/fibula fracture, right, closed, initial encounter Yes    Right peroneal nerve palsy     Lymphedema of right lower extremity     Weakness of both lower extremities     Difficulty in walking involving ankle and foot joint      Physician: Brenda Henson DO    Visit Date: 10/10/2024    Physician Orders: PT Eval and Treat  Medical Diagnosis from Referral: Injury of right tibial nerve, subsequent encounter; Right peroneal nerve palsy, Lymphedema  Evaluation Date: 9/27/2024  Authorization Period Expiration: 12/31/24  Plan of Care Expiration: 12/27/2024  Progress Note Due: 10/27/2024  Visit # / Visits authorized: 1/20 (+1 for auth)   FOTO: 1/3 (last performed on 9/27/2024)     Precautions: Standard  PTA Visit #: 0/5     Time In: 9:40 AM  Time Out: 10:45 AM   Total Billable Time: 54 minutes (Billing reflects 1 on 1 treatment time spent with patient)    Subjective     Patient reports: She is doing well today. No issues after prior treatment sessions. She is off work today and did not wear her leg wrap.    He/She was compliant with home exercise program.  Response to previous treatment: LE Fatigue  Functional change: Improved ankle pain with walking today    Pain: NT/10     Location: right calf, foot     Objective      Objective Measures updated at progress report or POC update only unless otherwise noted.       Treatment     Harrison received the treatments listed below:       CPT Intervention Performed   Today Duration / Intensity   MT      TE Recumbent Bike x 8 minutes     Ankle 4 Way  x 30x each way RTB     Leg Press        Heel and toe Raises x 30x each          NMR NMES: Anterior Tib x 10 mins DF and Eversion     MOBO Board  x Odd and evens 2 min each     Toe Yoga x 3 mins     Sidelying hip abduction x 3x10 each side      "Bridges x  3x10    TA Lateral Band walks - 3 laps at the mat     Box Squats x 24" x10  (increased time for rest breaks and form cueing)     Sled push Focus on full Heel to toe push off                                    PLAN  SLR with PPT, hip adduction, prone hip extension, quadruped hydrants/donkey kicks.          CPT Codes available for Billing:   (00) minutes of Manual therapy (MT) to improve pain and ROM.  (23) minutes of Therapeutic Exercise (TE) to develop strength, endurance, range of motion, and flexibility.  (26) minutes of Neuromuscular Re-Education (NMR)  to improve: Balance, Coordination, Kinesthetic, Sense, Proprioception, and Posture.  (5) minutes of Therapeutic Activities (TA) to improve functional performance.  Vasopneumatic Device Therapy () for management of swelling/edema. (78884)  Unattended Electrical Stimulation (ES) for muscle performance or pain modulation.  BFR: Blood flow restriction applied during exercise      Patient Education and Home Exercises       Home Exercises Provided and Patient Education Provided     Education provided: (Included in treatment time) minutes  PURPOSE: Patient educated on the impairments noted above and the effects of physical therapy intervention to improve overall condition and QOL.   EXERCISE: Patient was educated on all the above exercise prior/during/after for proper posture, positioning, and execution for safe performance with home exercise program.   STRENGTH: Patient educated on the importance of improved core and extremity strength in order to improve alignment of the spine and extremities with static positions and dynamic movement.   GAIT & BALANCE: Patient educated on the importance of strong core and lower extremity musculature in order to improve both static and dynamic balance, improve gait mechanics, reduce fall risk and improve household and community mobility.   TRANSFERS & TRANSITIONS: Patient educated on proper technique for bed mobility, " transitions and transfers to improve body mechanics and decrease risk of injury.     Written Home Exercises Provided: patient encouraged to participate with prior issued home exercise program.  Exercises were reviewed and Harrison was able to demonstrate them prior to the end of the session.  Harrison demonstrated good  understanding of the education provided. See EMR under Patient Instructions for exercises provided during therapy sessions.    Assessment     Patient tolerated all treatment well and was given cues as needed during treatment session for correct technique. Encouraged home exercise program. Noted increased DF with NMES today.    Harrison is progressing well towards her goals.   Patient prognosis is Fair. if patient is consistent and compliant with Physical Therapy and home exercise program.     Patient will continue to benefit from skilled outpatient physical therapy to address the deficits listed in the problem list box on initial evaluation, provide pt/family education and to maximize patient's level of independence in the home and community environment.     Patient's spiritual, cultural and educational needs considered and pt agreeable to plan of care and goals.     Anticipated Barriers for therapy: co-morbidities, sedentary lifestyle, chronicity of condition, lack of understanding of condition, adherence to treatment plan, financial limitations, insurance coverage, occupation, and coping style       Goals:  Reviewed: 9/27/2024       Short Term Goals: In 6 weeks  Progress Date   1.Patient to be educated on HEP. [x] Progressing  [] MET   [] Not MET   [] Not tested     2.Patient to increase ankle range of motion, in order to improve available range of motion for ADL's.  [x] Progressing  [] MET   [] Not MET  [] Not tested     3.Patient to increase bilateral LE strength by 1/2 grade, in order to improve endurance and increase ability to perform all functional activities for increased time.  [x]  Progressing  [] MET   [] Not MET  [] Not tested     4.Patient to have pain less than 2/10 at worst, to improve QOL. [x] Progressing  [] MET   [] Not MET  [] Not tested     5.Patient to improve score on the FOTO, to improve QOL. [x] Progressing  [] MET   [] Not MET  [] Not tested     6. Patient to improve score on 5 times sit to  order to decrease fall risk. [x] Progressing  [] MET   [] Not MET  [] Not tested        Long Term Goals: In 12 weeks Progress Date   1.Patient to improve score on the FOTO predicted score or better, to improve QOL. [x] Progressing  [] MET   [] Not MET  [] Not tested     2. Patient to increase bilateral LE strength to 4/5 or greater, in order to improve endurance and increase ability to perform all functional activities for increased time. [x] Progressing  [] MET   [] Not MET  [] Not tested     3. Patient to have decreased pain to 0-1/10 at worst, to improve QOL. [x] Progressing  [] MET   [] Not MET  [] Not tested     4. Patient to normalize score on 5 times sit to stand, in order to improve endurance and decrease fall risk. [x] Progressing  [] MET   [] Not MET  [] Not tested     5. Patient to perform daily activities including prolonged walking over 30 -45 min with only mild increased symptoms. [x] Progressing  [] MET   [] Not MET  [] Not tested        Plan     Continue Plan of Care (POC) and progress per patient tolerance. See treatment section for details on planned progressions next session.      Jaz Ahmadi, PT

## 2024-10-15 ENCOUNTER — HOSPITAL ENCOUNTER (OUTPATIENT)
Dept: RADIOLOGY | Facility: HOSPITAL | Age: 49
Discharge: HOME | End: 2024-10-15
Attending: FAMILY MEDICINE
Payer: COMMERCIAL

## 2024-10-15 ENCOUNTER — CLINICAL SUPPORT (OUTPATIENT)
Dept: REHABILITATION | Facility: HOSPITAL | Age: 49
End: 2024-10-15
Payer: COMMERCIAL

## 2024-10-15 DIAGNOSIS — M54.12 CERVICAL RADICULOPATHY: ICD-10-CM

## 2024-10-15 DIAGNOSIS — G57.31 RIGHT PERONEAL NERVE PALSY: ICD-10-CM

## 2024-10-15 DIAGNOSIS — R29.898 WEAKNESS OF BOTH LOWER EXTREMITIES: ICD-10-CM

## 2024-10-15 DIAGNOSIS — S82.401A TIBIA/FIBULA FRACTURE, RIGHT, CLOSED, INITIAL ENCOUNTER: Primary | ICD-10-CM

## 2024-10-15 DIAGNOSIS — I89.0 LYMPHEDEMA OF RIGHT LOWER EXTREMITY: ICD-10-CM

## 2024-10-15 DIAGNOSIS — R26.2 DIFFICULTY IN WALKING INVOLVING ANKLE AND FOOT JOINT: ICD-10-CM

## 2024-10-15 DIAGNOSIS — S82.201A TIBIA/FIBULA FRACTURE, RIGHT, CLOSED, INITIAL ENCOUNTER: Primary | ICD-10-CM

## 2024-10-15 PROCEDURE — 70360 X-RAY EXAM OF NECK: CPT | Mod: 26,,, | Performed by: STUDENT IN AN ORGANIZED HEALTH CARE EDUCATION/TRAINING PROGRAM

## 2024-10-15 PROCEDURE — 97110 THERAPEUTIC EXERCISES: CPT

## 2024-10-15 PROCEDURE — 70360 X-RAY EXAM OF NECK: CPT | Mod: TC

## 2024-10-15 PROCEDURE — 97112 NEUROMUSCULAR REEDUCATION: CPT

## 2024-10-15 NOTE — PROGRESS NOTES
[facility]   Physical Therapy Treatment Note        Name: Harrison Estevez  [facility] Number: [id number]    Therapy Diagnosis:   Encounter Diagnoses   Name Primary?    Tibia/fibula fracture, right, closed, initial encounter Yes    Right peroneal nerve palsy     Lymphedema of right lower extremity     Weakness of both lower extremities     Difficulty in walking involving ankle and foot joint      Physician: Brenda Henson DO    Visit Date: 10/15/2024    Physician Orders: PT Eval and Treat  Medical Diagnosis from Referral: Injury of right tibial nerve, subsequent encounter; Right peroneal nerve palsy, Lymphedema  Evaluation Date: 9/27/2024  Authorization Period Expiration: 12/31/24  Plan of Care Expiration: 12/27/2024  Progress Note Due: 10/27/2024  Visit # / Visits authorized: 4/20 (+1 for auth)   FOTO: 1/3 (last performed on 9/27/2024)     Precautions: Standard  PTA Visit #: 0/5     Time In: 9:40 AM  Time Out: 10:45 AM   Total Billable Time: 54 minutes (Billing reflects 1 on 1 treatment time spent with patient)    Subjective     Patient reports: She is feeling okay, but she did a lot of walking up and down hill to go fishing with her family this weekend so she is pretty sore.     He/She was compliant with home exercise program.  Response to previous treatment: LE Fatigue  Functional change: Improved ankle pain with walking today    Pain: NT/10     Location: right calf, foot     Objective      Objective Measures updated at progress report or POC update only unless otherwise noted.       Treatment     Harrison received the treatments listed below:       CPT Intervention Performed   Today Duration / Intensity   MT      TE Recumbent Bike x 8 minutes     Ankle 4 Way  x 30x each way RTB     Leg Press        Heel and toe Raises x 30x each          NMR NMES: Anterior Tib x 10 mins DF and Eversion     MOBO Board  x Odd and evens 2 min each     Toe Yoga x 3 mins     Sidelying hip abduction x 3x10 each  "side     Bridges x  3x10    TA Lateral Band walks - 3 laps at the mat     Box Squats x 24" x10  (increased time for rest breaks and form cueing)     Sled push Focus on full Heel to toe push off                                    PLAN  SLR with PPT, hip adduction, prone hip extension, quadruped hydrants/donkey kicks.          CPT Codes available for Billing:   (00) minutes of Manual therapy (MT) to improve pain and ROM.  (23) minutes of Therapeutic Exercise (TE) to develop strength, endurance, range of motion, and flexibility.  (26) minutes of Neuromuscular Re-Education (NMR)  to improve: Balance, Coordination, Kinesthetic, Sense, Proprioception, and Posture.  (5) minutes of Therapeutic Activities (TA) to improve functional performance.  Vasopneumatic Device Therapy () for management of swelling/edema. ([id number])  Unattended Electrical Stimulation (ES) for muscle performance or pain modulation.  BFR: Blood flow restriction applied during exercise      Patient Education and Home Exercises       Home Exercises Provided and Patient Education Provided     Education provided: (Included in treatment time) minutes  PURPOSE: Patient educated on the impairments noted above and the effects of physical therapy intervention to improve overall condition and QOL.   EXERCISE: Patient was educated on all the above exercise prior/during/after for proper posture, positioning, and execution for safe performance with home exercise program.   STRENGTH: Patient educated on the importance of improved core and extremity strength in order to improve alignment of the spine and extremities with static positions and dynamic movement.   GAIT & BALANCE: Patient educated on the importance of strong core and lower extremity musculature in order to improve both static and dynamic balance, improve gait mechanics, reduce fall risk and improve household and community mobility.   TRANSFERS & TRANSITIONS: Patient educated on proper technique for bed " mobility, transitions and transfers to improve body mechanics and decrease risk of injury.     Written Home Exercises Provided: patient encouraged to participate with prior issued home exercise program.  Exercises were reviewed and Harrison was able to demonstrate them prior to the end of the session.  Harrison demonstrated good  understanding of the education provided. See EMR under Patient Instructions for exercises provided during therapy sessions.    Assessment     Patient tolerated all treatment well and was given cues as needed during treatment session for correct technique.She has been progressing well and has notable increase in DF tolerance. She is getting set up for better lymphedema management soon, this should help with some of her swelling.     Harrison is progressing well towards her goals.   Patient prognosis is Fair. if patient is consistent and compliant with Physical Therapy and home exercise program.     Patient will continue to benefit from skilled outpatient physical therapy to address the deficits listed in the problem list box on initial evaluation, provide pt/family education and to maximize patient's level of independence in the home and community environment.     Patient's spiritual, cultural and educational needs considered and pt agreeable to plan of care and goals.     Anticipated Barriers for therapy: co-morbidities, sedentary lifestyle, chronicity of condition, lack of understanding of condition, adherence to treatment plan, financial limitations, insurance coverage, occupation, and coping style       Goals:  Reviewed: 9/27/2024       Short Term Goals: In 6 weeks  Progress Date   1.Patient to be educated on HEP. [x] Progressing  [] MET   [] Not MET   [] Not tested     2.Patient to increase ankle range of motion, in order to improve available range of motion for ADL's.  [x] Progressing  [] MET   [] Not MET  [] Not tested     3.Patient to increase bilateral LE strength by 1/2 grade, in order  to improve endurance and increase ability to perform all functional activities for increased time.  [x] Progressing  [] MET   [] Not MET  [] Not tested     4.Patient to have pain less than 2/10 at worst, to improve QOL. [x] Progressing  [] MET   [] Not MET  [] Not tested     5.Patient to improve score on the FOTO, to improve QOL. [x] Progressing  [] MET   [] Not MET  [] Not tested     6. Patient to improve score on 5 times sit to  order to decrease fall risk. [x] Progressing  [] MET   [] Not MET  [] Not tested        Long Term Goals: In 12 weeks Progress Date   1.Patient to improve score on the FOTO predicted score or better, to improve QOL. [x] Progressing  [] MET   [] Not MET  [] Not tested     2. Patient to increase bilateral LE strength to 4/5 or greater, in order to improve endurance and increase ability to perform all functional activities for increased time. [x] Progressing  [] MET   [] Not MET  [] Not tested     3. Patient to have decreased pain to 0-1/10 at worst, to improve QOL. [x] Progressing  [] MET   [] Not MET  [] Not tested     4. Patient to normalize score on 5 times sit to stand, in order to improve endurance and decrease fall risk. [x] Progressing  [] MET   [] Not MET  [] Not tested     5. Patient to perform daily activities including prolonged walking over 30 -45 min with only mild increased symptoms. [x] Progressing  [] MET   [] Not MET  [] Not tested        Plan     Continue Plan of Care (POC) and progress per patient tolerance. See treatment section for details on planned progressions next session.      Joe Abebe, PT

## 2024-10-17 ENCOUNTER — HOSPITAL ENCOUNTER (OUTPATIENT)
Dept: PREADMISSION TESTING | Facility: HOSPITAL | Age: 49
Discharge: HOME | End: 2024-10-17
Attending: INTERNAL MEDICINE
Payer: COMMERCIAL

## 2024-10-17 ENCOUNTER — PATIENT MESSAGE (OUTPATIENT)
Dept: INTERNAL MEDICINE | Facility: CLINIC | Age: 49
End: 2024-10-17
Payer: COMMERCIAL

## 2024-10-17 ENCOUNTER — CLINICAL SUPPORT (OUTPATIENT)
Dept: REHABILITATION | Facility: HOSPITAL | Age: 49
End: 2024-10-17
Payer: COMMERCIAL

## 2024-10-17 DIAGNOSIS — I89.0 LYMPHEDEMA OF RIGHT LOWER EXTREMITY: ICD-10-CM

## 2024-10-17 DIAGNOSIS — R29.898 WEAKNESS OF BOTH LOWER EXTREMITIES: ICD-10-CM

## 2024-10-17 DIAGNOSIS — G57.31 RIGHT PERONEAL NERVE PALSY: ICD-10-CM

## 2024-10-17 DIAGNOSIS — S82.401A TIBIA/FIBULA FRACTURE, RIGHT, CLOSED, INITIAL ENCOUNTER: Primary | ICD-10-CM

## 2024-10-17 DIAGNOSIS — R26.2 DIFFICULTY IN WALKING INVOLVING ANKLE AND FOOT JOINT: ICD-10-CM

## 2024-10-17 DIAGNOSIS — S82.201A TIBIA/FIBULA FRACTURE, RIGHT, CLOSED, INITIAL ENCOUNTER: Primary | ICD-10-CM

## 2024-10-17 DIAGNOSIS — Z12.11 SCREENING FOR COLON CANCER: ICD-10-CM

## 2024-10-17 PROCEDURE — 97112 NEUROMUSCULAR REEDUCATION: CPT

## 2024-10-17 PROCEDURE — 97110 THERAPEUTIC EXERCISES: CPT

## 2024-10-17 NOTE — PROGRESS NOTES
[facility]   Physical Therapy Treatment Note        Name: Harrison Estevez  [facility] Number: [id number]    Therapy Diagnosis:   Encounter Diagnoses   Name Primary?    Tibia/fibula fracture, right, closed, initial encounter Yes    Right peroneal nerve palsy     Lymphedema of right lower extremity     Weakness of both lower extremities     Difficulty in walking involving ankle and foot joint      Physician: Brenda Henson DO    Visit Date: 10/17/2024    Physician Orders: PT Eval and Treat  Medical Diagnosis from Referral: Injury of right tibial nerve, subsequent encounter; Right peroneal nerve palsy, Lymphedema  Evaluation Date: 9/27/2024  Authorization Period Expiration: 12/31/24  Plan of Care Expiration: 12/27/2024  Progress Note Due: 10/27/2024  Visit # / Visits authorized: 5/20 (+1 for auth)   FOTO: 1/3 (last performed on 9/27/2024)     Precautions: Standard  PTA Visit #: 0/5     Time In: 3:40 PM  Time Out: 4:30 PM   Total Billable Time: 50 minutes (Billing reflects 1 on 1 treatment time spent with patient)    Subjective     Patient reports: She is feeling okay today she is having some slight pain in her ankle but nothing too major    He/She was compliant with home exercise program.  Response to previous treatment: LE Fatigue  Functional change: Improved ankle pain with walking today    Pain:4-5/10     Location: right calf, foot     Objective      Objective Measures updated at progress report or POC update only unless otherwise noted.       Treatment     Harrison received the treatments listed below:       CPT Intervention Performed   Today Duration / Intensity   MT      TE Recumbent Bike x 8 minutes     Ankle 4 Way  x 30x each way RTB     Leg Press   -     Heel and toe Raises x 30x each          NMR NMES: Anterior Tib x 10 mins DF and Eversion     MOBO Board- Standing  x Odd and evens 2 min each     Toe Yoga- Standing x 3 mins     Short Foot - Standing x 3 mins     Sidelying hip  "abduction x 3x10 each side     Bridges x  3x10    TA Lateral Band walks - 3 laps at the mat     Box Squats x 24" x10  (increased time for rest breaks and form cueing)                                        PLAN           CPT Codes available for Billing:   (00) minutes of Manual therapy (MT) to improve pain and ROM.  (23) minutes of Therapeutic Exercise (TE) to develop strength, endurance, range of motion, and flexibility.  (26) minutes of Neuromuscular Re-Education (NMR)  to improve: Balance, Coordination, Kinesthetic, Sense, Proprioception, and Posture.  (5) minutes of Therapeutic Activities (TA) to improve functional performance.  Vasopneumatic Device Therapy () for management of swelling/edema. (85367)  Unattended Electrical Stimulation (ES) for muscle performance or pain modulation.  BFR: Blood flow restriction applied during exercise      Patient Education and Home Exercises       Home Exercises Provided and Patient Education Provided     Education provided: (Included in treatment time) minutes  PURPOSE: Patient educated on the impairments noted above and the effects of physical therapy intervention to improve overall condition and QOL.   EXERCISE: Patient was educated on all the above exercise prior/during/after for proper posture, positioning, and execution for safe performance with home exercise program.   STRENGTH: Patient educated on the importance of improved core and extremity strength in order to improve alignment of the spine and extremities with static positions and dynamic movement.   GAIT & BALANCE: Patient educated on the importance of strong core and lower extremity musculature in order to improve both static and dynamic balance, improve gait mechanics, reduce fall risk and improve household and community mobility.   TRANSFERS & TRANSITIONS: Patient educated on proper technique for bed mobility, transitions and transfers to improve body mechanics and decrease risk of injury.     Written Home " Exercises Provided: patient encouraged to participate with prior issued home exercise program.  Exercises were reviewed and Harrison was able to demonstrate them prior to the end of the session.  Harrison demonstrated good  understanding of the education provided. See EMR under Patient Instructions for exercises provided during therapy sessions.    Assessment     Patient tolerated all treatment well, we progressed her foot intrinsic exercises to a standing version to work on building weight bearing tolerance. She had some discomfort with standing MOBO board today but was able to find a pain free range to work through.   Harrison is progressing well towards her goals.   Patient prognosis is Fair. if patient is consistent and compliant with Physical Therapy and home exercise program.     Patient will continue to benefit from skilled outpatient physical therapy to address the deficits listed in the problem list box on initial evaluation, provide pt/family education and to maximize patient's level of independence in the home and community environment.     Patient's spiritual, cultural and educational needs considered and pt agreeable to plan of care and goals.     Anticipated Barriers for therapy: co-morbidities, sedentary lifestyle, chronicity of condition, lack of understanding of condition, adherence to treatment plan, financial limitations, insurance coverage, occupation, and coping style       Goals:  Reviewed: 9/27/2024       Short Term Goals: In 6 weeks  Progress Date   1.Patient to be educated on HEP. [x] Progressing  [] MET   [] Not MET   [] Not tested     2.Patient to increase ankle range of motion, in order to improve available range of motion for ADL's.  [x] Progressing  [] MET   [] Not MET  [] Not tested     3.Patient to increase bilateral LE strength by 1/2 grade, in order to improve endurance and increase ability to perform all functional activities for increased time.  [x] Progressing  [] MET   [] Not MET  []  Not tested     4.Patient to have pain less than 2/10 at worst, to improve QOL. [x] Progressing  [] MET   [] Not MET  [] Not tested     5.Patient to improve score on the FOTO, to improve QOL. [x] Progressing  [] MET   [] Not MET  [] Not tested     6. Patient to improve score on 5 times sit to  order to decrease fall risk. [x] Progressing  [] MET   [] Not MET  [] Not tested        Long Term Goals: In 12 weeks Progress Date   1.Patient to improve score on the FOTO predicted score or better, to improve QOL. [x] Progressing  [] MET   [] Not MET  [] Not tested     2. Patient to increase bilateral LE strength to 4/5 or greater, in order to improve endurance and increase ability to perform all functional activities for increased time. [x] Progressing  [] MET   [] Not MET  [] Not tested     3. Patient to have decreased pain to 0-1/10 at worst, to improve QOL. [x] Progressing  [] MET   [] Not MET  [] Not tested     4. Patient to normalize score on 5 times sit to stand, in order to improve endurance and decrease fall risk. [x] Progressing  [] MET   [] Not MET  [] Not tested     5. Patient to perform daily activities including prolonged walking over 30 -45 min with only mild increased symptoms. [x] Progressing  [] MET   [] Not MET  [] Not tested        Plan     Continue Plan of Care (POC) and progress per patient tolerance. See treatment section for details on planned progressions next session.      Joe Abebe, PT

## 2024-10-21 ENCOUNTER — OFFICE VISIT (OUTPATIENT)
Dept: INTERNAL MEDICINE | Facility: CLINIC | Age: 49
End: 2024-10-21
Payer: COMMERCIAL

## 2024-10-21 ENCOUNTER — PATIENT MESSAGE (OUTPATIENT)
Dept: ORTHOPEDICS | Facility: CLINIC | Age: 49
End: 2024-10-21
Payer: COMMERCIAL

## 2024-10-21 ENCOUNTER — PATIENT MESSAGE (OUTPATIENT)
Dept: OBSTETRICS AND GYNECOLOGY | Facility: CLINIC | Age: 49
End: 2024-10-21
Payer: COMMERCIAL

## 2024-10-21 DIAGNOSIS — D50.0 IRON DEFICIENCY ANEMIA DUE TO CHRONIC BLOOD LOSS: Primary | ICD-10-CM

## 2024-10-21 PROCEDURE — G2211 COMPLEX E/M VISIT ADD ON: HCPCS | Mod: 95,,, | Performed by: FAMILY MEDICINE

## 2024-10-21 PROCEDURE — 3044F HG A1C LEVEL LT 7.0%: CPT | Mod: CPTII,95,, | Performed by: FAMILY MEDICINE

## 2024-10-21 PROCEDURE — 99204 OFFICE O/P NEW MOD 45 MIN: CPT | Mod: 95,,, | Performed by: FAMILY MEDICINE

## 2024-10-21 RX ORDER — FERROUS SULFATE 325(65) MG
325 TABLET ORAL DAILY
Qty: 90 TABLET | Refills: 3 | Status: SHIPPED | OUTPATIENT
Start: 2024-10-21

## 2024-10-21 RX ORDER — DOCUSATE SODIUM 100 MG/1
100 CAPSULE, LIQUID FILLED ORAL 2 TIMES DAILY
Qty: 90 CAPSULE | Refills: 3 | Status: SHIPPED | OUTPATIENT
Start: 2024-10-21

## 2024-10-21 NOTE — PROGRESS NOTES
The patient location is: [place]  The chief complaint leading to consultation is: follow up/question about results  botfly  Visit type: audiovisual    Face to Face time with patient: 11 minutes  20 minutes of total time spent on the encounter, which includes face to face time and non-face to face time preparing to see the patient (eg, review of tests), Obtaining and/or reviewing separately obtained history, Documenting clinical information in the electronic or other health record, Independently interpreting results (not separately reported) and communicating results to the patient/family/caregiver, or Care coordination (not separately reported).     Each patient to whom he or she provides medical services by telemedicine is:  (1) informed of the relationship between the physician and patient and the respective role of any other health care provider with respect to management of the patient; and (2) notified that he or she may decline to receive medical services by telemedicine and may withdraw from such care at any time.    Notes:    Subjective:       Patient ID: Harrison Estevez is a 49 y.o. female.    Chief Complaint: Results    Harrison Estevez is a 49 y.o. female who presents to clinic for results. She is doing well today. Questions answered regarding results, requests prescription for oral iron supplementation to be sent to her pharmacy. She went camping with her daughter and had issues with multiple botfly bites but symptoms improve her daughter had a more intense response to the insect bites, [facility], has plans to schedule an appointment for her daughter.       Review of Systems   Constitutional:  Positive for unexpected weight change. Negative for activity change.   HENT:  Negative for hearing loss, rhinorrhea and trouble swallowing.    Eyes:  Negative for discharge and visual disturbance.   Respiratory:  Negative for chest tightness and wheezing.    Cardiovascular:  Negative for  chest pain and palpitations.   Gastrointestinal:  Negative for blood in stool, constipation, diarrhea and vomiting.   Endocrine: Negative for polydipsia and polyuria.   Genitourinary:  Negative for difficulty urinating, dysuria, hematuria and menstrual problem.   Musculoskeletal:  Negative for arthralgias, joint swelling and neck pain.   Neurological:  Negative for weakness and headaches.   Psychiatric/Behavioral:  Negative for confusion and dysphoric mood.        Objective:      Physical Exam  Constitutional:       General: She is not in acute distress.  HENT:      Head: Normocephalic and atraumatic.   Eyes:      General: No scleral icterus.        Right eye: No discharge.         Left eye: No discharge.   Pulmonary:      Effort: Pulmonary effort is normal.   Neurological:      Mental Status: She is alert and oriented to person, place, and time.   Psychiatric:         Mood and Affect: Mood normal.         Behavior: Behavior normal.         Assessment:       1. Iron deficiency anemia due to chronic blood loss        Plan:   1. Iron deficiency anemia due to chronic blood loss  Chronic, uncontrolled, recommend starting iron supplementation, stool softener prescribed due to potential for constipation with supplementation, advised to increase iron rich foods in diet as well   -     ferrous sulfate (FEOSOL) 325 mg (65 mg iron) Tab tablet; Take 1 tablet (325 mg total) by mouth once daily.  Dispense: 90 tablet; Refill: 3  -     docusate sodium (COLACE) 100 MG capsule; Take 1 capsule (100 mg total) by mouth 2 (two) times daily.  Dispense: 90 capsule; Refill: 3       RTC in 3 months as scheduled below  Future Appointments   Date Time Provider Department Center   10/28/2024  5:00 PM Nely Dorman, PT [facility] [place]   10/30/2024  4:30 PM Jaz Ahmadi, PT [facility] [place]   11/5/2024  3:30 PM Joe Abebe, PT [facility] [place]   11/7/2024  3:30 PM Joe Abebe, PT [facility] [place]    11/11/2024  9:40 AM Brenda Henson DO [facility]   11/11/2024 10:30 AM Alberta Vázquez PA-C [facility]   11/12/2024  3:30 PM Allbritton, Joe, PT [facility] [place]   11/14/2024  3:30 PM Allbritton, Joe, PT [facility] [place]   11/18/2024  1:00 PM BHUMI Shine MD [facility] [place]   11/18/2024  1:20 PM LABORATORY, [facility] [place]   11/19/2024  3:30 PM Allbritton, Joe, PT [facility] [place]   11/21/2024  3:30 PM Allbritton, Joe, PT [facility] [place]   11/26/2024  3:30 PM Allbritton, Joe, PT [facility] [place]   1/13/2025  2:20 PM Kym Sparrow MD [facility] Federico Sparrow MD  [facility] [address]  [place] [address]  [phone]

## 2024-10-27 PROBLEM — D50.9 MICROCYTIC ANEMIA: Status: RESOLVED | Noted: 2024-10-27 | Resolved: 2024-10-27

## 2024-10-27 PROBLEM — E66.01 SEVERE OBESITY (BMI >= 40): Status: ACTIVE | Noted: 2024-10-27

## 2024-10-27 PROBLEM — D50.9 MICROCYTIC ANEMIA: Status: ACTIVE | Noted: 2024-10-27

## 2024-10-27 PROBLEM — M54.12 CERVICAL RADICULOPATHY: Status: ACTIVE | Noted: 2024-10-27

## 2024-11-05 ENCOUNTER — DOCUMENTATION ONLY (OUTPATIENT)
Dept: REHABILITATION | Facility: HOSPITAL | Age: 49
End: 2024-11-05
Payer: COMMERCIAL

## 2024-11-05 NOTE — PROGRESS NOTES
[facility]  Physical Therapy Discharge Note    Name: Harrison Estevez  Clinic Number: [id number]    Therapy Diagnosis: No diagnosis found.  Physician: No ref. provider found    Physician Orders: PT Eval and Treat  Medical Diagnosis from Referral: Injury of right tibial nerve, subsequent encounter; Right peroneal nerve palsy, Lymphedema  Evaluation Date: 9/27/2024      Date of Last visit: 11/5/2024   Total Visits Received: 5    ASSESSMENT      Mrs. Terry has decided to discharge from PT due to feeling she is not making progress. I do suggest she does continue to seek out a referral to our OT for management of her lymphedema despite her lack of progression of symptoms with PT treatment.     Discharge reason: Patient requested discharge as she felt she was not progressing with therapy, and the exercises we were doing at this clinic were similar to the ones at the last clinic. She feels she was not making progress with the exercises and plans to follow back up with MD to see about any other options she may suggest.       Discharge FOTO Score: see daily note    Goals: see daily note    PLAN   This patient is discharged from Physical Therapy      Joe Abebe PT

## 2024-11-11 ENCOUNTER — OFFICE VISIT (OUTPATIENT)
Dept: SURGERY | Facility: CLINIC | Age: 49
End: 2024-11-11
Payer: COMMERCIAL

## 2024-11-11 ENCOUNTER — OFFICE VISIT (OUTPATIENT)
Dept: ORTHOPEDICS | Facility: CLINIC | Age: 49
End: 2024-11-11
Payer: COMMERCIAL

## 2024-11-11 VITALS
DIASTOLIC BLOOD PRESSURE: 85 MMHG | HEART RATE: 70 BPM | WEIGHT: 244.25 LBS | BODY MASS INDEX: 43.28 KG/M2 | SYSTOLIC BLOOD PRESSURE: 138 MMHG | HEIGHT: 63 IN

## 2024-11-11 VITALS
DIASTOLIC BLOOD PRESSURE: 85 MMHG | HEART RATE: 66 BPM | HEIGHT: 63 IN | WEIGHT: 247.81 LBS | BODY MASS INDEX: 43.91 KG/M2 | SYSTOLIC BLOOD PRESSURE: 133 MMHG

## 2024-11-11 DIAGNOSIS — G57.31 RIGHT PERONEAL NERVE PALSY: ICD-10-CM

## 2024-11-11 DIAGNOSIS — S84.01XD: ICD-10-CM

## 2024-11-11 DIAGNOSIS — S82.261D CLOSED DISPLACED SEGMENTAL FRACTURE OF SHAFT OF RIGHT TIBIA WITH ROUTINE HEALING, SUBSEQUENT ENCOUNTER: Primary | ICD-10-CM

## 2024-11-11 DIAGNOSIS — M79.671 RIGHT FOOT PAIN: ICD-10-CM

## 2024-11-11 DIAGNOSIS — R22.41 NODULE OF SKIN OF RIGHT LOWER LEG: Primary | ICD-10-CM

## 2024-11-11 DIAGNOSIS — M89.8X6 TIBIAL PAIN: Primary | ICD-10-CM

## 2024-11-11 PROCEDURE — 3044F HG A1C LEVEL LT 7.0%: CPT | Mod: CPTII,S$GLB,, | Performed by: PHYSICIAN ASSISTANT

## 2024-11-11 PROCEDURE — 3079F DIAST BP 80-89 MM HG: CPT | Mod: CPTII,S$GLB,,

## 2024-11-11 PROCEDURE — 1159F MED LIST DOCD IN RCRD: CPT | Mod: CPTII,S$GLB,,

## 2024-11-11 PROCEDURE — 3079F DIAST BP 80-89 MM HG: CPT | Mod: CPTII,S$GLB,, | Performed by: PHYSICIAN ASSISTANT

## 2024-11-11 PROCEDURE — 3075F SYST BP GE 130 - 139MM HG: CPT | Mod: CPTII,S$GLB,,

## 2024-11-11 PROCEDURE — 3075F SYST BP GE 130 - 139MM HG: CPT | Mod: CPTII,S$GLB,, | Performed by: PHYSICIAN ASSISTANT

## 2024-11-11 PROCEDURE — 99214 OFFICE O/P EST MOD 30 MIN: CPT | Mod: S$GLB,,, | Performed by: PHYSICIAN ASSISTANT

## 2024-11-11 PROCEDURE — 99999 PR PBB SHADOW E&M-EST. PATIENT-LVL IV: CPT | Mod: PBBFAC,,, | Performed by: PHYSICIAN ASSISTANT

## 2024-11-11 PROCEDURE — 99203 OFFICE O/P NEW LOW 30 MIN: CPT | Mod: S$GLB,,,

## 2024-11-11 PROCEDURE — 3008F BODY MASS INDEX DOCD: CPT | Mod: CPTII,S$GLB,,

## 2024-11-11 PROCEDURE — 1160F RVW MEDS BY RX/DR IN RCRD: CPT | Mod: CPTII,S$GLB,,

## 2024-11-11 PROCEDURE — 99999 PR PBB SHADOW E&M-EST. PATIENT-LVL III: CPT | Mod: PBBFAC,,,

## 2024-11-11 PROCEDURE — 3044F HG A1C LEVEL LT 7.0%: CPT | Mod: CPTII,S$GLB,,

## 2024-11-11 PROCEDURE — 3008F BODY MASS INDEX DOCD: CPT | Mod: CPTII,S$GLB,, | Performed by: PHYSICIAN ASSISTANT
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
## 2024-11-11 NOTE — PROGRESS NOTES
Subjective:      Patient ID: Harrison Estevez is a 49 y.o. female.    History of Present Illness    CHIEF COMPLAINT:  - Harrison presents today for a postoperative follow-up, 10 months status post operative repair of right tibia with intramedullary nail.    HPI:  Harrison presents for a postoperative follow-up, 10 months status post operative repair of right tibia with intramedullary nail. She was last seen in the clinic about 6 weeks ago by Dr. Henson. She has had a right peroneal nerve palsy since the injury and a significant amount of swelling. She reports continued limping and discontinued physical therapy due to lack of progress, stating that the exercises remained the same as those from six months ago without improvement in gait.    She describes limitations in activities, including difficulty with long-distance walking and avoiding crowds. She experiences pain in the right foot, particularly in the toe, which increases with ambulation. She reports pain extending from the foot up the leg.    She also experiences numbness and tingling in the foot area at night when resting. These symptoms, particularly the foot pain, have been present for at least the last two visits to the clinic. The swelling has significantly decreased compared to previous visits.    Harrison denies any previous problems with the right foot prior to the injury.    SURGICAL HISTORY:  - Repair of right tibia with intramedullary nail: Approximately 10 months ago        Past Medical History:   Diagnosis Date    Abnormal Pap smear 2011    Cryosurgery done    General anesthetics causing adverse effect in therapeutic use     slow to wake up following     Microcytic anemia 10/27/2024     Current Outpatient Medications:     docusate sodium (COLACE) 100 MG capsule, Take 1 capsule (100 mg total) by mouth 2 (two) times daily., Disp: 90 capsule, Rfl: 3    estradioL (ESTRACE) 0.01 % (0.1 mg/gram) vaginal cream, Place 1 g vaginally 3 (three) times  "a week., Disp: 42.5 g, Rfl: 12    ferrous sulfate (FEOSOL) 325 mg (65 mg iron) Tab tablet, Take 1 tablet (325 mg total) by mouth once daily., Disp: 90 tablet, Rfl: 3    gabapentin (NEURONTIN) 600 MG tablet, Take 1 tablet (600 mg total) by mouth once daily. (Patient taking differently: Take 600 mg by mouth once daily. As needed), Disp: 30 tablet, Rfl: 2    naproxen (NAPROSYN) 500 MG tablet, TAKE 1 TABLET BY MOUTH TWICE DAILY (Patient taking differently: Take 500 mg by mouth 2 (two) times daily. As needed), Disp: 60 tablet, Rfl: 1    Review of patient's allergies indicates:   Allergen Reactions    Penicillanic sulfone bl beta-lactamase inhibitors Hives    Penicillins        /85 (BP Location: Left arm, Patient Position: Sitting)   Pulse 70   Ht 5' 3" (1.6 m)   Wt 110.8 kg (244 lb 4.3 oz)   LMP 04/12/2023   BMI 43.27 kg/m²       Objective:    Ortho Exam   Right lower extremity:   Incisions are well healed, no signs of infection   Mild edema diffusely  Minimal TTP   ROM decreased secondary to swelling and stiffness   Calf and compartments are soft and compressible   Plantarflexion/dorsiflexion of the foot is decreased, but improving   Sensation intact   Pulses intact   Cap refill brisk    GEN: Well developed, well nourished female. AAOX3. No acute distress.   Head: Normocephalic, atraumatic.   Eyes: FARHAN  Neck: Trachea is midline, no adenopathy  Resp: Breathing unlabored.  Neuro: Motor function normal, Cranial nerves intact  Psych: Mood and affect appropriate.    Assessment:     Imaging:  No new imaging ordered today.      1. Closed displaced segmental fracture of shaft of right tibia with routine healing, subsequent encounter    2. Injury of right tibial nerve, subsequent encounter    3. Right peroneal nerve palsy    4. Right foot pain        Plan:     - Encouraged the patient on the progress she is making with motor use of the foot.  - Referred to podiatry for evaluation of foot pain and potential treatment " options.  - Ordered X-ray of the foot to investigate the cause of pain and foot positioning issues.  - Provided lace-up ankle brace to help with stability while walking.   - Return to clinic in about 6 weeks with repeat radiographs and further evaluation.      Orders Placed This Encounter    Ambulatory referral/consult to Podiatry     Follow up in about 6 weeks (around 12/23/2024).      Patient note was created using MModal Dictation.  Any errors in syntax or even information may not have been identified and edited on initial review prior to signing this note.    This note was generated with the assistance of ambient listening technology. Verbal consent was obtained by the patient and accompanying visitor(s) for the recording of patient appointment to facilitate this note. I attest to having reviewed and edited the generated note for accuracy, though some syntax or spelling errors may persist. Please contact the author of this note for any clarification.

## 2024-11-18 ENCOUNTER — PATIENT MESSAGE (OUTPATIENT)
Dept: OBSTETRICS AND GYNECOLOGY | Facility: CLINIC | Age: 49
End: 2024-11-18
Payer: COMMERCIAL

## 2024-11-18 DIAGNOSIS — G57.31 RIGHT PERONEAL NERVE PALSY: ICD-10-CM

## 2024-11-18 DIAGNOSIS — S84.01XD: ICD-10-CM

## 2024-11-18 RX ORDER — GABAPENTIN 600 MG/1
600 TABLET ORAL
Qty: 30 TABLET | Refills: 2 | Status: SHIPPED | OUTPATIENT
Start: 2024-11-18

## 2024-11-20 ENCOUNTER — PATIENT MESSAGE (OUTPATIENT)
Dept: ORTHOPEDICS | Facility: CLINIC | Age: 49
End: 2024-11-20
Payer: COMMERCIAL

## 2024-11-20 ENCOUNTER — PATIENT MESSAGE (OUTPATIENT)
Dept: OBSTETRICS AND GYNECOLOGY | Facility: CLINIC | Age: 49
End: 2024-11-20
Payer: COMMERCIAL

## 2024-11-25 ENCOUNTER — OFFICE VISIT (OUTPATIENT)
Dept: PODIATRY | Facility: CLINIC | Age: 49
End: 2024-11-25
Payer: COMMERCIAL

## 2024-11-25 ENCOUNTER — HOSPITAL ENCOUNTER (OUTPATIENT)
Dept: RADIOLOGY | Facility: HOSPITAL | Age: 49
Discharge: HOME | End: 2024-11-25
Attending: PODIATRIST
Payer: COMMERCIAL

## 2024-11-25 DIAGNOSIS — M79.671 CHRONIC FOOT PAIN, RIGHT: Primary | ICD-10-CM

## 2024-11-25 DIAGNOSIS — M79.671 RIGHT FOOT PAIN: ICD-10-CM
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
## 2024-11-25 DIAGNOSIS — G89.29 CHRONIC FOOT PAIN, RIGHT: Primary | ICD-10-CM

## 2024-11-25 PROCEDURE — 1159F MED LIST DOCD IN RCRD: CPT | Mod: CPTII,S$GLB,, | Performed by: PODIATRIST

## 2024-11-25 PROCEDURE — 99204 OFFICE O/P NEW MOD 45 MIN: CPT | Mod: S$GLB,,, | Performed by: PODIATRIST

## 2024-11-25 PROCEDURE — 3044F HG A1C LEVEL LT 7.0%: CPT | Mod: CPTII,S$GLB,, | Performed by: PODIATRIST

## 2024-11-25 PROCEDURE — 99999 PR PBB SHADOW E&M-EST. PATIENT-LVL III: CPT | Mod: PBBFAC,,, | Performed by: PODIATRIST

## 2024-11-25 PROCEDURE — 1160F RVW MEDS BY RX/DR IN RCRD: CPT | Mod: CPTII,S$GLB,, | Performed by: PODIATRIST

## 2024-11-25 PROCEDURE — 73630 X-RAY EXAM OF FOOT: CPT | Mod: 26,RT,, | Performed by: RADIOLOGY

## 2024-11-25 PROCEDURE — 73630 X-RAY EXAM OF FOOT: CPT | Mod: TC,RT
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
## 2024-12-01 ENCOUNTER — OFFICE VISIT (OUTPATIENT)
Dept: URGENT CARE | Facility: CLINIC | Age: 49
End: 2024-12-01
Payer: COMMERCIAL

## 2024-12-01 VITALS
DIASTOLIC BLOOD PRESSURE: 64 MMHG | RESPIRATION RATE: 14 BRPM | SYSTOLIC BLOOD PRESSURE: 114 MMHG | HEIGHT: 63 IN | WEIGHT: 240.44 LBS | BODY MASS INDEX: 42.6 KG/M2 | TEMPERATURE: 99 F | OXYGEN SATURATION: 96 % | HEART RATE: 91 BPM

## 2024-12-01 DIAGNOSIS — R09.81 NASAL CONGESTION: ICD-10-CM

## 2024-12-01 DIAGNOSIS — R05.9 COUGH, UNSPECIFIED TYPE: Primary | ICD-10-CM

## 2024-12-01 DIAGNOSIS — R68.83 CHILLS: ICD-10-CM

## 2024-12-01 DIAGNOSIS — J11.1 INFLUENZA: ICD-10-CM

## 2024-12-01 LAB
CTP QC/QA: YES
CTP QC/QA: YES
POC MOLECULAR INFLUENZA A AGN: POSITIVE
POC MOLECULAR INFLUENZA B AGN: NEGATIVE
SARS-COV-2 AG RESP QL IA.RAPID: NEGATIVE

## 2024-12-01 PROCEDURE — 87502 INFLUENZA DNA AMP PROBE: CPT | Mod: QW,S$GLB,, | Performed by: NURSE PRACTITIONER

## 2024-12-01 PROCEDURE — 87811 SARS-COV-2 COVID19 W/OPTIC: CPT | Mod: QW,S$GLB,, | Performed by: NURSE PRACTITIONER

## 2024-12-01 PROCEDURE — 99214 OFFICE O/P EST MOD 30 MIN: CPT | Mod: S$GLB,,, | Performed by: NURSE PRACTITIONER

## 2024-12-01 RX ORDER — OSELTAMIVIR PHOSPHATE 75 MG/1
75 CAPSULE ORAL 2 TIMES DAILY
Qty: 10 CAPSULE | Refills: 0 | Status: SHIPPED | OUTPATIENT
Start: 2024-12-01 | End: 2024-12-06

## 2024-12-01 RX ORDER — BENZONATATE 200 MG/1
200 CAPSULE ORAL 3 TIMES DAILY PRN
Qty: 25 CAPSULE | Refills: 0 | Status: SHIPPED | OUTPATIENT
Start: 2024-12-01 | End: 2024-12-11

## 2024-12-01 NOTE — LETTER
December 1, 2024      [facility]  [address], [address]  [facility] [address]  Phone: [phone]  Fax: [phone]       Patient: Harrison Estevez   YOB: 1975  Date of Visit: 12/01/2024    To Whom It May Concern:    Vidhi Estevez  was at [facility] on 12/01/2024. The patient may return to work/school on 12/03/24 with no restrictions. If you have any questions or concerns, or if I can be of further assistance, please do not hesitate to contact me.    Sincerely,      Lissy Montana, NP

## 2024-12-01 NOTE — PROGRESS NOTES
"Subjective:      Patient ID: Harrison Estevez is a 49 y.o. female.    Vitals:  height is 5' 3" (1.6 m) and weight is 109.1 kg (240 lb 6.6 oz). Her temperature is 98.6 °F (37 °C). Her blood pressure is 114/64 and her pulse is 91. Her respiration is 14 and oxygen saturation is 96%.     Chief Complaint: Cough    Patient was a 49-year-old female who presents for evaluation of a cough with associated congestion, body aches and chills.  Onset Friday.  Treating with over-the-counter medication.  Reports daughter has been sick with similar symptoms.  Denies dyspnea, wheezing, vomiting, diarrhea or rash.  No other concerns voiced.    Cough  This is a new problem. The problem has been gradually worsening. Associated symptoms include chills, ear congestion, headaches, nasal congestion, postnasal drip, a sore throat and sweats. Pertinent negatives include no ear pain, fever, hemoptysis, myalgias, rash, rhinorrhea, shortness of breath, weight loss or wheezing. She has tried OTC cough suppressant for the symptoms. Improvement on treatment: thera flu and dayquil.       Constitution: Positive for chills. Negative for fever.   HENT:  Positive for postnasal drip and sore throat. Negative for ear pain.    Respiratory:  Positive for cough. Negative for sputum production, bloody sputum, shortness of breath, stridor and wheezing.    Gastrointestinal:  Negative for vomiting and diarrhea.   Musculoskeletal:  Negative for muscle ache.   Skin:  Negative for rash.   Neurological:  Positive for headaches.      Objective:     Physical Exam   Constitutional: She is oriented to person, place, and time. She appears well-developed. She is cooperative.  Non-toxic appearance. She does not appear ill. No distress.   HENT:   Head: Normocephalic and atraumatic.   Ears:   Right Ear: Hearing and external ear normal.   Left Ear: Hearing and external ear normal.   Nose: Congestion present. No mucosal edema, rhinorrhea or nasal deformity. No epistaxis. Right " sinus exhibits no maxillary sinus tenderness and no frontal sinus tenderness. Left sinus exhibits no maxillary sinus tenderness and no frontal sinus tenderness.   Mouth/Throat: Uvula is midline and mucous membranes are normal. No trismus in the jaw. Normal dentition. No uvula swelling. Posterior oropharyngeal erythema present. No oropharyngeal exudate or posterior oropharyngeal edema.   Eyes: Conjunctivae and lids are normal. No scleral icterus.   Neck: Trachea normal and phonation normal. Neck supple. No edema present. No erythema present. No neck rigidity present.   Cardiovascular: Normal rate, regular rhythm, normal heart sounds and normal pulses.   Pulmonary/Chest: Effort normal and breath sounds normal. No respiratory distress. She has no decreased breath sounds. She has no wheezes. She has no rhonchi. She has no rales.   Abdominal: Normal appearance.   Musculoskeletal: Normal range of motion.         General: No deformity. Normal range of motion.   Neurological: She is alert and oriented to person, place, and time. She exhibits normal muscle tone. Coordination normal.   Skin: Skin is warm, dry, intact, not diaphoretic and not pale.   Psychiatric: Her speech is normal and behavior is normal. Judgment and thought content normal.   Nursing note and vitals reviewed.      Assessment:     1. Cough, unspecified type    2. Chills    3. Nasal congestion    4. Influenza        Plan:       Cough, unspecified type  -     POCT Influenza A/B MOLECULAR  -     SARS Coronavirus 2 Antigen, POCT Manual Read    Chills    Nasal congestion    Influenza    Other orders  -     benzonatate (TESSALON) 200 MG capsule; Take 1 capsule (200 mg total) by mouth 3 (three) times daily as needed for Cough.  Dispense: 25 capsule; Refill: 0  -     oseltamivir (TAMIFLU) 75 MG capsule; Take 1 capsule (75 mg total) by mouth 2 (two) times daily. for 5 days  Dispense: 10 capsule; Refill: 0          Medical Decision Making:   Initial Assessment:    After complete evaluation, including thorough history and physical exam, the patient's symptoms are most likely due to viral upper respiratory infection. There are no concerning features on physical exam to suggest bacterial otitis media/externa, sinusitis, strep pharyngitis, or peritonsillar abscess. Vital signs do not suggest sepsis. Lung sounds are clear and not consistent with pneumonia. There is no neck pain or limited ROM to suggest retropharyngeal abscess or meningitis. In clinic testing for flu is positive.The patient will be treated with supportive care. Will provide RX for tamiflu upon D/C. Follow up instructions and ED precautions provided.          Results for orders placed or performed in visit on 12/01/24   POCT Influenza A/B MOLECULAR    Collection Time: 12/01/24  1:41 PM   Result Value Ref Range    POC Molecular Influenza A Ag Positive (A) Negative    POC Molecular Influenza B Ag Negative Negative     Acceptable Yes    SARS Coronavirus 2 Antigen, POCT Manual Read    Collection Time: 12/01/24  1:47 PM   Result Value Ref Range    SARS Coronavirus 2 Antigen Negative Negative     Acceptable Yes      Patient Instructions   You are positive for the flu    Seek immediate medical care if you develop fever, chest pain, or shortness of breath.     You were prescribed Tamiflu which has been shown to reduce duration and severity of the flu. Take medication as prescribed.     You will need to stay home for a few days and a work/school note was provided    Try these tips to keep yourself comfortable:                   -Get plenty of rest.                   -Drink plenty of fluids, at least 8 large glasses of fluid a day. Good fluid choices are water, fruit juices high in Vitamin C, tea, gelatin, or broths and soups. These help to keep mucus thin and ease congestion.                  -Use salt water gargle, cough drops or throat sprays to relieve throat pain. Mi ¼ to ½ teaspoon of  salt in 1 cup of warm water for a salt water gargle  solution.                  -Use petroleum jelly or lip balm around lips and nose to prevent chapping.                  -Use saline nose drops or spray to help ease congestion.    Over the Counter (OTC) Medicines:  Take over the counter medicines as needed to ease your signs.  Read labels carefully.  Use a product that treats only the signs that you have. Ask your pharmacist  for recommendations. Be sure to ask about possible interactions with other  medicines you are taking.  Common medicines used to treat signs of the flu include:     -Flonase daily.  -Claritin or Zyrtec daily.  -Mucinex every 12 hours -- Drink plenty of water while taking this medication.    - Cough suppressant, also called antitussive, such as dextromethorphan.  This medicine decreases your reflex and sensitivity to cough. This  medicine may be kept behind the pharmacy counter for purchase.    Cold and cough medicines often contain more than one type of medicine.  Ask the pharmacist for help to confirm that you are not using more than one  product with the same or similar ingredient. For example, some cold and  cough medicines have acetaminophen or ibuprofen in them to help lower a  fever or ease muscle aches. Do not take extra acetaminophen (Tylenol) or  ibuprofen (Advil, Motrin) if the cold or cough medicine has it as an  ingredient. Too much medicine could be harmful.    Take the correct dose as listed on the package. Do not take more than  recommended.    Use a Humidifier:  A cool mist humidifier can make breathing easier by thinning mucus. Do not use  a steam humidifier as hot water can cause burns if spilled.  Place the humidifier a few feet from the bed. Drain and clean each day with  soap and water to prevent bacteria and mold from growing.  Indoor humidity should not be above 50%. Stop using the humidifier if you  notice moisture on windows, walls or pictures.  You do not need to add  any medicine to the humidifier.  If you cannot get a humidifier, place a pan of water next to heating vents and  refill the water level daily. The water will evaporate and add moisture to the  Room.    How to prevent the spread of viral illness including flu:  -Wash your hands with soap and water or use alcohol based hand   often. Dry hands wet from washing with soap on a paper towel instead of cloth towel.  -Cough or sneeze into your elbow to avoid spreading germs.  -Wipe down common surfaces, such as door knobs and faucet handles, with a disinfectant spray.  -Do not share cups or utensils.        Please follow up with your Primary care provider within 2-5 days if your signs and symptoms have not resolved or worsen.      If your condition worsens or fails to improve we recommend that you receive another evaluation at the emergency room immediately or contact your primary medical clinic to discuss your concerns.   You must understand that you have received an Urgent Care treatment only and that you may be released before all of your medical problems are known or treated. You, the patient, will arrange for follow up care as instructed.

## 2024-12-01 NOTE — PATIENT INSTRUCTIONS
You are positive for the flu    Seek immediate medical care if you develop fever, chest pain, or shortness of breath.     You were prescribed Tamiflu which has been shown to reduce duration and severity of the flu. Take medication as prescribed.     You will need to stay home for a few days and a work/school note was provided    Try these tips to keep yourself comfortable:                   -Get plenty of rest.                   -Drink plenty of fluids, at least 8 large glasses of fluid a day. Good fluid choices are water, fruit juices high in Vitamin C, tea, gelatin, or broths and soups. These help to keep mucus thin and ease congestion.                  -Use salt water gargle, cough drops or throat sprays to relieve throat pain. Mi ¼ to ½ teaspoon of salt in 1 cup of warm water for a salt water gargle  solution.                  -Use petroleum jelly or lip balm around lips and nose to prevent chapping.                  -Use saline nose drops or spray to help ease congestion.    Over the Counter (OTC) Medicines:  Take over the counter medicines as needed to ease your signs.  Read labels carefully.  Use a product that treats only the signs that you have. Ask your pharmacist  for recommendations. Be sure to ask about possible interactions with other  medicines you are taking.  Common medicines used to treat signs of the flu include:     -Flonase daily.  -Claritin or Zyrtec daily.  -Mucinex every 12 hours -- Drink plenty of water while taking this medication.    - Cough suppressant, also called antitussive, such as dextromethorphan.  This medicine decreases your reflex and sensitivity to cough. This  medicine may be kept behind the pharmacy counter for purchase.    Cold and cough medicines often contain more than one type of medicine.  Ask the pharmacist for help to confirm that you are not using more than one  product with the same or similar ingredient. For example, some cold and  cough medicines have acetaminophen  or ibuprofen in them to help lower a  fever or ease muscle aches. Do not take extra acetaminophen (Tylenol) or  ibuprofen (Advil, Motrin) if the cold or cough medicine has it as an  ingredient. Too much medicine could be harmful.    Take the correct dose as listed on the package. Do not take more than  recommended.    Use a Humidifier:  A cool mist humidifier can make breathing easier by thinning mucus. Do not use  a steam humidifier as hot water can cause burns if spilled.  Place the humidifier a few feet from the bed. Drain and clean each day with  soap and water to prevent bacteria and mold from growing.  Indoor humidity should not be above 50%. Stop using the humidifier if you  notice moisture on windows, walls or pictures.  You do not need to add any medicine to the humidifier.  If you cannot get a humidifier, place a pan of water next to heating vents and  refill the water level daily. The water will evaporate and add moisture to the  Room.    How to prevent the spread of viral illness including flu:  -Wash your hands with soap and water or use alcohol based hand   often. Dry hands wet from washing with soap on a paper towel instead of cloth towel.  -Cough or sneeze into your elbow to avoid spreading germs.  -Wipe down common surfaces, such as door knobs and faucet handles, with a disinfectant spray.  -Do not share cups or utensils.        Please follow up with your Primary care provider within 2-5 days if your signs and symptoms have not resolved or worsen.      If your condition worsens or fails to improve we recommend that you receive another evaluation at the emergency room immediately or contact your primary medical clinic to discuss your concerns.   You must understand that you have received an Urgent Care treatment only and that you may be released before all of your medical problems are known or treated. You, the patient, will arrange for follow up care as instructed.

## 2024-12-16 ENCOUNTER — OFFICE VISIT (OUTPATIENT)
Dept: OBSTETRICS AND GYNECOLOGY | Facility: CLINIC | Age: 49
End: 2024-12-16
Payer: COMMERCIAL

## 2024-12-16 ENCOUNTER — LAB VISIT (OUTPATIENT)
Dept: LAB | Facility: HOSPITAL | Age: 49
End: 2024-12-16
Attending: OBSTETRICS & GYNECOLOGY
Payer: COMMERCIAL

## 2024-12-16 VITALS
HEIGHT: 63 IN | WEIGHT: 248.69 LBS | DIASTOLIC BLOOD PRESSURE: 74 MMHG | BODY MASS INDEX: 44.06 KG/M2 | SYSTOLIC BLOOD PRESSURE: 108 MMHG

## 2024-12-16 DIAGNOSIS — N99.3 PROLAPSE OF VAGINAL CUFF AFTER HYSTERECTOMY: Primary | ICD-10-CM

## 2024-12-16 DIAGNOSIS — N99.3 PROLAPSE OF VAGINAL CUFF AFTER HYSTERECTOMY: ICD-10-CM

## 2024-12-16 PROBLEM — R10.9 FLANK PAIN: Status: RESOLVED | Noted: 2024-01-03 | Resolved: 2024-12-16

## 2024-12-16 PROBLEM — D62 ACUTE BLOOD LOSS ANEMIA: Status: RESOLVED | Noted: 2024-01-07 | Resolved: 2024-12-16

## 2024-12-16 LAB
ANION GAP SERPL CALC-SCNC: 8 MMOL/L (ref 8–16)
BASOPHILS # BLD AUTO: 0.04 K/UL (ref 0–0.2)
BASOPHILS NFR BLD: 0.5 % (ref 0–1.9)
BUN SERPL-MCNC: 11 MG/DL (ref 6–20)
CALCIUM SERPL-MCNC: 9.3 MG/DL (ref 8.7–10.5)
CHLORIDE SERPL-SCNC: 108 MMOL/L (ref 95–110)
CO2 SERPL-SCNC: 24 MMOL/L (ref 23–29)
CREAT SERPL-MCNC: 0.8 MG/DL (ref 0.5–1.4)
DIFFERENTIAL METHOD BLD: ABNORMAL
EOSINOPHIL # BLD AUTO: 0.2 K/UL (ref 0–0.5)
EOSINOPHIL NFR BLD: 2.5 % (ref 0–8)
ERYTHROCYTE [DISTWIDTH] IN BLOOD BY AUTOMATED COUNT: 15.4 % (ref 11.5–14.5)
EST. GFR  (NO RACE VARIABLE): >60 ML/MIN/1.73 M^2
GLUCOSE SERPL-MCNC: 77 MG/DL (ref 70–110)
HCT VFR BLD AUTO: 35.1 % (ref 37–48.5)
HGB BLD-MCNC: 10.5 G/DL (ref 12–16)
IMM GRANULOCYTES # BLD AUTO: 0.03 K/UL (ref 0–0.04)
IMM GRANULOCYTES NFR BLD AUTO: 0.4 % (ref 0–0.5)
LYMPHOCYTES # BLD AUTO: 2.2 K/UL (ref 1–4.8)
LYMPHOCYTES NFR BLD: 26.9 % (ref 18–48)
MCH RBC QN AUTO: 21.4 PG (ref 27–31)
MCHC RBC AUTO-ENTMCNC: 29.9 G/DL (ref 32–36)
MCV RBC AUTO: 72 FL (ref 82–98)
MONOCYTES # BLD AUTO: 0.7 K/UL (ref 0.3–1)
MONOCYTES NFR BLD: 8.5 % (ref 4–15)
NEUTROPHILS # BLD AUTO: 5 K/UL (ref 1.8–7.7)
NEUTROPHILS NFR BLD: 61.2 % (ref 38–73)
NRBC BLD-RTO: 0 /100 WBC
PLATELET # BLD AUTO: 460 K/UL (ref 150–450)
PMV BLD AUTO: 9.7 FL (ref 9.2–12.9)
POTASSIUM SERPL-SCNC: 4.6 MMOL/L (ref 3.5–5.1)
RBC # BLD AUTO: 4.9 M/UL (ref 4–5.4)
SODIUM SERPL-SCNC: 140 MMOL/L (ref 136–145)
WBC # BLD AUTO: 8.1 K/UL (ref 3.9–12.7)

## 2024-12-16 PROCEDURE — 80048 BASIC METABOLIC PNL TOTAL CA: CPT | Performed by: OBSTETRICS & GYNECOLOGY

## 2024-12-16 PROCEDURE — 36415 COLL VENOUS BLD VENIPUNCTURE: CPT | Performed by: OBSTETRICS & GYNECOLOGY

## 2024-12-16 PROCEDURE — 99499 UNLISTED E&M SERVICE: CPT | Mod: S$GLB,,, | Performed by: OBSTETRICS & GYNECOLOGY

## 2024-12-16 PROCEDURE — 85025 COMPLETE CBC W/AUTO DIFF WBC: CPT | Performed by: OBSTETRICS & GYNECOLOGY

## 2024-12-16 PROCEDURE — 99999 PR PBB SHADOW E&M-EST. PATIENT-LVL III: CPT | Mod: PBBFAC,,, | Performed by: OBSTETRICS & GYNECOLOGY

## 2024-12-16 RX ORDER — ONDANSETRON HYDROCHLORIDE 2 MG/ML
4 INJECTION, SOLUTION INTRAVENOUS DAILY PRN
OUTPATIENT
Start: 2024-12-16

## 2024-12-16 RX ORDER — FAMOTIDINE 20 MG/1
20 TABLET, FILM COATED ORAL
OUTPATIENT
Start: 2024-12-16

## 2024-12-16 RX ORDER — CEFAZOLIN SODIUM 2 G/50ML
2 SOLUTION INTRAVENOUS
OUTPATIENT
Start: 2024-12-16

## 2024-12-16 RX ORDER — HYDROCODONE BITARTRATE AND ACETAMINOPHEN 5; 325 MG/1; MG/1
1 TABLET ORAL EVERY 4 HOURS PRN
OUTPATIENT
Start: 2024-12-16

## 2024-12-16 RX ORDER — PREGABALIN 25 MG/1
50 CAPSULE ORAL
OUTPATIENT
Start: 2024-12-16

## 2024-12-16 RX ORDER — DIPHENHYDRAMINE HCL 25 MG
25 CAPSULE ORAL EVERY 4 HOURS PRN
OUTPATIENT
Start: 2024-12-16

## 2024-12-16 RX ORDER — DIPHENHYDRAMINE HYDROCHLORIDE 50 MG/ML
25 INJECTION INTRAMUSCULAR; INTRAVENOUS EVERY 4 HOURS PRN
OUTPATIENT
Start: 2024-12-16

## 2024-12-16 RX ORDER — AMOXICILLIN 250 MG
1 CAPSULE ORAL 2 TIMES DAILY
OUTPATIENT
Start: 2024-12-16

## 2024-12-16 RX ORDER — ONDANSETRON 8 MG/1
8 TABLET, ORALLY DISINTEGRATING ORAL EVERY 8 HOURS PRN
OUTPATIENT
Start: 2024-12-16

## 2024-12-16 RX ORDER — PROCHLORPERAZINE EDISYLATE 5 MG/ML
5 INJECTION INTRAMUSCULAR; INTRAVENOUS EVERY 10 MIN PRN
OUTPATIENT
Start: 2024-12-16

## 2024-12-16 RX ORDER — CELECOXIB 100 MG/1
400 CAPSULE ORAL
OUTPATIENT
Start: 2024-12-16

## 2024-12-16 RX ORDER — HYDROMORPHONE HYDROCHLORIDE 1 MG/ML
1 INJECTION, SOLUTION INTRAMUSCULAR; INTRAVENOUS; SUBCUTANEOUS EVERY 4 HOURS PRN
OUTPATIENT
Start: 2024-12-16

## 2024-12-16 RX ORDER — MUPIROCIN 20 MG/G
OINTMENT TOPICAL
OUTPATIENT
Start: 2024-12-16

## 2024-12-16 RX ORDER — HYDROMORPHONE HYDROCHLORIDE 1 MG/ML
0.2 INJECTION, SOLUTION INTRAMUSCULAR; INTRAVENOUS; SUBCUTANEOUS EVERY 5 MIN PRN
OUTPATIENT
Start: 2024-12-16

## 2024-12-16 RX ORDER — HYDROCODONE BITARTRATE AND ACETAMINOPHEN 10; 325 MG/1; MG/1
1 TABLET ORAL EVERY 4 HOURS PRN
OUTPATIENT
Start: 2024-12-16

## 2024-12-16 RX ORDER — LIDOCAINE HYDROCHLORIDE 10 MG/ML
0.5 INJECTION, SOLUTION EPIDURAL; INFILTRATION; INTRACAUDAL; PERINEURAL
OUTPATIENT
Start: 2024-12-16

## 2024-12-16 RX ORDER — MEPERIDINE HYDROCHLORIDE 25 MG/ML
12.5 INJECTION INTRAMUSCULAR; INTRAVENOUS; SUBCUTANEOUS ONCE AS NEEDED
OUTPATIENT
Start: 2024-12-16 | End: 2024-12-17

## 2024-12-16 RX ORDER — PROCHLORPERAZINE EDISYLATE 5 MG/ML
5 INJECTION INTRAMUSCULAR; INTRAVENOUS EVERY 6 HOURS PRN
OUTPATIENT
Start: 2024-12-16

## 2024-12-16 RX ORDER — SODIUM CHLORIDE 0.9 % (FLUSH) 0.9 %
3 SYRINGE (ML) INJECTION
OUTPATIENT
Start: 2024-12-16

## 2024-12-16 RX ORDER — ACETAMINOPHEN 325 MG/1
650 TABLET ORAL EVERY 4 HOURS PRN
OUTPATIENT
Start: 2024-12-16

## 2024-12-16 RX ORDER — SODIUM CHLORIDE, SODIUM LACTATE, POTASSIUM CHLORIDE, CALCIUM CHLORIDE 600; 310; 30; 20 MG/100ML; MG/100ML; MG/100ML; MG/100ML
INJECTION, SOLUTION INTRAVENOUS CONTINUOUS
OUTPATIENT
Start: 2024-12-16

## 2024-12-16 RX ORDER — ACETAMINOPHEN 500 MG
1000 TABLET ORAL
OUTPATIENT
Start: 2024-12-16

## 2024-12-16 RX ORDER — POLYETHYLENE GLYCOL 3350 17 G/17G
17 POWDER, FOR SOLUTION ORAL DAILY
OUTPATIENT
Start: 2024-12-16

## 2024-12-16 RX ORDER — IBUPROFEN 800 MG/1
800 TABLET ORAL
OUTPATIENT
Start: 2024-12-17

## 2024-12-16 RX ORDER — KETOROLAC TROMETHAMINE 30 MG/ML
30 INJECTION, SOLUTION INTRAMUSCULAR; INTRAVENOUS
OUTPATIENT
Start: 2024-12-16 | End: 2024-12-17

## 2024-12-16 NOTE — H&P
Subjective     Patient ID: Harrison Estevez is a 49 y.o. female.    Chief Complaint:  Pre-op Exam      History of Present Illness  HPI  Post hysterectomy with pelvic symptoms of urge and no refugio .  Vaginal cuff prolapse noted on exam for surgical repair     GYN & OB History  Patient's last menstrual period was 2023.   Date of Last Pap: 2023    OB History    Para Term  AB Living   3 3 3     3   SAB IAB Ectopic Multiple Live Births           3      # Outcome Date GA Lbr Indra/2nd Weight Sex Type Anes PTL Lv   3 Term 08    F CS-LTranv   SARAY   2 Term 89    F Vag-Spont   SARAY   1 Term 89    M Vag-Spont   SARAY     Past Medical History:   Diagnosis Date    Abnormal Pap smear 2011    Cryosurgery done    General anesthetics causing adverse effect in therapeutic use     slow to wake up following     Microcytic anemia 10/27/2024     Past Surgical History:   Procedure Laterality Date    ANKLE SURGERY      APPENDECTOMY      GYNECOLOGIC CRYOSURGERY  2011    HYSTERECTOMY      INSERTION OF INTRAMEDULLARY NAIL INTO TIBIA Right 2024    Procedure: INSERTION, INTRAMEDULLARY JANIS, TIBIA;  Surgeon: Brenda Henson DO;  Location: [facility] OR;  Service: Orthopedics;  Laterality: Right;    OOPHORECTOMY      ROBOT-ASSISTED LAPAROSCOPIC ABDOMINAL HYSTERECTOMY USING DA NICKY XI N/A 2023    Procedure: XI ROBOTIC HYSTERECTOMY;  Surgeon: BHUMI Shine MD;  Location: [facility] OR;  Service: OB/GYN;  Laterality: N/A;     Family History   Problem Relation Name Age of Onset    Breast cancer Maternal Grandmother      Breast cancer Maternal Aunt       Social History     Tobacco Use    Smoking status: Never    Smokeless tobacco: Never   Substance Use Topics    Alcohol use: Yes     Comment: occassional    Drug use: No     (Not in a hospital admission)    Review of patient's allergies indicates:   Allergen Reactions    Penicillanic sulfone bl beta-lactamase inhibitors Hives     Penicillins      Current Outpatient Medications   Medication Sig    docusate sodium (COLACE) 100 MG capsule Take 1 capsule (100 mg total) by mouth 2 (two) times daily.    estradioL (ESTRACE) 0.01 % (0.1 mg/gram) vaginal cream Place 1 g vaginally 3 (three) times a week.    ferrous sulfate (FEOSOL) 325 mg (65 mg iron) Tab tablet Take 1 tablet (325 mg total) by mouth once daily.    gabapentin (NEURONTIN) 600 MG tablet TAKE 1 TABLET BY MOUTH DAILY    naproxen (NAPROSYN) 500 MG tablet TAKE 1 TABLET BY MOUTH TWICE DAILY (Patient taking differently: Take 500 mg by mouth 2 (two) times daily. As needed)     No current facility-administered medications for this visit.       Review of Systems  Review of Systems   Constitutional:  Negative for activity change, appetite change, chills, fatigue, fever and unexpected weight change.   HENT:  Negative for mouth sores.    Eyes:  Negative for visual disturbance.   Respiratory:  Negative for cough and shortness of breath.    Cardiovascular:  Negative for chest pain and palpitations.   Gastrointestinal:  Negative for abdominal pain, bloating, blood in stool, constipation, diarrhea and nausea.   Genitourinary:  Positive for urgency. Negative for decreased libido, dyspareunia, dysuria, frequency, genital sores, hematuria, pelvic pain, vaginal discharge, urinary incontinence, postcoital bleeding, postmenopausal bleeding and vaginal odor.   Musculoskeletal:  Negative for back pain, joint swelling and myalgias.   Integumentary:  Negative for rash, breast mass, nipple discharge and breast skin changes.   Neurological:  Negative for syncope, numbness and headaches.   Hematological:  Negative for adenopathy. Does not bruise/bleed easily.   Psychiatric/Behavioral:  Negative for depression and sleep disturbance. The patient is not nervous/anxious.    Breast: Negative for mass, mastodynia, nipple discharge and skin changes         Objective   Physical Exam:   Constitutional: She is oriented to  person, place, and time. Vital signs are normal. She appears well-developed and well-nourished. No distress.    HENT:   Head: Normocephalic and atraumatic.   Right Ear: External ear normal.   Left Ear: External ear normal.   Nose: Nose normal.    Eyes: Pupils are equal, round, and reactive to light. Conjunctivae are normal.    Neck: Trachea normal. No JVD present. No thyroid mass and no thyromegaly present.    Cardiovascular:  Normal rate and regular rhythm.             Pulmonary/Chest: Effort normal and breath sounds normal. No respiratory distress.        Abdominal: Soft. Bowel sounds are normal. She exhibits no distension and no mass. There is no abdominal tenderness. No hernia. Hernia confirmed negative in the ventral area, confirmed negative in the right inguinal area and confirmed negative in the left inguinal area.     Genitourinary:    Vagina and rectum normal.   Rectum:      No external hemorrhoid or abnormal anal tone.     Labial bartholins normal.There is no rash, tenderness or lesion on the right labia. There is no rash, tenderness or lesion on the left labia. Right adnexum displays no mass, no tenderness and no fullness. Left adnexum displays no mass, no tenderness and no fullness. There is unspecified prolapse of vaginal walls in the vagina. No vaginal discharge, tenderness, bleeding, rectocele or cystocele in the vagina.    No foreign body in the vagina.   Cervix is absent.Uterus is absent.           Musculoskeletal: Normal range of motion.       Neurological: She is alert and oriented to person, place, and time. She has normal reflexes.    Skin: Skin is warm and dry. She is not diaphoretic.    Psychiatric: She has a normal mood and affect. Her speech is normal and behavior is normal. Thought content normal.            Assessment and Plan     1. 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    Diagnoses and all orders for this visit:    Prolapse  of vaginal cuff after hysterectomy  Comments:  Robotic sacral colpopexy

## 2024-12-16 NOTE — PROGRESS NOTES
I have explained the risks, benefits , and alternatives of robotic laparoscopic sacral colpopexy   in detail.  The patient voices understanding and all questions have been answered.  The patient agrees to proceed as planned.  Consent forms for the procedure have been reviewed and signed by the patient.

## 2024-12-17 ENCOUNTER — TELEPHONE (OUTPATIENT)
Dept: PREADMISSION TESTING | Facility: HOSPITAL | Age: 49
End: 2024-12-17
Payer: COMMERCIAL

## 2024-12-17 DIAGNOSIS — Z01.818 PRE-OP EXAM: Primary | ICD-10-CM

## 2024-12-17 NOTE — TELEPHONE ENCOUNTER
Good afternoon,    This patient is scheduled for a procedure with Dr. Shine on 12/27. Patient stated that she would like her surgery to be done at the [facility] location as she is more familiar with that hospital. May you please reach out to the patient in order to have her surgery moved?     Thanks,    Pre Admit Testing

## 2024-12-18 ENCOUNTER — HOSPITAL ENCOUNTER (OUTPATIENT)
Dept: RADIOLOGY | Facility: HOSPITAL | Age: 49
Discharge: HOME | End: 2024-12-18
Attending: PODIATRIST
Payer: COMMERCIAL

## 2024-12-18 ENCOUNTER — PATIENT MESSAGE (OUTPATIENT)
Dept: OBSTETRICS AND GYNECOLOGY | Facility: CLINIC | Age: 49
End: 2024-12-18
Payer: COMMERCIAL
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
## 2024-12-18 DIAGNOSIS — G89.29 CHRONIC FOOT PAIN, RIGHT: ICD-10-CM

## 2024-12-18 DIAGNOSIS — M79.671 CHRONIC FOOT PAIN, RIGHT: ICD-10-CM

## 2024-12-18 PROCEDURE — A9585 GADOBUTROL INJECTION: HCPCS | Performed by: PODIATRIST

## 2024-12-18 PROCEDURE — 73720 MRI LWR EXTREMITY W/O&W/DYE: CPT | Mod: 26,RT,, | Performed by: RADIOLOGY

## 2024-12-18 PROCEDURE — 73720 MRI LWR EXTREMITY W/O&W/DYE: CPT | Mod: TC,RT

## 2024-12-18 PROCEDURE — 25500020 PHARM REV CODE 255: Performed by: PODIATRIST

## 2024-12-18 RX ORDER — GADOBUTROL 604.72 MG/ML
10 INJECTION INTRAVENOUS
Status: COMPLETED | OUTPATIENT
Start: 2024-12-18 | End: 2024-12-18

## 2024-12-18 RX ADMIN — GADOBUTROL 10 ML: 604.72 INJECTION INTRAVENOUS at 05:12

## 2024-12-18 NOTE — TELEPHONE ENCOUNTER
Due to BMI, Pre-Admit contacted pt to come in to meet with anesthesiology for case being done at [facility] pt feels as if she's being charged for unnecessary visits and [facility] . Informed pt that when she canceled her previous sx date and requested this day, it was a day that he's operating at [facility]. If pt wants to be scheduled for [facility] I informed pt that her sx will have to pushed out to next year. Pt stated she will think about it and give us a call back.

## 2024-12-18 NOTE — TELEPHONE ENCOUNTER
Is someone able to call her and explain thoroughly why she needs this appt because she's not hearing what I have to say. I told her that [facility] has a specific BMI requirement and she's a little under which is why you guys need for her to come. She just keeps saying we're trying to get extra money out of her....

## 2024-12-23 ENCOUNTER — TELEPHONE (OUTPATIENT)
Dept: PREADMISSION TESTING | Facility: HOSPITAL | Age: 49
End: 2024-12-23
Payer: COMMERCIAL

## 2024-12-23 ENCOUNTER — HOSPITAL ENCOUNTER (OUTPATIENT)
Dept: RADIOLOGY | Facility: HOSPITAL | Age: 49
Discharge: HOME | End: 2024-12-23
Attending: NURSE PRACTITIONER
Payer: COMMERCIAL

## 2024-12-23 ENCOUNTER — HOSPITAL ENCOUNTER (OUTPATIENT)
Dept: CARDIOLOGY | Facility: HOSPITAL | Age: 49
Discharge: HOME | End: 2024-12-23
Payer: COMMERCIAL

## 2024-12-23 ENCOUNTER — OFFICE VISIT (OUTPATIENT)
Dept: INTERNAL MEDICINE | Facility: CLINIC | Age: 49
End: 2024-12-23
Payer: COMMERCIAL

## 2024-12-23 ENCOUNTER — ANESTHESIA EVENT (OUTPATIENT)
Dept: SURGERY | Facility: HOSPITAL | Age: 49
End: 2024-12-23
Payer: COMMERCIAL

## 2024-12-23 VITALS
DIASTOLIC BLOOD PRESSURE: 82 MMHG | SYSTOLIC BLOOD PRESSURE: 125 MMHG | HEART RATE: 91 BPM | TEMPERATURE: 97 F | WEIGHT: 246.94 LBS | BODY MASS INDEX: 43.74 KG/M2 | OXYGEN SATURATION: 98 %

## 2024-12-23 DIAGNOSIS — N99.3 PROLAPSE OF VAGINAL CUFF AFTER HYSTERECTOMY: Primary | ICD-10-CM

## 2024-12-23 DIAGNOSIS — S82.261A CLOSED DISPLACED SEGMENTAL FRACTURE OF SHAFT OF RIGHT TIBIA, INITIAL ENCOUNTER: ICD-10-CM

## 2024-12-23 DIAGNOSIS — I89.0 LYMPHEDEMA OF RIGHT LOWER EXTREMITY: ICD-10-CM

## 2024-12-23 DIAGNOSIS — D50.0 IRON DEFICIENCY ANEMIA DUE TO CHRONIC BLOOD LOSS: ICD-10-CM

## 2024-12-23 DIAGNOSIS — M54.12 CERVICAL RADICULOPATHY: ICD-10-CM

## 2024-12-23 DIAGNOSIS — E66.01 SEVERE OBESITY (BMI >= 40): ICD-10-CM

## 2024-12-23 DIAGNOSIS — Z01.818 PRE-OP EXAM: ICD-10-CM

## 2024-12-23 PROCEDURE — 93005 ELECTROCARDIOGRAM TRACING: CPT

## 2024-12-23 PROCEDURE — 99999 PR PBB SHADOW E&M-EST. PATIENT-LVL III: CPT | Mod: PBBFAC,,,

## 2024-12-23 PROCEDURE — 71046 X-RAY EXAM CHEST 2 VIEWS: CPT | Mod: TC

## 2024-12-23 PROCEDURE — 93010 ELECTROCARDIOGRAM REPORT: CPT | Mod: ,,, | Performed by: INTERNAL MEDICINE

## 2024-12-23 PROCEDURE — 71046 X-RAY EXAM CHEST 2 VIEWS: CPT | Mod: 26,,, | Performed by: RADIOLOGY

## 2024-12-23 NOTE — DISCHARGE INSTRUCTIONS
To confirm, your doctor has instructed you: Surgery is scheduled for 12/27/24.   Pre admit office will call the afternoon prior to surgery between 1PM and 3PM with arrival time.    Surgery will be at [facility],  The address is [address] [place] [address].      IMPORTANT INSTRUCTIONS!    Do not eat or drink after 12 midnight, including water. Do not smoke or use chewing tobacco after 12 midnight!  OK to brush teeth, but no gum, candy, or mints!      *Take only these medicines with a small swallow of water-morning of surgery*     NONE         ____ Stop taking all vitamins, herbal supplements, Aspirin, & NSAIDS (Ibuprofen, Advil, Aleve) 7 days prior to surgery, as these can thin your blood.    ____ Weight loss medication, such as Adipex and Phentermine, must be stopped 14 days prior to surgery, no exceptions!    *Diabetic Patients: If you take diabetic or weight loss medication, do NOT take morning of surgery unless instructed by Doctor. Metformin to be stopped 24 hrs prior to surgery. DO NOT take short-acting insulin the day of surgery. Only take HALF of your regular dose of long-acting insulin the night before surgery, unless instructed otherwise. Blood sugars will be checked in pre-op.   ~Ozempic/Mounjaro/Wegovy/Trulicity/Semaglutide injections must be stopped 7 days prior to surgery.     Please notify MD office if you develop an active infection, are prescribed antibiotics by someone other than the surgeon doing your surgery, or visit urgent care/ER.    Bathing Instructions:   The night before surgery and the morning prior to coming to the hospital:    - Shower & rinse your body as usual with anti-bacterial Soap (Dial or Lever 2000)   -Hibiclens (if indicated) use AFTER anti-bacterial soap; 1 packet PM/1 packet in AM on surgical site only   -Do not use hibiclens on your head, face, or genitals.    -Do not wash with anti-bacterial soap after you use the hibiclens.    -Do not shave surgical  site 5-7 days prior to surgery.    -Pubic hair 7 days prior to surgery (OBGYN/Urology only).       Additional Instructions:   __ No makeup, powder, lotions, creams, or body spray to skin   __ No deodorant if you are having: breast procedure, PORT insertion, or shoulder surgery!   __ No nail polish or artificial nails due to risk of infection.             **SURGERY MAY BE CANCELLED AT SURGEON'S DISCRETION IF ARTIFICIAL/NAIL POLISH IS PRESENT!!!**  __ Please remove all piercings and leave all jewelry at home.    **SURGERY WILL BE CANCELLED IF PIERCINGS ARE PRESENT!!!**    __ Dentures, Hearing Aids and Contact Lens need to be removed prior to the start of surgery.    __ Avoid Alcoholic beverages 3 days prior to surgery, as it can thin the blood, unless told otherwise by pre-admit department.  __ Females: may need to give a urine sample the morning of surgery;   **Please ask  for a specimen cup if you need to use the restroom prior to being called into pre-op.**  __ Males: Stop ED medications (Viagra, Cialis) 24 hrs prior to surgery.  __ You must have transportation, and they MUST stay the entire time.   __  Bring photo ID and insurance information to hospital    What to Wear:  Clean, loose-fitting clothing. Please allow for dressings/bandages/surgical equipment/drains.   ~Breast Patients: We recommend a button up shirt so you do not have to lift your arms.   [employer] Link recommended by breast surgeons if you will have drains in place: https://a.co/d/sNDP7wE  ~Shoulder Patients: We recommend a button up shirt. If unavailable, an oversized t-shirt (2 sizes bigger than your normal size) or a stretchy dress will also work.   [employer] Link for hospital type button sleeve t-shirt: https://a.co/d/86BAbRk  ~Knee Patients: We recommend oversized pants/shorts or a dress to accommodate any knee braces in place. Braces will not be removed to get dressed.    [facility] Visitor/Ride Policy:    Only 1 adult allowed (18 or older)  to accompany you and MUST STAY through the entire admission length.      -Must have a ride home from a responsible adult that you know and trust.     -Medical Transport, Uber or Lyft can only be used if patient has a responsible adult to   accompany them during ride home.      ~Your ride MUST STAY the entire time until you are discharged~   Please notify the pre-admit department prior to surgery if you use medication transportation so we can verify your arrival/pickup time!   -The patient is responsible for setting up their own transportation!    Discharge Prescriptions:  Your discharge prescriptions will be sent next door to [facility] pharmacy, unless otherwise discussed with your surgeon. Your  will be responsible for calling the pharmacy ([phone]) to begin the prescription filling process. They will receive a text message with instructions once you are in recovery. Please make sure we have your insurance information and you bring a payment method (cash or card) if needed for prescriptions. If you have  insurance, please let the pre-op nurse know, as the pharmacy does not take this insurance.     *If you are running late or have questions the morning of surgery, please call the Pre-OP Department @ [phone].       *Please Call [facility] Pre-Admit Department for surgery instruction questions:   [phone]     Payment Questions:                Billing Question Numbers:         [phone]

## 2024-12-23 NOTE — ASSESSMENT & PLAN NOTE
-H/H 10.5/35.1 -MCV 72 per CBC on 12/16/2024   -iron supplementation continued- hold morning of surgery   -follow up with PCP as advised
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
## 2024-12-23 NOTE — ASSESSMENT & PLAN NOTE
-chronic   -normal ROM of cervical spine demonstrated upon exam   -Gabapentin continued nightly   -Tylenol continued as needed for pain  -Naproxen prescribed- last dose reported on 12/23/2024  -patient advised to hold all NSAIDS for 5-7 days prior to procedure

## 2024-12-23 NOTE — ASSESSMENT & PLAN NOTE
-10 months status post operative repair of right tibia with intramedullary nail per Ortho encounter on 11/11/2024  -followed outpatient by Orthopedic Surgery

## 2024-12-23 NOTE — ASSESSMENT & PLAN NOTE
-XI ROBOTIC SACROCOLPOPEXY, ABDOMINAL per D[place]erive and EXCISION, MASS, EXTREMITY (RIGHT) per Dr. Castellanos on 12/27/2024     Known risk factors for perioperative complications: Anemia    Difficulty with intubation is not anticipated.    Cardiac Risk Estimation: Based on the Revised Cardiac Risk index, patient is a Class I risk with a 3.9 % risk of a major cardiac event in a low risk procedure.    1.) Preoperative workup as follows: chest x-ray, ECG, hemoglobin, hematocrit, electrolytes, creatinine, glucose.  2.) Change in medication regimen before surgery: discontinue ASA 6 days before surgery, discontinue NSAIDs (Naproxen) 5 days before surgery, hold Metformin 24 hours prior to surgery. Hold all vitamins/supplements for 7 days prior to surgery, with the exception of Potassium and Iron supplementation. Hold semaglutide/tirzepatide for 7 days prior to surgery. Patient advised to refrain from use of ETOH for 72 hours prior to procedure.   3.) Prophylaxis for cardiac events with perioperative beta-blockers: not indicated.  4.) Invasive hemodynamic monitoring perioperatively: at the discretion of anesthesiologist.  5.) Deep vein thrombosis prophylaxis postoperatively: regimen to be chosen by surgical team.  6.) Surveillance for postoperative MI with ECG immediately postoperatively and on postoperati ve days 1 and 2 AND troponin levels 24 hours postoperatively and on day 4 or hospital discharge (whichever comes first): at the discretion of anesthesiologist.  7.) Current medications which may produce withdrawal symptoms if withheld perioperatively: None  8.) Other measures: Postoperative incentive spirometry to prevent pneumonia.

## 2024-12-23 NOTE — ANESTHESIA PREPROCEDURE EVALUATION
2024  Harrison Estevez is a 49 y.o., female     Patient Active Problem List   Diagnosis    Tibia/fibula fracture, right, closed, initial encounter    Musculoskeletal pain    Closed displaced segmental fracture of shaft of right tibia    Closed fracture of shaft of right fibula    Fracture of tibial plateau, closed, right, initial encounter    Right peroneal nerve palsy    Injury of right tibial nerve    Orthopedic aftercare    Lipedema    Lymphedema of right lower extremity    Weakness of both lower extremities    Difficulty in walking involving ankle and foot joint    Iron deficiency anemia due to chronic blood loss    Severe obesity (BMI >= 40)    Cervical radiculopathy    Prolapse of vaginal cuff after hysterectomy     Past Medical History:   Diagnosis Date    Abnormal Pap smear 2011    Cryosurgery done    General anesthetics causing adverse effect in therapeutic use     slow to wake up following     Microcytic anemia 10/27/2024     Past Surgical History:   Procedure Laterality Date    ANKLE SURGERY      APPENDECTOMY      GYNECOLOGIC CRYOSURGERY  2011    HYSTERECTOMY      INSERTION OF INTRAMEDULLARY NAIL INTO TIBIA Right 2024    Procedure: INSERTION, INTRAMEDULLARY JANIS, TIBIA;  Surgeon: Brenda Henson DO;  Location: [facility] OR;  Service: Orthopedics;  Laterality: Right;    OOPHORECTOMY      ROBOT-ASSISTED LAPAROSCOPIC ABDOMINAL HYSTERECTOMY USING DA NICKY XI N/A 2023    Procedure: XI ROBOTIC HYSTERECTOMY;  Surgeon: BHUMI Shine MD;  Location: [facility] OR;  Service: OB/GYN;  Laterality: N/A;       Chemistry        Component Value Date/Time     2024 1433    K 4.6 2024 1433     2024 1433    CO2 24 2024 1433    BUN 11 2024 1433    CREATININE 0.8 2024 1433    GLU 77 2024 1433        Component Value Date/Time     CALCIUM 9.3 12/16/2024 1433    ALKPHOS 100 10/15/2024 1651    AST 21 10/15/2024 1651    ALT 10 10/15/2024 1651    BILITOT 0.2 10/15/2024 1651        Lab Results   Component Value Date    WBC 8.10 12/16/2024    HGB 10.5 (L) 12/16/2024    HCT 35.1 (L) 12/16/2024    MCV 72 (L) 12/16/2024     (H) 12/16/2024       Pre-op Assessment    I have reviewed the Patient Summary Reports.    I have reviewed the NPO Status.   I have reviewed the Medications.     Review of Systems  Anesthesia Hx:  No problems with previous Anesthesia   History of prior surgery of interest to airway management or planning:  Previous anesthesia: General        Denies Family Hx of Anesthesia complications.   Personal Hx of Anesthesia complications          Slow To Awaken/Delayed Emergence          Social:  Non-Smoker       Hematology/Oncology:    Oncology Normal    -- Anemia:                                  Cardiovascular:  Cardiovascular Normal                  ECG has been reviewed. Normal sinus rhythm   Inferior infarct ,age undetermined   Cannot rule out Anterior infarct ,age undetermined   Abnormal ECG   When compared with ECG of 30-JUN-2023 06:19,   Minimal criteria for Anterior infarct are now Present   Inferior infarct is now Present   ST no longer elevated in Anterior leads   T wave inversion now evident in Anterior leads                              Pulmonary:  Pulmonary Normal                       Renal/:  Renal/ Normal                 Musculoskeletal:  Musculoskeletal Normal                OB/GYN/PEDS:  Intramural fibroid           Neurological:    Neuromuscular Disease,       Hx of Rt Tibial nerve injury; Rt peroneal nerve palsy                             Endocrine:  Endocrine Normal    BMI 43      Obesity / BMI > 30, Morbid Obesity / BMI > 40      Physical Exam  General: Alert and Oriented    Airway:  Mallampati: II   Mouth Opening: Normal  TM Distance: Normal  Tongue: Normal  Neck ROM: Normal  ROM    Dental:  Intact    Chest/Lungs:  Clear to auscultation, Normal Respiratory Rate    Heart:  Rate: Normal  Rhythm: Regular Rhythm        Anesthesia Plan  Type of Anesthesia, risks & benefits discussed:    Anesthesia Type: Gen ETT  Intra-op Monitoring Plan: Standard ASA Monitors  Post Op Pain Control Plan: multimodal analgesia and IV/PO Opioids PRN  Induction:  IV  Airway Plan: Direct  Informed Consent: Informed consent signed with the Patient and all parties understand the risks and agree with anesthesia plan.  All questions answered. Patient consented to blood products? No  ASA Score: 2  Day of Surgery Review of History & Physical: H&P Update referred to the surgeon/provider.I have interviewed and examined the patient. I have reviewed the patient's H&P dated:   Anesthesia Plan Notes: Intubation     Date/Time: 6/30/2023 7:12 AM  Performed by: Lissett Heck CRNA  Authorized by: Mika Farrell II, MD      Intubation:     Induction:  Intravenous    Intubated:  Postinduction    Mask Ventilation:  Easy mask    Attempts:  1    Attempted By:  CRNA    Method of Intubation:  Video laryngoscopy    Blade:  Wilda 2    Laryngeal View Grade: Grade I - full view of cords      Difficult Airway Encountered?: No      Complications:  None    Airway Device:  Oral endotracheal tube    Airway Device Size:  7.5    Style/Cuff Inflation:  Cuffed (inflated to minimal occlusive pressure)    Tube secured:  22    Secured at:  The lips    Placement Verified By:  Capnometry    Complicating Factors:  None    Findings Post-Intubation:  BS equal bilateral and atraumatic/condition of teeth unchanged      Ready For Surgery From Anesthesia Perspective.     .       The patient is a 28y Female complaining of abdominal pain.

## 2024-12-24 ENCOUNTER — TELEPHONE (OUTPATIENT)
Dept: OBSTETRICS AND GYNECOLOGY | Facility: CLINIC | Age: 49
End: 2024-12-24
Payer: COMMERCIAL

## 2024-12-24 LAB
OHS QRS DURATION: 84 MS
OHS QTC CALCULATION: 418 MS

## 2024-12-24 NOTE — TELEPHONE ENCOUNTER
Called pt to inform her that we would not be able to move her surgery to a different day to where the surgery would be at [facility] before the end of the year.  Pt verbalized understanding.  Pt is just concerned to have her surgery done not at the hospital location and stated that she did agreed to be scheduled at the [place].

## 2024-12-24 NOTE — TELEPHONE ENCOUNTER
"----- Message from Med Assistant Lety sent at 12/23/2024  3:43 PM CST -----  Regarding: RE: location concern  Sorry it's not possible  ----- Message -----  From: Aaron Green MA  Sent: 12/23/2024   3:34 PM CST  To: [facility] Scheduling  Subject: FW: location concern                               ----- Message -----  From: Tanya Vazquez RN  Sent: 12/23/2024   3:05 PM CST  To: [facility] Staff  Subject: location concern                                 Hello, this pt asked me to reach out to you and express her concern over having a surgery of   "this magnitude" at an Out Patient facility like the [facility]. She would feel more comfortable having her procedure here at [facility]. She wants you know she needs to have her sx by 01/31/24 in order to have her sx before her deduction ends. If that is not possible she will stay at the [facility].  Thank you  "

## 2024-12-26 ENCOUNTER — PATIENT MESSAGE (OUTPATIENT)
Dept: RESPIRATORY THERAPY | Facility: HOSPITAL | Age: 49
End: 2024-12-26
Payer: COMMERCIAL

## 2024-12-27 ENCOUNTER — HOSPITAL ENCOUNTER (OUTPATIENT)
Facility: HOSPITAL | Age: 49
Discharge: HOME | End: 2024-12-27
Attending: OBSTETRICS & GYNECOLOGY | Admitting: OBSTETRICS & GYNECOLOGY
Payer: COMMERCIAL

## 2024-12-27 ENCOUNTER — ANESTHESIA (OUTPATIENT)
Dept: SURGERY | Facility: HOSPITAL | Age: 49
End: 2024-12-27
Payer: COMMERCIAL

## 2024-12-27 VITALS
RESPIRATION RATE: 20 BRPM | BODY MASS INDEX: 43.45 KG/M2 | SYSTOLIC BLOOD PRESSURE: 120 MMHG | WEIGHT: 245.25 LBS | TEMPERATURE: 98 F | OXYGEN SATURATION: 97 % | DIASTOLIC BLOOD PRESSURE: 71 MMHG | HEART RATE: 68 BPM | HEIGHT: 63 IN

## 2024-12-27 DIAGNOSIS — S82.261D: ICD-10-CM

## 2024-12-27 DIAGNOSIS — N99.3 PROLAPSE OF VAGINAL CUFF AFTER HYSTERECTOMY: ICD-10-CM

## 2024-12-27 PROCEDURE — 27618 EXC LEG/ANKLE TUM < 3 CM: CPT | Mod: RT,,, | Performed by: SURGERY

## 2024-12-27 PROCEDURE — 25000003 PHARM REV CODE 250: Performed by: NURSE ANESTHETIST, CERTIFIED REGISTERED

## 2024-12-27 PROCEDURE — 88305 TISSUE EXAM BY PATHOLOGIST: CPT | Mod: 26,,, | Performed by: PATHOLOGY

## 2024-12-27 PROCEDURE — 63600175 PHARM REV CODE 636 W HCPCS: Performed by: NURSE ANESTHETIST, CERTIFIED REGISTERED

## 2024-12-27 PROCEDURE — 71000033 HC RECOVERY, INTIAL HOUR: Performed by: OBSTETRICS & GYNECOLOGY

## 2024-12-27 PROCEDURE — 88305 TISSUE EXAM BY PATHOLOGIST: CPT | Performed by: PATHOLOGY

## 2024-12-27 PROCEDURE — 27201423 OPTIME MED/SURG SUP & DEVICES STERILE SUPPLY: Performed by: OBSTETRICS & GYNECOLOGY

## 2024-12-27 PROCEDURE — 63600175 PHARM REV CODE 636 W HCPCS: Mod: JG | Performed by: SURGERY

## 2024-12-27 PROCEDURE — 57425 LAPAROSCOPY SURG COLPOPEXY: CPT | Mod: ,,, | Performed by: OBSTETRICS & GYNECOLOGY

## 2024-12-27 PROCEDURE — C1781 MESH (IMPLANTABLE): HCPCS | Performed by: OBSTETRICS & GYNECOLOGY

## 2024-12-27 PROCEDURE — 25000003 PHARM REV CODE 250: Performed by: OBSTETRICS & GYNECOLOGY

## 2024-12-27 PROCEDURE — 88307 TISSUE EXAM BY PATHOLOGIST: CPT | Performed by: PATHOLOGY

## 2024-12-27 PROCEDURE — 71000016 HC POSTOP RECOV ADDL HR: Performed by: OBSTETRICS & GYNECOLOGY

## 2024-12-27 PROCEDURE — 37000008 HC ANESTHESIA 1ST 15 MINUTES: Performed by: OBSTETRICS & GYNECOLOGY

## 2024-12-27 PROCEDURE — 36000712 HC OR TIME LEV V 1ST 15 MIN: Performed by: OBSTETRICS & GYNECOLOGY

## 2024-12-27 PROCEDURE — 36000713 HC OR TIME LEV V EA ADD 15 MIN: Performed by: OBSTETRICS & GYNECOLOGY

## 2024-12-27 PROCEDURE — 71000015 HC POSTOP RECOV 1ST HR: Performed by: OBSTETRICS & GYNECOLOGY

## 2024-12-27 PROCEDURE — 57425 LAPAROSCOPY SURG COLPOPEXY: CPT | Mod: AS,,, | Performed by: PHYSICIAN ASSISTANT

## 2024-12-27 PROCEDURE — 37000009 HC ANESTHESIA EA ADD 15 MINS: Performed by: OBSTETRICS & GYNECOLOGY

## 2024-12-27 PROCEDURE — 63600175 PHARM REV CODE 636 W HCPCS: Performed by: OBSTETRICS & GYNECOLOGY

## 2024-12-27 DEVICE — MESH RESTORELLE Y 24X4CM: Type: IMPLANTABLE DEVICE | Site: PELVIS | Status: FUNCTIONAL

## 2024-12-27 RX ORDER — CEFAZOLIN 2 G/1
2 INJECTION, POWDER, FOR SOLUTION INTRAMUSCULAR; INTRAVENOUS
Status: COMPLETED | OUTPATIENT
Start: 2024-12-27 | End: 2024-12-27

## 2024-12-27 RX ORDER — HYDROMORPHONE HYDROCHLORIDE 2 MG/ML
1 INJECTION, SOLUTION INTRAMUSCULAR; INTRAVENOUS; SUBCUTANEOUS EVERY 4 HOURS PRN
Status: DISCONTINUED | OUTPATIENT
Start: 2024-12-27 | End: 2024-12-27 | Stop reason: HOSPADM

## 2024-12-27 RX ORDER — HYDROCODONE BITARTRATE AND ACETAMINOPHEN 10; 325 MG/1; MG/1
1 TABLET ORAL EVERY 4 HOURS PRN
Status: DISCONTINUED | OUTPATIENT
Start: 2024-12-27 | End: 2024-12-27 | Stop reason: HOSPADM

## 2024-12-27 RX ORDER — DIPHENHYDRAMINE HCL 25 MG
25 CAPSULE ORAL EVERY 4 HOURS PRN
Status: DISCONTINUED | OUTPATIENT
Start: 2024-12-27 | End: 2024-12-27 | Stop reason: HOSPADM

## 2024-12-27 RX ORDER — IBUPROFEN 200 MG
800 TABLET ORAL
Status: DISCONTINUED | OUTPATIENT
Start: 2024-12-28 | End: 2024-12-27 | Stop reason: HOSPADM

## 2024-12-27 RX ORDER — LIDOCAINE HYDROCHLORIDE 10 MG/ML
0.5 INJECTION, SOLUTION EPIDURAL; INFILTRATION; INTRACAUDAL; PERINEURAL
Status: DISCONTINUED | OUTPATIENT
Start: 2024-12-27 | End: 2024-12-27 | Stop reason: HOSPADM

## 2024-12-27 RX ORDER — MIDAZOLAM HYDROCHLORIDE 1 MG/ML
INJECTION INTRAMUSCULAR; INTRAVENOUS
Status: DISCONTINUED | OUTPATIENT
Start: 2024-12-27 | End: 2024-12-27

## 2024-12-27 RX ORDER — SODIUM CHLORIDE, SODIUM LACTATE, POTASSIUM CHLORIDE, CALCIUM CHLORIDE 600; 310; 30; 20 MG/100ML; MG/100ML; MG/100ML; MG/100ML
INJECTION, SOLUTION INTRAVENOUS CONTINUOUS
Status: DISCONTINUED | OUTPATIENT
Start: 2024-12-27 | End: 2024-12-27 | Stop reason: HOSPADM

## 2024-12-27 RX ORDER — BUPIVACAINE HYDROCHLORIDE 2.5 MG/ML
INJECTION, SOLUTION EPIDURAL; INFILTRATION; INTRACAUDAL
Status: DISCONTINUED | OUTPATIENT
Start: 2024-12-27 | End: 2024-12-27 | Stop reason: HOSPADM

## 2024-12-27 RX ORDER — SUCCINYLCHOLINE CHLORIDE 20 MG/ML
INJECTION INTRAMUSCULAR; INTRAVENOUS
Status: DISCONTINUED | OUTPATIENT
Start: 2024-12-27 | End: 2024-12-27

## 2024-12-27 RX ORDER — PROCHLORPERAZINE EDISYLATE 5 MG/ML
5 INJECTION INTRAMUSCULAR; INTRAVENOUS EVERY 10 MIN PRN
Status: DISCONTINUED | OUTPATIENT
Start: 2024-12-27 | End: 2024-12-27 | Stop reason: HOSPADM

## 2024-12-27 RX ORDER — LIDOCAINE HYDROCHLORIDE 10 MG/ML
INJECTION, SOLUTION EPIDURAL; INFILTRATION; INTRACAUDAL; PERINEURAL
Status: DISCONTINUED
Start: 2024-12-27 | End: 2024-12-27 | Stop reason: WASHOUT

## 2024-12-27 RX ORDER — SODIUM CHLORIDE 0.9 % (FLUSH) 0.9 %
3 SYRINGE (ML) INJECTION
Status: DISCONTINUED | OUTPATIENT
Start: 2024-12-27 | End: 2024-12-27 | Stop reason: HOSPADM

## 2024-12-27 RX ORDER — HYDROCODONE BITARTRATE AND ACETAMINOPHEN 5; 325 MG/1; MG/1
1 TABLET ORAL EVERY 6 HOURS PRN
Qty: 15 TABLET | Refills: 0 | Status: SHIPPED | OUTPATIENT
Start: 2024-12-27 | End: 2025-01-03

## 2024-12-27 RX ORDER — CELECOXIB 100 MG/1
400 CAPSULE ORAL
Status: COMPLETED | OUTPATIENT
Start: 2024-12-27 | End: 2024-12-27

## 2024-12-27 RX ORDER — ROCURONIUM BROMIDE 10 MG/ML
INJECTION, SOLUTION INTRAVENOUS
Status: DISCONTINUED | OUTPATIENT
Start: 2024-12-27 | End: 2024-12-27

## 2024-12-27 RX ORDER — POLYETHYLENE GLYCOL 3350 17 G/17G
17 POWDER, FOR SOLUTION ORAL DAILY
Status: DISCONTINUED | OUTPATIENT
Start: 2024-12-27 | End: 2024-12-27 | Stop reason: HOSPADM

## 2024-12-27 RX ORDER — KETOROLAC TROMETHAMINE 30 MG/ML
INJECTION, SOLUTION INTRAMUSCULAR; INTRAVENOUS
Status: DISCONTINUED | OUTPATIENT
Start: 2024-12-27 | End: 2024-12-27

## 2024-12-27 RX ORDER — PHENYLEPHRINE HYDROCHLORIDE 10 MG/ML
INJECTION INTRAVENOUS
Status: DISCONTINUED | OUTPATIENT
Start: 2024-12-27 | End: 2024-12-27

## 2024-12-27 RX ORDER — PROPOFOL 10 MG/ML
VIAL (ML) INTRAVENOUS
Status: DISCONTINUED | OUTPATIENT
Start: 2024-12-27 | End: 2024-12-27

## 2024-12-27 RX ORDER — AMOXICILLIN 250 MG
1 CAPSULE ORAL 2 TIMES DAILY
Status: DISCONTINUED | OUTPATIENT
Start: 2024-12-27 | End: 2024-12-27 | Stop reason: HOSPADM

## 2024-12-27 RX ORDER — PREGABALIN 25 MG/1
50 CAPSULE ORAL
Status: DISCONTINUED | OUTPATIENT
Start: 2024-12-27 | End: 2024-12-27 | Stop reason: HOSPADM

## 2024-12-27 RX ORDER — PROCHLORPERAZINE EDISYLATE 5 MG/ML
5 INJECTION INTRAMUSCULAR; INTRAVENOUS EVERY 6 HOURS PRN
Status: DISCONTINUED | OUTPATIENT
Start: 2024-12-27 | End: 2024-12-27 | Stop reason: HOSPADM

## 2024-12-27 RX ORDER — DEXAMETHASONE SODIUM PHOSPHATE 4 MG/ML
INJECTION, SOLUTION INTRA-ARTICULAR; INTRALESIONAL; INTRAMUSCULAR; INTRAVENOUS; SOFT TISSUE
Status: DISCONTINUED | OUTPATIENT
Start: 2024-12-27 | End: 2024-12-27

## 2024-12-27 RX ORDER — FENTANYL CITRATE 50 UG/ML
INJECTION, SOLUTION INTRAMUSCULAR; INTRAVENOUS
Status: DISCONTINUED | OUTPATIENT
Start: 2024-12-27 | End: 2024-12-27

## 2024-12-27 RX ORDER — BUPIVACAINE HYDROCHLORIDE 2.5 MG/ML
INJECTION, SOLUTION EPIDURAL; INFILTRATION; INTRACAUDAL
Status: DISCONTINUED
Start: 2024-12-27 | End: 2024-12-27 | Stop reason: HOSPADM

## 2024-12-27 RX ORDER — MUPIROCIN 20 MG/G
OINTMENT TOPICAL
Status: DISCONTINUED | OUTPATIENT
Start: 2024-12-27 | End: 2024-12-27 | Stop reason: HOSPADM

## 2024-12-27 RX ORDER — HYDROCODONE BITARTRATE AND ACETAMINOPHEN 5; 325 MG/1; MG/1
1 TABLET ORAL EVERY 4 HOURS PRN
Status: DISCONTINUED | OUTPATIENT
Start: 2024-12-27 | End: 2024-12-27 | Stop reason: HOSPADM

## 2024-12-27 RX ORDER — FAMOTIDINE 20 MG/1
20 TABLET, FILM COATED ORAL
Status: COMPLETED | OUTPATIENT
Start: 2024-12-27 | End: 2024-12-27

## 2024-12-27 RX ORDER — ACETAMINOPHEN 325 MG/1
650 TABLET ORAL EVERY 4 HOURS PRN
Status: DISCONTINUED | OUTPATIENT
Start: 2024-12-27 | End: 2024-12-27 | Stop reason: HOSPADM

## 2024-12-27 RX ORDER — ONDANSETRON HYDROCHLORIDE 2 MG/ML
INJECTION, SOLUTION INTRAVENOUS
Status: DISCONTINUED | OUTPATIENT
Start: 2024-12-27 | End: 2024-12-27

## 2024-12-27 RX ORDER — ONDANSETRON 4 MG/1
8 TABLET, ORALLY DISINTEGRATING ORAL EVERY 8 HOURS PRN
Status: DISCONTINUED | OUTPATIENT
Start: 2024-12-27 | End: 2024-12-27 | Stop reason: HOSPADM

## 2024-12-27 RX ORDER — KETOROLAC TROMETHAMINE 30 MG/ML
30 INJECTION, SOLUTION INTRAMUSCULAR; INTRAVENOUS
Status: DISCONTINUED | OUTPATIENT
Start: 2024-12-27 | End: 2024-12-27 | Stop reason: HOSPADM

## 2024-12-27 RX ORDER — ACETAMINOPHEN 500 MG
1000 TABLET ORAL
Status: COMPLETED | OUTPATIENT
Start: 2024-12-27 | End: 2024-12-27

## 2024-12-27 RX ORDER — MEPERIDINE HYDROCHLORIDE 25 MG/ML
12.5 INJECTION INTRAMUSCULAR; INTRAVENOUS; SUBCUTANEOUS ONCE AS NEEDED
Status: DISCONTINUED | OUTPATIENT
Start: 2024-12-27 | End: 2024-12-27 | Stop reason: HOSPADM

## 2024-12-27 RX ORDER — DIPHENHYDRAMINE HYDROCHLORIDE 50 MG/ML
25 INJECTION INTRAMUSCULAR; INTRAVENOUS EVERY 4 HOURS PRN
Status: DISCONTINUED | OUTPATIENT
Start: 2024-12-27 | End: 2024-12-27 | Stop reason: HOSPADM

## 2024-12-27 RX ORDER — HYDROMORPHONE HYDROCHLORIDE 2 MG/ML
0.2 INJECTION, SOLUTION INTRAMUSCULAR; INTRAVENOUS; SUBCUTANEOUS EVERY 5 MIN PRN
Status: DISCONTINUED | OUTPATIENT
Start: 2024-12-27 | End: 2024-12-27 | Stop reason: HOSPADM

## 2024-12-27 RX ORDER — ONDANSETRON HYDROCHLORIDE 2 MG/ML
4 INJECTION, SOLUTION INTRAVENOUS DAILY PRN
Status: DISCONTINUED | OUTPATIENT
Start: 2024-12-27 | End: 2024-12-27 | Stop reason: HOSPADM

## 2024-12-27 RX ORDER — LIDOCAINE HYDROCHLORIDE 20 MG/ML
INJECTION, SOLUTION EPIDURAL; INFILTRATION; INTRACAUDAL; PERINEURAL
Status: DISCONTINUED | OUTPATIENT
Start: 2024-12-27 | End: 2024-12-27

## 2024-12-27 RX ADMIN — ROCURONIUM BROMIDE 20 MG: 10 SOLUTION INTRAVENOUS at 08:12

## 2024-12-27 RX ADMIN — CEFAZOLIN 2 G: 2 INJECTION, POWDER, FOR SOLUTION INTRAMUSCULAR; INTRAVENOUS at 07:12

## 2024-12-27 RX ADMIN — PHENYLEPHRINE HYDROCHLORIDE 200 MCG: 10 INJECTION INTRAVENOUS at 07:12

## 2024-12-27 RX ADMIN — HYDROCODONE BITARTRATE AND ACETAMINOPHEN 1 TABLET: 5; 325 TABLET ORAL at 10:12

## 2024-12-27 RX ADMIN — ACETAMINOPHEN 1000 MG: 500 TABLET ORAL at 06:12

## 2024-12-27 RX ADMIN — SODIUM CHLORIDE, POTASSIUM CHLORIDE, SODIUM LACTATE AND CALCIUM CHLORIDE: 600; 310; 30; 20 INJECTION, SOLUTION INTRAVENOUS at 07:12

## 2024-12-27 RX ADMIN — FAMOTIDINE 20 MG: 20 TABLET ORAL at 06:12

## 2024-12-27 RX ADMIN — FENTANYL CITRATE 50 MCG: 50 INJECTION, SOLUTION INTRAMUSCULAR; INTRAVENOUS at 09:12

## 2024-12-27 RX ADMIN — CELECOXIB 400 MG: 100 CAPSULE ORAL at 06:12

## 2024-12-27 RX ADMIN — PROPOFOL 200 MG: 10 INJECTION, EMULSION INTRAVENOUS at 07:12

## 2024-12-27 RX ADMIN — GLYCOPYRROLATE 0.2 MG: 0.2 INJECTION, SOLUTION INTRAMUSCULAR; INTRAVENOUS at 07:12

## 2024-12-27 RX ADMIN — SUGAMMADEX 200 MG: 100 INJECTION, SOLUTION INTRAVENOUS at 09:12

## 2024-12-27 RX ADMIN — DEXAMETHASONE SODIUM PHOSPHATE 8 MG: 4 INJECTION, SOLUTION INTRA-ARTICULAR; INTRALESIONAL; INTRAMUSCULAR; INTRAVENOUS; SOFT TISSUE at 07:12

## 2024-12-27 RX ADMIN — ROCURONIUM BROMIDE 40 MG: 10 SOLUTION INTRAVENOUS at 07:12

## 2024-12-27 RX ADMIN — ONDANSETRON 4 MG: 2 INJECTION INTRAMUSCULAR; INTRAVENOUS at 09:12

## 2024-12-27 RX ADMIN — ROCURONIUM BROMIDE 10 MG: 10 SOLUTION INTRAVENOUS at 07:12

## 2024-12-27 RX ADMIN — KETOROLAC TROMETHAMINE 15 MG: 30 INJECTION, SOLUTION INTRAMUSCULAR; INTRAVENOUS at 09:12

## 2024-12-27 RX ADMIN — LIDOCAINE HYDROCHLORIDE 100 MG: 20 INJECTION, SOLUTION EPIDURAL; INFILTRATION; INTRACAUDAL; PERINEURAL at 07:12

## 2024-12-27 RX ADMIN — FENTANYL CITRATE 100 MCG: 50 INJECTION, SOLUTION INTRAMUSCULAR; INTRAVENOUS at 07:12

## 2024-12-27 RX ADMIN — SUCCINYLCHOLINE CHLORIDE 140 MG: 20 INJECTION, SOLUTION INTRAMUSCULAR; INTRAVENOUS; PARENTERAL at 07:12

## 2024-12-27 RX ADMIN — MIDAZOLAM HYDROCHLORIDE 2 MG: 1 INJECTION, SOLUTION INTRAMUSCULAR; INTRAVENOUS at 06:12

## 2024-12-27 RX ADMIN — SODIUM CHLORIDE, POTASSIUM CHLORIDE, SODIUM LACTATE AND CALCIUM CHLORIDE: 600; 310; 30; 20 INJECTION, SOLUTION INTRAVENOUS at 06:12

## 2024-12-27 NOTE — ANESTHESIA PROCEDURE NOTES
Intubation    Date/Time: 12/27/2024 7:08 AM    Performed by: Kathi Singletary CRNA  Authorized by: Narda Zayas MD    Intubation:     Induction:  Intravenous    Intubated:  Postinduction    Mask Ventilation:  Easy mask    Attempts:  1    Attempted By:  CRNA    Method of Intubation:  Video laryngoscopy    Blade:  [place]wood 3    Laryngeal View Grade: Grade I - full view of cords      Difficult Airway Encountered?: No      Complications:  None    Airway Device:  Oral endotracheal tube    Airway Device Size:  7.0    Style/Cuff Inflation:  Cuffed    Inflation Amount (mL):  7    Tube secured:  21    Secured at:  The lips    Placement Verified By:  Capnometry    Complicating Factors:  None    Findings Post-Intubation:  BS equal bilateral and atraumatic/condition of teeth unchanged
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
## 2024-12-27 NOTE — OP NOTE
[facility]  Surgery Department  Operative Note    SUMMARY     Date of Procedure: 12/27/2024     Procedure: Procedure(s) (LRB):  XI ROBOTIC SACROCOLPOPEXY, ABDOMINAL (N/A)  EXCISION, MASS, EXTREMITY (Right)     Surgeons and Role:  Panel 1:     * BHUMI Shine MD - Primary  Panel 2:     * Willy Castellanos MD - Primary    Assisting Surgeon:       Kathi FRANCO assistance deemed necessary by MD     Pre-Operative Diagnosis: Prolapse of vaginal cuff after hysterectomy [N99.3]    Post-Operative Diagnosis: Post-Op Diagnosis Codes:     * Prolapse of vaginal cuff after hysterectomy [N99.3]    Anesthesia: General    Operative Findings (including complications, if any):      Upper abdomen with normal liver and no adhesions      Bowel is normal appearance      Uterus and tubes surgically absent, atrophic ovaries       No adhesions in the pelvis noted         Description of Technical Procedures:      SUMMARY:  The patient was taken to the surgical suite and general            intubation anesthesia induced, placed in supine lithotomy position in Clarence stirrups.    The vagina was prepped with Betadine.      Cade catheter placed and a  Blunt metal              manipulator placed in the vagina.                                                                  .  The abdomen was prepped with  chlorhexidine solution, and sterile drapes were then applied.              Timeout was taken with the entire operating room team.                                                       A Veress needle was placed in the umbilicus with elevation of the abdominal wall with towel clamps.    CO2 gas insuflation to 15 mm Hg pressure.        Trochar placement as follows        Four eight mm trochars placed 10 cm apart in a transverse line 5 cm above the umbilical line.  After the initial trochar placed, the remainder are placed under direct visualization                                                                     .  With the patient in Trendelenburg and left     lateral tilt, the da Nicole robot was docked using 4 arm technique.       The procedure began by         identifying the anatomy of the pelvis, sacrum and retroperitoneum.      No significant adhesions were appreciated.  The    sacral promontory was identified and exposed.  Middle sacral vessel was      electrocauterized.  The peritoneum was dissected by tunneling from the sacral incision to the cul de sac The vagina was            identified.   Dissection of the peritoneum off of the vagina was accomplished.                                                                          Then, using a Y-shaped graft, the graft was sewn to the anterior and         posterior vagina with [place]-Kai suture.  Approximately 12 sutures were        used.       Each needle was removed prior to inserting the next needle into the abdomen                                                                       The tail of the graft was then sutured to the sacrum with 2 stitches.       The graft material was trimmed and excess removed from the abdomen.                                                                         Reperitonealization was done using 2-0 V-Lock absorbable running suture      .  Good hemostasis was appreciated in the operating field     The urine was noted to be clear.  Both ureters appeared to be      within normal limits anatomically.     All of  the instruments were removed. The da Nicole system was undocked.                                                                          All needles used in the abdomen were accounted for.       A direct visualization technique was used  to close the fascia of the 12-mm trocar sites             with 0 Vicryl suture     .  Then, 4-0 Vicryl subcuticular and Dermabond was used on all sites.                                                                          The patient was then awakened and taken to Recovery in                                                                             stable condition.     Significant Surgical Tasks Conducted by the Assistant(s), if Applicable: first assist bedside and trochar closure     Estimated Blood Loss (EBL): 10 mL           Implants:   Implant Name Type Inv. Item Serial No.  Lot No. LRB No. Used Action   MESH RESTORELLE Y 24X4CM - [id number]  MESH RESTORELLE Y 24X4CM  MPATHY   1 Implanted       Specimens:   Specimen (24h ago, onward)       Start     Ordered    12/27/24 0900  Specimen to Pathology, Surgery General Surgery  Once        Comments: Pre-op Diagnosis: Prolapse of vaginal cuff after hysterectomy [N99.3]Procedure(s):XI ROBOTIC SACROCOLPOPEXY, ABDOMINALEXCISION, MASS, EXTREMITY Number of specimens: 1Name of specimens: 1-right leg nodule     References:    Click here for ordering Quick Tip   Question Answer Comment   Procedure Type: General Surgery    Specimen Class: Routine/Screening    Release to patient Immediate        12/27/24 0900                            Condition: Good    Disposition: PACU - hemodynamically stable.    Attestation: Op Note Attestation: I was physically present and scrubbed for the entire procedure.

## 2024-12-27 NOTE — OP NOTE
[facility]  Surgery Department  Operative Note    SUMMARY     Date of Procedure: 12/27/2024     Procedure: Procedure(s) (LRB):  XI ROBOTIC SACROCOLPOPEXY, ABDOMINAL (N/A)  EXCISION, MASS, EXTREMITY (Right)     Surgeons and Role:  Panel 1:     * BHUMI Shine MD - Primary  Panel 2:     * Willy Castellanos MD - Primary    Assisting Surgeon: None    Pre-Operative Diagnosis: Prolapse of vaginal cuff after hysterectomy [N99.3]    Post-Operative Diagnosis: Post-Op Diagnosis Codes:     * Prolapse of vaginal cuff after hysterectomy [N99.3]    Anesthesia: General    Operative Findings (including complications, if any):     A 1 cm area of likely fat necrosis    Description of Technical Procedures:     The patient was brought into the operative room placed on the operative table in the supine position.  General endotracheal anesthesia was induced.  IV antibiotics were administered.  Pneumatic compression devices on the left lower extremity.  The right lower extremity was prepped and draped in the standard fashion.      A time-out was performed.      An incision was made over the area of the nodule.  Subcutaneous tissues were dissected using electrocautery.  We ran into some milky appearing fluid as we were dissecting out the mass.  The mass appeared to be necrotic subcutaneous fat in appearance.      Another small compression of skin was excised from the lateral aspect to allow removal of the complete area of firm thickened subcutaneous tissue.  This firm thickened subcutaneous tissue was sharply excised using electrocautery.      Hemostasis was achieved.  Marcaine was infiltrated.  The deep layers were closed with 3-0 Vicryl in interrupted fashion.  The deep dermis with 3-0 Vicryl in interrupted fashion.  The skin with 4-0 Monocryl in a subcuticular manner.      Dermabond was placed.      Patient remained in the operating room for the gynecology procedure.      The final counts for the subcutaneous  nodule excision were correct    Significant Surgical Tasks Conducted by the Assistant(s), if Applicable:  None    Estimated Blood Loss (EBL): * 5 mL   IV fluids a proximally 150 mL   Marcaine 0.25% 10 mL *           Implants: * No implants in log *    Specimens:   Specimen (24h ago, onward)      None                    Condition: Stable    Disposition:  Remained in the operating room for the gynecologic procedure    Attestation: Op Note Attestation: I performed the procedure.

## 2024-12-27 NOTE — NURSING TRANSFER
Received patient from MARILYNN Zavaleta. Patient's vital signs stable, aaox3, bilateral nonslip socks on, bilateral siderails up, bed locked in lowest position, call light within reach, instructed to call staff for assistance. Patient due to void.   yes

## 2024-12-27 NOTE — TRANSFER OF CARE
"Anesthesia Transfer of Care Note    Patient: Harrison Estevez    Procedure(s) Performed: Procedure(s) (LRB):  XI ROBOTIC SACROCOLPOPEXY, ABDOMINAL (N/A)  EXCISION, MASS, EXTREMITY (Right)    Patient location: PACU    Anesthesia Type: general    Transport from OR: Transported from OR on room air with adequate spontaneous ventilation    Post pain: adequate analgesia    Post assessment: no apparent anesthetic complications and tolerated procedure well    Post vital signs: stable    Level of consciousness: responds to stimulation    Nausea/Vomiting: no nausea/vomiting    Complications: none    Transfer of care protocol was followed      Last vitals: Visit Vitals  BP (!) 143/78 (BP Location: Right arm, Patient Position: Sitting)   Pulse 83   Temp 36.6 °C (97.9 °F)   Resp 16   Ht 5' 3" (1.6 m)   Wt 111.3 kg (245 lb 4.2 oz)   LMP 04/12/2023   SpO2 98%   Breastfeeding No   BMI 43.45 kg/m²     "

## 2024-12-27 NOTE — ANESTHESIA POSTPROCEDURE EVALUATION
Anesthesia Post Evaluation    Patient: Harrison Estevez    Procedure(s) Performed: Procedure(s) (LRB):  XI ROBOTIC SACROCOLPOPEXY, ABDOMINAL (N/A)  EXCISION, MASS, EXTREMITY (Right)    Final Anesthesia Type: general      Patient location during evaluation: PACU  Patient participation: Yes- Able to Participate  Level of consciousness: awake and alert and oriented  Post-procedure vital signs: reviewed and stable  Pain management: adequate  Airway patency: patent    PONV status at discharge: No PONV  Anesthetic complications: no      Cardiovascular status: blood pressure returned to baseline, stable and hemodynamically stable  Respiratory status: unassisted  Hydration status: euvolemic  Follow-up not needed.              Vitals Value Taken Time   /65 12/27/24 1020   Temp 36.6 °C (97.8 °F) 12/27/24 1000   Pulse 70 12/27/24 1020   Resp 17 12/27/24 1020   SpO2 100 % 12/27/24 1020   Vitals shown include unfiled device data.      No case tracking events are documented in the log.      Pain/Stanley Score: Pain Rating Prior to Med Admin: 0 (12/27/2024  6:46 AM)  Stanley Score: 9 (12/27/2024 10:10 AM)

## 2024-12-27 NOTE — PLAN OF CARE
Report given to MARILYNN Hernandez, patient moved to inpatient room 3 for extended recovery. Patient educated on discharge criteria, must void and ambulate a short distance as directed in discharge instructions, patient encouraged to continue drinking her water and to use the call light and let the nurse know when she feels strong enough to ambulate to the restroom, call light in lap.

## 2024-12-27 NOTE — DISCHARGE SUMMARY
[facility]  Discharge Note  Short Stay    Procedure(s) (LRB):  XI ROBOTIC SACROCOLPOPEXY, ABDOMINAL (N/A)  EXCISION, MASS, EXTREMITY (Right)      OUTCOME: Patient tolerated treatment/procedure well without complication and is now ready for discharge.    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     TIME SPENT ON DISCHARGE: 5 minutes

## 2024-12-27 NOTE — DISCHARGE INSTRUCTIONS
The glue on your we will slowly come off the next 2 weeks is okay to shower and your right leg wet please follow all the instructions given you by the gynecologist    Have any redness drainage swelling please let us know the area that we removed was likely something called fat necrosis.  We did send it to the pathologist and it will show up in your my [facility] when the pathologist has completed their evaluation.      Our office phone numbers are  [phone] and     OK to shower day after surgery   Hold off on tub baths for 2 weeks   Use Ibuprofen 200 mg tablets, take 3 to 4 every 8 hours for the first 72 hours .  Use Tylenol as needed and ok to take at same time   Use the prescription pain medication only if the above in no effective with pain control

## 2024-12-30 ENCOUNTER — TELEPHONE (OUTPATIENT)
Dept: OBSTETRICS AND GYNECOLOGY | Facility: CLINIC | Age: 49
End: 2024-12-30
Payer: COMMERCIAL

## 2024-12-30 LAB
FINAL PATHOLOGIC DIAGNOSIS: NORMAL
GROSS: NORMAL
Lab: NORMAL

## 2024-12-30 NOTE — TELEPHONE ENCOUNTER
Contacted pt, verified 2 pt identifiers, informed pt paperwork is complete, provider will sign once in office and paperwork will be faxed, scanned in pt chart, and sent via patient portal. Pt requested rtw date, informed pt rtw date is 2/7/25, 2 weeks after post operative visit 1/24/25. Informed pt she may be able to return sooner based upon post operative visit. Pt brandon.

## 2025-01-02 ENCOUNTER — PATIENT MESSAGE (OUTPATIENT)
Dept: SURGERY | Facility: CLINIC | Age: 50
End: 2025-01-02
Payer: COMMERCIAL

## 2025-01-02 NOTE — TELEPHONE ENCOUNTER
Pathology        Component 6 d ago   Final Pathologic Diagnosis SPECIMEN FROM RIGHT LEG:  DERMAL SCAR WITH FIBROSIS EXTENDING INTO SUBCUTANEOUS TISSUE  NO NEOPLASIA IDENTIFIED

## 2025-01-13 ENCOUNTER — PATIENT MESSAGE (OUTPATIENT)
Dept: INTERNAL MEDICINE | Facility: CLINIC | Age: 50
End: 2025-01-13
Payer: COMMERCIAL

## 2025-01-20 ENCOUNTER — PATIENT MESSAGE (OUTPATIENT)
Dept: INTERNAL MEDICINE | Facility: CLINIC | Age: 50
End: 2025-01-20
Payer: COMMERCIAL

## 2025-01-23 ENCOUNTER — TELEPHONE (OUTPATIENT)
Dept: INTERNAL MEDICINE | Facility: CLINIC | Age: 50
End: 2025-01-23
Payer: COMMERCIAL

## 2025-01-24 ENCOUNTER — OFFICE VISIT (OUTPATIENT)
Dept: OBSTETRICS AND GYNECOLOGY | Facility: CLINIC | Age: 50
End: 2025-01-24
Payer: COMMERCIAL

## 2025-01-24 VITALS
DIASTOLIC BLOOD PRESSURE: 76 MMHG | BODY MASS INDEX: 44.65 KG/M2 | HEIGHT: 63 IN | WEIGHT: 252 LBS | SYSTOLIC BLOOD PRESSURE: 125 MMHG

## 2025-01-24 DIAGNOSIS — Z87.898 NO POST-OP COMPLICATIONS: Primary | ICD-10-CM

## 2025-01-24 PROCEDURE — 1159F MED LIST DOCD IN RCRD: CPT | Mod: CPTII,S$GLB,, | Performed by: OBSTETRICS & GYNECOLOGY

## 2025-01-24 PROCEDURE — 99999 PR PBB SHADOW E&M-EST. PATIENT-LVL III: CPT | Mod: PBBFAC,,, | Performed by: OBSTETRICS & GYNECOLOGY

## 2025-01-24 PROCEDURE — 99024 POSTOP FOLLOW-UP VISIT: CPT | Mod: S$GLB,,, | Performed by: OBSTETRICS & GYNECOLOGY

## 2025-01-24 PROCEDURE — 3078F DIAST BP <80 MM HG: CPT | Mod: CPTII,S$GLB,, | Performed by: OBSTETRICS & GYNECOLOGY

## 2025-01-24 PROCEDURE — 3074F SYST BP LT 130 MM HG: CPT | Mod: CPTII,S$GLB,, | Performed by: OBSTETRICS & GYNECOLOGY

## 2025-01-24 NOTE — PROGRESS NOTES
"  Subjective:       Patient ID: Harrison Estevez is a 49 y.o. female.    Chief Complaint:  Post-op Evaluation      History of Present Illness  HPI  3 weeks post robotic sacral colpopexy   No complaints  No pain   Wounds are healing well   No path to report     Health Maintenance   Topic Date Due    Colorectal Cancer Screening  Never done    Pneumococcal Vaccines (Age 50+) (1 of 1 - PCV) Never done    Influenza Vaccine (1) 2025 (Originally 2024)    TETANUS VACCINE  10/10/2025 (Originally 1991)    Shingles Vaccine (1 of 2) 10/10/2025 (Originally 2023)    COVID-19 Vaccine (3 -  season) 10/10/2025 (Originally 2024)    Mammogram  2025    Hemoglobin A1c (Diabetic Prevention Screening)  10/15/2027    Lipid Panel  10/15/2029    RSV Vaccine (Age 60+ and Pregnant patients) (1 - 1-dose 75+ series) 2048    Hepatitis C Screening  Completed    HIV Screening  Completed     GYN & OB History  Patient's last menstrual period was 2023.   Date of Last Pap: 2023    OB History    Para Term  AB Living   3 3 3     3   SAB IAB Ectopic Multiple Live Births           3      # Outcome Date GA Lbr Indra/2nd Weight Sex Type Anes PTL Lv   3 Term 08    F CS-LTranv   SARAY   2 Term 89    F Vag-Spont   SARAY   1 Term 89    M Vag-Spont   SARAY       Review of Systems  Review of Systems        Objective:   /76   Ht 5' 3" (1.6 m)   Wt 114.3 kg (251 lb 15.8 oz)   LMP 2023   BMI 44.64 kg/m²    Physical Exam     Assessment:        1. No post-op complications                Plan:      May resume normal activity after first week of 2025       Harrison was seen today for post-op evaluation.    Diagnoses and all orders for this visit:    No post-op complications        "

## 2025-01-27 ENCOUNTER — PATIENT MESSAGE (OUTPATIENT)
Dept: OBSTETRICS AND GYNECOLOGY | Facility: CLINIC | Age: 50
End: 2025-01-27
Payer: COMMERCIAL

## 2025-01-27 DIAGNOSIS — Z12.31 SCREENING MAMMOGRAM, ENCOUNTER FOR: Primary | ICD-10-CM

## 2025-01-29 ENCOUNTER — LAB VISIT (OUTPATIENT)
Dept: LAB | Facility: HOSPITAL | Age: 50
End: 2025-01-29
Attending: FAMILY MEDICINE
Payer: COMMERCIAL

## 2025-01-29 ENCOUNTER — TELEPHONE (OUTPATIENT)
Dept: INTERNAL MEDICINE | Facility: CLINIC | Age: 50
End: 2025-01-29
Payer: COMMERCIAL

## 2025-01-29 ENCOUNTER — OFFICE VISIT (OUTPATIENT)
Dept: INTERNAL MEDICINE | Facility: CLINIC | Age: 50
End: 2025-01-29
Payer: COMMERCIAL

## 2025-01-29 VITALS
SYSTOLIC BLOOD PRESSURE: 104 MMHG | TEMPERATURE: 97 F | WEIGHT: 245.25 LBS | BODY MASS INDEX: 43.45 KG/M2 | OXYGEN SATURATION: 99 % | HEIGHT: 63 IN | HEART RATE: 104 BPM | DIASTOLIC BLOOD PRESSURE: 84 MMHG | RESPIRATION RATE: 18 BRPM

## 2025-01-29 DIAGNOSIS — Z00.00 ANNUAL PHYSICAL EXAM: ICD-10-CM

## 2025-01-29 DIAGNOSIS — Z00.00 ANNUAL PHYSICAL EXAM: Primary | ICD-10-CM

## 2025-01-29 DIAGNOSIS — Z12.11 SCREENING FOR COLON CANCER: ICD-10-CM

## 2025-01-29 LAB
CHOLEST SERPL-MCNC: 168 MG/DL (ref 120–199)
CHOLEST/HDLC SERPL: 2.8 {RATIO} (ref 2–5)
ESTIMATED AVG GLUCOSE: 111 MG/DL (ref 68–131)
HBA1C MFR BLD: 5.5 % (ref 4–5.6)
HDLC SERPL-MCNC: 60 MG/DL (ref 40–75)
HDLC SERPL: 35.7 % (ref 20–50)
LDLC SERPL CALC-MCNC: 97 MG/DL (ref 63–159)
NONHDLC SERPL-MCNC: 108 MG/DL
TRIGL SERPL-MCNC: 55 MG/DL (ref 30–150)
TSH SERPL DL<=0.005 MIU/L-ACNC: 0.95 UIU/ML (ref 0.4–4)

## 2025-01-29 PROCEDURE — 83036 HEMOGLOBIN GLYCOSYLATED A1C: CPT | Performed by: FAMILY MEDICINE

## 2025-01-29 PROCEDURE — 80061 LIPID PANEL: CPT | Performed by: FAMILY MEDICINE

## 2025-01-29 PROCEDURE — 3008F BODY MASS INDEX DOCD: CPT | Mod: CPTII,S$GLB,, | Performed by: FAMILY MEDICINE

## 2025-01-29 PROCEDURE — 3044F HG A1C LEVEL LT 7.0%: CPT | Mod: CPTII,S$GLB,, | Performed by: FAMILY MEDICINE

## 2025-01-29 PROCEDURE — 99999 PR PBB SHADOW E&M-EST. PATIENT-LVL IV: CPT | Mod: PBBFAC,,, | Performed by: FAMILY MEDICINE

## 2025-01-29 PROCEDURE — 99396 PREV VISIT EST AGE 40-64: CPT | Mod: S$GLB,,, | Performed by: FAMILY MEDICINE

## 2025-01-29 PROCEDURE — 3079F DIAST BP 80-89 MM HG: CPT | Mod: CPTII,S$GLB,, | Performed by: FAMILY MEDICINE

## 2025-01-29 PROCEDURE — 36415 COLL VENOUS BLD VENIPUNCTURE: CPT | Mod: PN | Performed by: FAMILY MEDICINE

## 2025-01-29 PROCEDURE — 3074F SYST BP LT 130 MM HG: CPT | Mod: CPTII,S$GLB,, | Performed by: FAMILY MEDICINE

## 2025-01-29 PROCEDURE — 84443 ASSAY THYROID STIM HORMONE: CPT | Performed by: FAMILY MEDICINE

## 2025-01-29 PROCEDURE — 1159F MED LIST DOCD IN RCRD: CPT | Mod: CPTII,S$GLB,, | Performed by: FAMILY MEDICINE

## 2025-01-29 NOTE — PROGRESS NOTES
Subjective:       Patient ID: Harrison Estevez is a 49 y.o. female.    Reason for Visit: Annual Exam    Harrison Estevez is a 49 y.o. female who presents to clinic for Annual Exam   She has no acute concerns today, s/p robotic sacral colpopexy with Dr. Shine (2024). Denies any issues post operatively    Obesity- has plans to restart her gym membership, exercises with her daughter on Saturday         Review of Systems   Constitutional:  Negative for chills and fever.   HENT:  Negative for congestion, rhinorrhea and sore throat.    Respiratory:  Negative for cough, shortness of breath and wheezing.    Cardiovascular:  Negative for chest pain, palpitations and leg swelling.   Gastrointestinal:  Negative for abdominal pain, constipation, diarrhea, nausea and vomiting.   Genitourinary:  Negative for dysuria, frequency and urgency.   Neurological:  Negative for headaches.   Psychiatric/Behavioral:  Negative for dysphoric mood.          Current Outpatient Medications on File Prior to Visit   Medication Sig Dispense Refill    docusate sodium (COLACE) 100 MG capsule Take 1 capsule (100 mg total) by mouth 2 (two) times daily. 90 capsule 3    ferrous sulfate (FEOSOL) 325 mg (65 mg iron) Tab tablet Take 1 tablet (325 mg total) by mouth once daily. 90 tablet 3    gabapentin (NEURONTIN) 600 MG tablet TAKE 1 TABLET BY MOUTH DAILY 30 tablet 2    naproxen (NAPROSYN) 500 MG tablet Take 500 mg by mouth 2 (two) times daily.      estradioL (ESTRACE) 0.01 % (0.1 mg/gram) vaginal cream Place 1 g vaginally 3 (three) times a week. (Patient not taking: Reported on 2025) 42.5 g 12     No current facility-administered medications on file prior to visit.        Past Medical History:   Diagnosis Date    Abnormal Pap smear 2011    Cryosurgery done    General anesthetics causing adverse effect in therapeutic use     slow to wake up following     Microcytic anemia 10/27/2024        Past Surgical History:   Procedure  Laterality Date    ANKLE SURGERY      APPENDECTOMY      EXCISION OF MASS OF EXTREMITY Right 12/27/2024    Procedure: EXCISION, MASS, EXTREMITY;  Surgeon: Willy Castellanos MD;  Location: [facility] OR;  Service: General;  Laterality: Right;    GYNECOLOGIC CRYOSURGERY  01/01/2011    HYSTERECTOMY      INSERTION OF INTRAMEDULLARY NAIL INTO TIBIA Right 01/04/2024    Procedure: INSERTION, INTRAMEDULLARY JANIS, TIBIA;  Surgeon: Brenda Henson DO;  Location: [facility] OR;  Service: Orthopedics;  Laterality: Right;    OOPHORECTOMY      ROBOT-ASSISTED LAPAROSCOPIC ABDOMINAL HYSTERECTOMY USING DA NICKY XI N/A 06/30/2023    Procedure: XI ROBOTIC HYSTERECTOMY;  Surgeon: BHUMI Shine MD;  Location: [facility] OR;  Service: OB/GYN;  Laterality: N/A;    ROBOT-ASSISTED LAPAROSCOPIC ABDOMINAL SACROCOLPOPEXY USING DA NICKY XI N/A 12/27/2024    Procedure: XI ROBOTIC SACROCOLPOPEXY, ABDOMINAL;  Surgeon: BHUMI Shine MD;  Location: [facility] OR;  Service: OB/GYN;  Laterality: N/A;      Objective:      Physical Exam  Vitals reviewed.   Constitutional:       Appearance: She is obese.   HENT:      Head: Normocephalic and atraumatic.      Nose: No congestion or rhinorrhea.   Eyes:      General: No scleral icterus.        Right eye: No discharge.         Left eye: No discharge.      Extraocular Movements: Extraocular movements intact.      Conjunctiva/sclera: Conjunctivae normal.      Pupils: Pupils are equal, round, and reactive to light.   Cardiovascular:      Rate and Rhythm: Normal rate and regular rhythm.      Heart sounds: No murmur heard.     No friction rub. No gallop.   Pulmonary:      Effort: Pulmonary effort is normal. No respiratory distress.      Breath sounds: Normal breath sounds. No stridor. No wheezing or rhonchi.   Abdominal:      General: Bowel sounds are normal.      Palpations: Abdomen is soft.   Musculoskeletal:      Right lower leg: No edema.      Left lower leg: No edema.   Skin:     General: Skin is warm.    Neurological:      General: No focal deficit present.      Mental Status: She is alert.   Psychiatric:         Mood and Affect: Mood normal.         Behavior: Behavior normal.         Assessment:       1. Annual physical exam    2. Screening for colon cancer        Plan:   1. Annual physical exam  49 year old female presents to clinic for annual exam, advised annual vision screening and routine dental cleaning, declined vaccines today   -     Hemoglobin A1C; Future; Expected date: 01/29/2025  -     Lipid Panel; Future; Expected date: 01/29/2025  -     TSH; Future; Expected date: 01/29/2025    2. Screening for colon cancer  -     Cologuard Screening (Multitarget Stool DNA); Future; Expected date: 01/29/2025         Future Appointments   Date Time Provider Department Center   2/11/2025  9:20 AM Mg Fiore PA-C ON ORTHO [facility]   2/21/2025  2:00 PM ONL XR1-DR ON XRAY [facility]   5/16/2025  9:00 AM ON[facility] MAMMO1 ON MAMMO [facility]        Kym Sparrow MD  [facility] [address]  [place] [address]  [phone]

## 2025-03-02 ENCOUNTER — RESULTS FOLLOW-UP (OUTPATIENT)
Dept: INTERNAL MEDICINE | Facility: CLINIC | Age: 50
End: 2025-03-02

## 2025-03-02 LAB — NONINV COLON CA DNA+OCC BLD SCRN STL QL: NEGATIVE

## 2025-04-09 NOTE — H&P (VIEW-ONLY)
"Caller: Dominique Leon \"MARSHAL\"    Subjective     Patient ID: Harrison Estevez is a 49 y.o. female.    Chief Complaint:  Pre-op Exam      History of Present Illness  HPI  Post hysterectomy with pelvic symptoms of urge and no refugio .  Vaginal cuff prolapse noted on exam for surgical repair     GYN & OB History  Patient's last menstrual period was 2023.   Date of Last Pap: 2023    OB History    Para Term  AB Living   3 3 3     3   SAB IAB Ectopic Multiple Live Births           3      # Outcome Date GA Lbr Indra/2nd Weight Sex Type Anes PTL Lv   3 Term 08    F CS-LTranv   SARAY   2 Term 89    F Vag-Spont   SARAY   1 Term 89    M Vag-Spont   SARAY     Past Medical History:   Diagnosis Date    Abnormal Pap smear 2011    Cryosurgery done    General anesthetics causing adverse effect in therapeutic use     slow to wake up following     Microcytic anemia 10/27/2024     Past Surgical History:   Procedure Laterality Date    ANKLE SURGERY      APPENDECTOMY      GYNECOLOGIC CRYOSURGERY  2011    HYSTERECTOMY      INSERTION OF INTRAMEDULLARY NAIL INTO TIBIA Right 2024    Procedure: INSERTION, INTRAMEDULLARY JANIS, TIBIA;  Surgeon: Brenda Henson DO;  Location: [facility] OR;  Service: Orthopedics;  Laterality: Right;    OOPHORECTOMY      ROBOT-ASSISTED LAPAROSCOPIC ABDOMINAL HYSTERECTOMY USING DA NICKY XI N/A 2023    Procedure: XI ROBOTIC HYSTERECTOMY;  Surgeon: BHUMI Shine MD;  Location: [facility] OR;  Service: OB/GYN;  Laterality: N/A;     Family History   Problem Relation Name Age of Onset    Breast cancer Maternal Grandmother      Breast cancer Maternal Aunt       Social History     Tobacco Use    Smoking status: Never    Smokeless tobacco: Never   Substance Use Topics    Alcohol use: Yes     Comment: occassional    Drug use: No     (Not in a hospital admission)    Review of patient's allergies indicates:   Allergen Reactions    Penicillanic sulfone bl beta-lactamase inhibitors Hives     Penicillins      Current Outpatient Medications   Medication Sig    docusate sodium (COLACE) 100 MG capsule Take 1 capsule (100 mg total) by mouth 2 (two) times daily.    estradioL (ESTRACE) 0.01 % (0.1 mg/gram) vaginal cream Place 1 g vaginally 3 (three) times a week.    ferrous sulfate (FEOSOL) 325 mg (65 mg iron) Tab tablet Take 1 tablet (325 mg total) by mouth once daily.    gabapentin (NEURONTIN) 600 MG tablet TAKE 1 TABLET BY MOUTH DAILY    naproxen (NAPROSYN) 500 MG tablet TAKE 1 TABLET BY MOUTH TWICE DAILY (Patient taking differently: Take 500 mg by mouth 2 (two) times daily. As needed)     No current facility-administered medications for this visit.       Review of Systems  Review of Systems   Constitutional:  Negative for activity change, appetite change, chills, fatigue, fever and unexpected weight change.   HENT:  Negative for mouth sores.    Eyes:  Negative for visual disturbance.   Respiratory:  Negative for cough and shortness of breath.    Cardiovascular:  Negative for chest pain and palpitations.   Gastrointestinal:  Negative for abdominal pain, bloating, blood in stool, constipation, diarrhea and nausea.   Genitourinary:  Positive for urgency. Negative for decreased libido, dyspareunia, dysuria, frequency, genital sores, hematuria, pelvic pain, vaginal discharge, urinary incontinence, postcoital bleeding, postmenopausal bleeding and vaginal odor.   Musculoskeletal:  Negative for back pain, joint swelling and myalgias.   Integumentary:  Negative for rash, breast mass, nipple discharge and breast skin changes.   Neurological:  Negative for syncope, numbness and headaches.   Hematological:  Negative for adenopathy. Does not bruise/bleed easily.   Psychiatric/Behavioral:  Negative for depression and sleep disturbance. The patient is not nervous/anxious.    Breast: Negative for mass, mastodynia, nipple discharge and skin changes         Objective   Physical Exam:   Constitutional: She is oriented to  person, place, and time. Vital signs are normal. She appears well-developed and well-nourished. No distress.    HENT:   Head: Normocephalic and atraumatic.   Right Ear: External ear normal.   Left Ear: External ear normal.   Nose: Nose normal.    Eyes: Pupils are equal, round, and reactive to light. Conjunctivae are normal.    Neck: Trachea normal. No JVD present. No thyroid mass and no thyromegaly present.    Cardiovascular:  Normal rate and regular rhythm.             Pulmonary/Chest: Effort normal and breath sounds normal. No respiratory distress.        Abdominal: Soft. Bowel sounds are normal. She exhibits no distension and no mass. There is no abdominal tenderness. No hernia. Hernia confirmed negative in the ventral area, confirmed negative in the right inguinal area and confirmed negative in the left inguinal area.     Genitourinary:    Vagina and rectum normal.   Rectum:      No external hemorrhoid or abnormal anal tone.     Labial bartholins normal.There is no rash, tenderness or lesion on the right labia. There is no rash, tenderness or lesion on the left labia. Right adnexum displays no mass, no tenderness and no fullness. Left adnexum displays no mass, no tenderness and no fullness. There is unspecified prolapse of vaginal walls in the vagina. No vaginal discharge, tenderness, bleeding, rectocele or cystocele in the vagina.    No foreign body in the vagina.   Cervix is absent.Uterus is absent.           Musculoskeletal: Normal range of motion.       Neurological: She is alert and oriented to person, place, and time. She has normal reflexes.    Skin: Skin is warm and dry. She is not diaphoretic.    Psychiatric: She has a normal mood and affect. Her speech is normal and behavior is normal. Thought content normal.            Assessment and Plan     1. 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    Diagnoses and all orders for this visit:    Prolapse  of vaginal cuff after hysterectomy  Comments:  Robotic sacral colpopexy

## 2025-05-13 ENCOUNTER — TELEPHONE (OUTPATIENT)
Dept: PODIATRY | Facility: CLINIC | Age: 50
End: 2025-05-13
Payer: COMMERCIAL

## 2025-05-22 ENCOUNTER — HOSPITAL ENCOUNTER (OUTPATIENT)
Dept: RADIOLOGY | Facility: HOSPITAL | Age: 50
Discharge: HOME | End: 2025-05-22
Attending: PODIATRIST
Payer: COMMERCIAL

## 2025-05-22 ENCOUNTER — OFFICE VISIT (OUTPATIENT)
Dept: PODIATRY | Facility: CLINIC | Age: 50
End: 2025-05-22
Payer: COMMERCIAL

## 2025-05-22 VITALS — HEIGHT: 63 IN | BODY MASS INDEX: 43.48 KG/M2 | WEIGHT: 245.38 LBS

## 2025-05-22 DIAGNOSIS — M79.672 LEFT FOOT PAIN: Primary | ICD-10-CM

## 2025-05-22 DIAGNOSIS — M79.672 LEFT FOOT PAIN: ICD-10-CM

## 2025-05-22 PROCEDURE — 3008F BODY MASS INDEX DOCD: CPT | Mod: CPTII,S$GLB,, | Performed by: PODIATRIST

## 2025-05-22 PROCEDURE — 99999 PR PBB SHADOW E&M-EST. PATIENT-LVL III: CPT | Mod: PBBFAC,,, | Performed by: PODIATRIST

## 2025-05-22 PROCEDURE — 73630 X-RAY EXAM OF FOOT: CPT | Mod: 26,LT,, | Performed by: RADIOLOGY

## 2025-05-22 PROCEDURE — 73630 X-RAY EXAM OF FOOT: CPT | Mod: TC,LT

## 2025-05-22 PROCEDURE — 1159F MED LIST DOCD IN RCRD: CPT | Mod: CPTII,S$GLB,, | Performed by: PODIATRIST

## 2025-05-22 PROCEDURE — 3044F HG A1C LEVEL LT 7.0%: CPT | Mod: CPTII,S$GLB,, | Performed by: PODIATRIST

## 2025-05-22 PROCEDURE — 99213 OFFICE O/P EST LOW 20 MIN: CPT | Mod: S$GLB,,, | Performed by: PODIATRIST

## 2025-05-22 PROCEDURE — 1160F RVW MEDS BY RX/DR IN RCRD: CPT | Mod: CPTII,S$GLB,, | Performed by: PODIATRIST

## 2025-05-22 RX ORDER — NAPROXEN 500 MG/1
500 TABLET ORAL 2 TIMES DAILY
Qty: 20 TABLET | Refills: 0 | Status: SHIPPED | OUTPATIENT
Start: 2025-05-22 | End: 2025-06-01

## 2025-05-22 NOTE — PROGRESS NOTES
Subjective:       Patient ID: Harrison Estevez is a 49 y.o. female.    Chief Complaint: Foot Pain (C/o pain in the left 2nd toe and the lateral side of the foot, pt states the pain started 3 months ago and its getting worse, pt 2nd toe is dark and discolored, pt rates pain 7/10, pt is non-diabetic)    HPI: Harrison Estevez presents to the office today with complaints of moderate pains at the lateral aspect of the left foot.  States pain has been increasing over the last 2 months.  She denies recent trauma or injury.  Denies any situation of falling.  She is concerned that this may be associated with compensation to the right lower leg with ambulation.  Pain with activity and walking.  Patient rates the pain at approximately 7/10.  States severely antalgic gait pattern. Kym Sparrow MD is the primary care provider.  Prolonged walking and standing does exacerbate the symptoms.    Review of patient's allergies indicates:   Allergen Reactions    Penicillanic sulfone bl beta-lactamase inhibitors Hives    Penicillins        Past Medical History:   Diagnosis Date    Abnormal Pap smear 2011    Cryosurgery done    General anesthetics causing adverse effect in therapeutic use     slow to wake up following     Microcytic anemia 10/27/2024       Family History   Problem Relation Name Age of Onset    Hypertension Mother      Diabetes Mother      Cancer Father          Lung cancer    Hypertension Father      Breast cancer Maternal Aunt      Breast cancer Maternal Grandmother         Social History[1]    Past Surgical History:   Procedure Laterality Date    ANKLE SURGERY      APPENDECTOMY      EXCISION OF MASS OF EXTREMITY Right 2024    Procedure: EXCISION, MASS, EXTREMITY;  Surgeon: Willy Castellanos MD;  Location: [facility];  Service: General;  Laterality: Right;    GYNECOLOGIC CRYOSURGERY  2011    HYSTERECTOMY      INSERTION OF INTRAMEDULLARY NAIL INTO TIBIA Right 2024    Procedure:  "INSERTION, INTRAMEDULLARY JANIS, TIBIA;  Surgeon: Brenda Henson DO;  Location: [facility] OR;  Service: Orthopedics;  Laterality: Right;    OOPHORECTOMY      ROBOT-ASSISTED LAPAROSCOPIC ABDOMINAL HYSTERECTOMY USING DA NICKY XI N/A 06/30/2023    Procedure: XI ROBOTIC HYSTERECTOMY;  Surgeon: BHUMI Shine MD;  Location: [facility] OR;  Service: OB/GYN;  Laterality: N/A;    ROBOT-ASSISTED LAPAROSCOPIC ABDOMINAL SACROCOLPOPEXY USING DA NICKY XI N/A 12/27/2024    Procedure: XI ROBOTIC SACROCOLPOPEXY, ABDOMINAL;  Surgeon: BHUMI Shine MD;  Location: [facility] OR;  Service: OB/GYN;  Laterality: N/A;       Review of Systems       Objective:   Ht 5' 3" (1.6 m)   Wt 111.3 kg (245 lb 6 oz)   LMP 04/12/2023   BMI 43.47 kg/m²     X-Ray Foot Complete Left  Narrative: EXAMINATION:  XR FOOT COMPLETE 3 VIEW LEFT    CLINICAL HISTORY:  .  Pain in left foot    TECHNIQUE:  AP, lateral and oblique views of the left foot were performed.    COMPARISON:  None    FINDINGS:  No acute abnormality.  Hallux valgus with 1st MTP joint degenerative findings noted.  No focal soft tissue abnormality.  Impression: As above    Electronically signed by: Nima Urias MD  Date:    05/22/2025  Time:    12:29       Physical Exam    LOWER EXTREMITY PHYSICAL EXAMINATION  ORTHOPEDIC:  Moderate to severe pain on palpation 5th metatarsal base of the insertion of the peroneal brevis tendon styloid process.  No significant pain distally to the 5th metatarsophalangeal joint.  Pain decreases from proximal to distal.  There is also no significant pain to the retro malleolar peroneal tendon proximally.  Pain isolated to the styloid process.      DERMATOLOGY: Skin is supple, dry and intact. No ecchymosis is noted. No hypertrophic skin formation. No erythema or cellulitis is noted.    NEUROLOGY: Sensation to light touch is intact. Proprioception is intact. Sensation to pin prick is intact. Deep tendon reflexes of the lower extremity are WNL.    VASCULAR:  The " right dorsalis pedis pulse 2/4 and the right posterior tibial pulse 2/4.  The left dorsalis pedis pulse 2/4 and posterior tibial pulse on the left is 2/4.  Capillary refill is intact.  Pedal hair growth intact      Assessment:     1. Left foot pain        Plan:     Left foot pain  -     X-Ray Foot Complete Left; Future; Expected date: 05/22/2025    Other orders  -     naproxen (NAPROSYN) 500 MG tablet; Take 1 tablet (500 mg total) by mouth 2 (two) times daily. for 10 days  Dispense: 20 tablet; Refill: 0      Thorough discussion is had with the patient today, concerning the diagnosis, its etiology, and the treatment algorithm at present.    Patient does have significant pain on palpation of the styloid process of the left foot.  Given her history of fractures in difficulties ambulating, I am concerned about a potential peroneal insertional tendinitis versus avulsion fracture.  Recommend x-rays.    X-rays reviewed and interpreted clinically, there is no signs of fracture dislocation    Discuss peroneal tendinitis.  Recommend appropriate shoes that prevent increased tendon pull and pressure to the lateral foot.  Will also have patient utilize anti-inflammatory medication.  If having no significant improvement with this treatment plan, would recommend the use of a Cam boot to allow for the tendon to calm down to decrease pain        Future Appointments   Date Time Provider Department Center   5/22/2025  3:30 PM [facility] XR2-CR [facility] XRAY [facility]   5/29/2025  2:20 PM ONLH MAMMO1 ON[facility] MAMMO [facility]             [1]   Social History  Socioeconomic History    Marital status:    Tobacco Use    Smoking status: Never    Smokeless tobacco: Never   Substance and Sexual Activity    Alcohol use: Yes     Comment: occassional    Drug use: No    Sexual activity: Yes     Partners: Male   Social History Narrative        Worked at [employer] for over 20 yearss    Recently had accidenct    Hystrectomy in June      Social Drivers of  Health     Financial Resource Strain: Low Risk  (10/10/2024)    Overall Financial Resource Strain (CARDIA)     Difficulty of Paying Living Expenses: Not hard at all   Food Insecurity: No Food Insecurity (10/10/2024)    Hunger Vital Sign     Worried About Running Out of Food in the Last Year: Never true     Ran Out of Food in the Last Year: Never true   Physical Activity: Insufficiently Active (10/10/2024)    Exercise Vital Sign     Days of Exercise per Week: 2 days     Minutes of Exercise per Session: 30 min   Stress: Stress Concern Present (10/10/2024)    [place] [facility] [employer] - Occupational Stress Questionnaire     Feeling of Stress : To some extent   Housing Stability: High Risk (10/10/2024)    Housing Stability Vital Sign     Unable to Pay for Housing in the Last Year: Yes

## 2025-07-01 ENCOUNTER — HOSPITAL ENCOUNTER (OUTPATIENT)
Dept: RADIOLOGY | Facility: HOSPITAL | Age: 50
Discharge: HOME | End: 2025-07-01
Attending: OBSTETRICS & GYNECOLOGY
Payer: COMMERCIAL

## 2025-07-01 VITALS — WEIGHT: 245.38 LBS | BODY MASS INDEX: 43.48 KG/M2 | HEIGHT: 63 IN

## 2025-07-01 DIAGNOSIS — Z12.31 SCREENING MAMMOGRAM, ENCOUNTER FOR: ICD-10-CM

## 2025-07-01 PROCEDURE — 77067 SCR MAMMO BI INCL CAD: CPT | Mod: TC

## 2025-07-01 PROCEDURE — 77063 BREAST TOMOSYNTHESIS BI: CPT | Mod: 26,,, | Performed by: RADIOLOGY

## 2025-07-01 PROCEDURE — 77067 SCR MAMMO BI INCL CAD: CPT | Mod: 26,,, | Performed by: RADIOLOGY

## 2025-07-02 ENCOUNTER — RESULTS FOLLOW-UP (OUTPATIENT)
Dept: OBSTETRICS AND GYNECOLOGY | Facility: CLINIC | Age: 50
End: 2025-07-02

## (undated) DEVICE — TUBING MEDI-VAC 20FT .25IN

## (undated) DEVICE — SEE L#159833

## (undated) DEVICE — DRAPE UINDERBUT GRAD PCH

## (undated) DEVICE — MANIFOLD 4 PORT

## (undated) DEVICE — KIT TURNOVER

## (undated) DEVICE — PACK BASIC SETUP SC BR

## (undated) DEVICE — GOWN POLY REINF BRTH SLV XL

## (undated) DEVICE — TUBING HEATED INSUFFLATOR

## (undated) DEVICE — SPONGE COTTON TRAY 4X4IN

## (undated) DEVICE — ELECTRODE REM PLYHSV RETURN 9

## (undated) DEVICE — OBTURATOR BLADELESS 8MM XI CLR

## (undated) DEVICE — DRAPE MOBILE C-ARM

## (undated) DEVICE — SUT GORETEX CV-2 TH-26

## (undated) DEVICE — SUT VICRYL PLUS 0 CT1 36IN

## (undated) DEVICE — NDL SAFETY 25G X 1.5 ECLIPSE

## (undated) DEVICE — SOL ELECTROLUBE ANTI-STIC

## (undated) DEVICE — SEAL UNIVERSAL 5MM-8MM XI

## (undated) DEVICE — APPLICATOR CHLORAPREP ORN 26ML

## (undated) DEVICE — TIP RUMI GREEN DISPOSABLE6.7MM

## (undated) DEVICE — DRESSING TELFA N ADH 3X8

## (undated) DEVICE — TAPE SILK 3IN

## (undated) DEVICE — SUT MONOCRYL PLUS UD 3-0 27

## (undated) DEVICE — SOL NACL IRR 1000ML BTL

## (undated) DEVICE — DRAPE COLUMN DAVINCI XI

## (undated) DEVICE — SYR 10CC LUER LOCK

## (undated) DEVICE — SUT VICRYL 2-0 CT-2 VCP269H

## (undated) DEVICE — COVER TIP CURVED SCISSORS XI

## (undated) DEVICE — SOL NACL 0.9% IV INJ 1000ML

## (undated) DEVICE — DRAPE LAVH SURG 109X109X100IN

## (undated) DEVICE — BANDAGE MATRIX HK LOOP 4IN 5YD

## (undated) DEVICE — SUT VICRYL 3-0 27 SH

## (undated) DEVICE — TROCAR ENDOPATH XCEL 5X100MM

## (undated) DEVICE — GLOVE SURGICAL LATEX SZ 7

## (undated) DEVICE — TAPE SURGICAL MICROFOAM 3IN

## (undated) DEVICE — SYR IRRIGATION BULB STER 60ML

## (undated) DEVICE — SUT V-LOC 90 GS22 2-0 VIO 23CM

## (undated) DEVICE — SUPPORT ULNA NERVE PROTECTOR

## (undated) DEVICE — SYR 3CC LUER LOC

## (undated) DEVICE — BLADE SURG #15 CARBON STEEL

## (undated) DEVICE — SUT MONOCRYL 4-0 PS-1 UND

## (undated) DEVICE — TOWEL OR DISP STRL BLUE 4/PK

## (undated) DEVICE — ADHESIVE DERMABOND ADVANCED

## (undated) DEVICE — SET PNEUMOCLEAR HEAT HUM SE HF

## (undated) DEVICE — REAMER SHAFT MOD TRINKLE 8X510

## (undated) DEVICE — OCCLUDER COLPO-PNEUMO STERILE

## (undated) DEVICE — COVER OVERHEAD SURG LT BLUE

## (undated) DEVICE — GUIDE WIRE, BALL-TIPPED, STERILE
Type: IMPLANTABLE DEVICE | Site: TIBIA | Status: NON-FUNCTIONAL
Removed: 2024-01-04

## (undated) DEVICE — IRRIGATOR ENDOSCOPY DISP.

## (undated) DEVICE — COVER LIGHT HANDLE 80/CA

## (undated) DEVICE — ADHESIVE MASTISOL VIAL 48/BX

## (undated) DEVICE — DRAPE ORTH SPLIT 77X108IN

## (undated) DEVICE — PAD CAST SPECIALIST STRL 6

## (undated) DEVICE — DRAPE INCISE IOBAN 2 23X33IN

## (undated) DEVICE — K-WIRE
Type: IMPLANTABLE DEVICE | Site: TIBIA | Status: NON-FUNCTIONAL
Removed: 2024-01-04

## (undated) DEVICE — UNDERGLOVES BIOGEL PI SZ 7 LF

## (undated) DEVICE — DRILL T2 ALPHA LOK 4.2X360MM

## (undated) DEVICE — TRAY SKIN SCRUB WET PREMIUM

## (undated) DEVICE — GLOVE SURG BIOGEL LATEX SZ 7.5

## (undated) DEVICE — Device

## (undated) DEVICE — PAD CAST SPECIALIST STRL 4

## (undated) DEVICE — YANKAUER FLEX NO VENT REG CAP

## (undated) DEVICE — POSITIONER HEAD DONUT 9IN FOAM

## (undated) DEVICE — EVACUATOR KIT SMOKE PLUME AWAY

## (undated) DEVICE — SOL NORMAL USPCA 0.9%

## (undated) DEVICE — TRAY CATH 1-LYR URIMTR 16FR

## (undated) DEVICE — COVER PROXIMA MAYO STAND

## (undated) DEVICE — DRAPE VERTICAL ISOLATION

## (undated) DEVICE — NDL PNEUMO INSUFFLATI 120MM

## (undated) DEVICE — DRAPE LAP T SHT W/ INSTR PAD

## (undated) DEVICE — UNDERGLOVE BIOGEL PI SZ 6.5 LF

## (undated) DEVICE — WALKER TRACKER EX LARGE

## (undated) DEVICE — SUT ETHICON 3-0 BLK MONO PS

## (undated) DEVICE — DRAPE ARM DAVINCI XI

## (undated) DEVICE — SYR 50CC LL

## (undated) DEVICE — KIT ANTIFOG

## (undated) DEVICE — GLOVE BIOGEL PI MICRO SZ 6.5

## (undated) DEVICE — SUT MONOCRYL 4.0 PS2 CP496G

## (undated) DEVICE — DRAPE STERI LONG

## (undated) DEVICE — DRAPE U SPLIT SHEET 54X76IN

## (undated) DEVICE — GLOVE SURGICAL LATEX SZ 8

## (undated) DEVICE — IRRIGATOR ENDOWRIST XI SUCTION

## (undated) DEVICE — TRAY CATH FOL SIL URIMTR 16FR

## (undated) DEVICE — DRAPE C-ARMOR EQUIPMENT COVER

## (undated) DEVICE — DRAPE THREE-QTR REINF 53X77IN

## (undated) DEVICE — GLOVE SURGEONS ULTRA TOUCH 6.5

## (undated) DEVICE — SUT V-LOC 180 ABD 2/0 GS-21

## (undated) DEVICE — SUT 2/0 30IN SILK BLK BRAI

## (undated) DEVICE — ALCOHOL ISOPROPYL 4OZ

## (undated) DEVICE — GLOVE BIOGEL PI MICRO SZ 6

## (undated) DEVICE — COVER CAMERA OPERATING ROOM

## (undated) DEVICE — KIT ANTIFOG W/SPONG & FLUID